# Patient Record
Sex: FEMALE | Race: WHITE | NOT HISPANIC OR LATINO | Employment: OTHER | URBAN - METROPOLITAN AREA
[De-identification: names, ages, dates, MRNs, and addresses within clinical notes are randomized per-mention and may not be internally consistent; named-entity substitution may affect disease eponyms.]

---

## 2017-07-20 ENCOUNTER — OFFICE VISIT - HEALTHEAST (OUTPATIENT)
Dept: FAMILY MEDICINE | Facility: CLINIC | Age: 67
End: 2017-07-20

## 2017-07-20 DIAGNOSIS — R39.15 URGENCY OF URINATION: ICD-10-CM

## 2017-07-20 DIAGNOSIS — R30.0 DYSURIA: ICD-10-CM

## 2017-09-13 ENCOUNTER — COMMUNICATION - HEALTHEAST (OUTPATIENT)
Dept: FAMILY MEDICINE | Facility: CLINIC | Age: 67
End: 2017-09-13

## 2017-09-13 DIAGNOSIS — K21.9 GERD (GASTROESOPHAGEAL REFLUX DISEASE): ICD-10-CM

## 2017-10-10 ENCOUNTER — OFFICE VISIT - HEALTHEAST (OUTPATIENT)
Dept: FAMILY MEDICINE | Facility: CLINIC | Age: 67
End: 2017-10-10

## 2017-10-10 DIAGNOSIS — F41.9 ANXIETY: ICD-10-CM

## 2017-10-10 DIAGNOSIS — L65.9 HAIR LOSS: ICD-10-CM

## 2017-10-10 DIAGNOSIS — K21.9 GERD (GASTROESOPHAGEAL REFLUX DISEASE): ICD-10-CM

## 2017-10-10 DIAGNOSIS — L29.9 ITCHING: ICD-10-CM

## 2017-10-10 DIAGNOSIS — E78.5 HYPERLIPIDEMIA: ICD-10-CM

## 2017-10-10 ASSESSMENT — MIFFLIN-ST. JEOR: SCORE: 1268.39

## 2017-10-11 ENCOUNTER — AMBULATORY - HEALTHEAST (OUTPATIENT)
Dept: LAB | Facility: CLINIC | Age: 67
End: 2017-10-11

## 2017-10-11 ENCOUNTER — COMMUNICATION - HEALTHEAST (OUTPATIENT)
Dept: FAMILY MEDICINE | Facility: CLINIC | Age: 67
End: 2017-10-11

## 2017-10-11 DIAGNOSIS — L65.9 HAIR LOSS: ICD-10-CM

## 2017-10-11 DIAGNOSIS — K21.9 GERD (GASTROESOPHAGEAL REFLUX DISEASE): ICD-10-CM

## 2017-10-11 DIAGNOSIS — E78.5 HYPERLIPIDEMIA: ICD-10-CM

## 2017-10-11 LAB
CHOLEST SERPL-MCNC: 179 MG/DL
FASTING STATUS PATIENT QL REPORTED: YES
HDLC SERPL-MCNC: 56 MG/DL
LDLC SERPL CALC-MCNC: 87 MG/DL
TRIGL SERPL-MCNC: 180 MG/DL

## 2017-10-24 ENCOUNTER — HOSPITAL ENCOUNTER (OUTPATIENT)
Dept: MAMMOGRAPHY | Facility: HOSPITAL | Age: 67
Discharge: HOME | End: 2017-10-24
Attending: FAMILY MEDICINE

## 2017-10-24 DIAGNOSIS — Z12.31 VISIT FOR SCREENING MAMMOGRAM: ICD-10-CM

## 2019-01-21 ENCOUNTER — OFFICE VISIT - HEALTHEAST (OUTPATIENT)
Dept: FAMILY MEDICINE | Facility: CLINIC | Age: 69
End: 2019-01-21

## 2019-01-21 ENCOUNTER — RECORDS - HEALTHEAST (OUTPATIENT)
Dept: GENERAL RADIOLOGY | Facility: CLINIC | Age: 69
End: 2019-01-21

## 2019-01-21 DIAGNOSIS — K21.9 GERD (GASTROESOPHAGEAL REFLUX DISEASE): ICD-10-CM

## 2019-01-21 DIAGNOSIS — E55.9 VITAMIN D DEFICIENCY: ICD-10-CM

## 2019-01-21 DIAGNOSIS — M89.9 DISORDER OF BONE AND CARTILAGE: ICD-10-CM

## 2019-01-21 DIAGNOSIS — F17.210 CIGARETTE NICOTINE DEPENDENCE WITHOUT COMPLICATION: ICD-10-CM

## 2019-01-21 DIAGNOSIS — F41.1 ANXIETY STATE: ICD-10-CM

## 2019-01-21 DIAGNOSIS — R93.89 ABNORMAL FINDINGS ON DIAGNOSTIC IMAGING OF OTHER SPECIFIED BODY STRUCTURES: ICD-10-CM

## 2019-01-21 DIAGNOSIS — M94.9 DISORDER OF BONE AND CARTILAGE: ICD-10-CM

## 2019-01-21 DIAGNOSIS — M25.562 PAIN IN LEFT KNEE: ICD-10-CM

## 2019-01-21 DIAGNOSIS — M25.562 LEFT KNEE PAIN, UNSPECIFIED CHRONICITY: ICD-10-CM

## 2019-01-21 DIAGNOSIS — K21.9 GASTROESOPHAGEAL REFLUX DISEASE WITHOUT ESOPHAGITIS: ICD-10-CM

## 2019-01-21 DIAGNOSIS — F41.9 ANXIETY: ICD-10-CM

## 2019-01-21 DIAGNOSIS — E78.5 HYPERLIPIDEMIA, UNSPECIFIED HYPERLIPIDEMIA TYPE: ICD-10-CM

## 2019-01-21 LAB
ALBUMIN SERPL-MCNC: 3.8 G/DL (ref 3.5–5)
ALP SERPL-CCNC: 85 U/L (ref 45–120)
ALT SERPL W P-5'-P-CCNC: 23 U/L (ref 0–45)
ANION GAP SERPL CALCULATED.3IONS-SCNC: 9 MMOL/L (ref 5–18)
AST SERPL W P-5'-P-CCNC: 17 U/L (ref 0–40)
BILIRUB DIRECT SERPL-MCNC: 0.2 MG/DL
BILIRUB SERPL-MCNC: 0.6 MG/DL (ref 0–1)
BUN SERPL-MCNC: 18 MG/DL (ref 8–28)
CALCIUM SERPL-MCNC: 9.6 MG/DL (ref 8.5–10.5)
CHLORIDE BLD-SCNC: 108 MMOL/L (ref 98–107)
CHOLEST SERPL-MCNC: 188 MG/DL
CO2 SERPL-SCNC: 25 MMOL/L (ref 22–31)
CREAT SERPL-MCNC: 0.7 MG/DL (ref 0.6–1.1)
FASTING STATUS PATIENT QL REPORTED: YES
GFR SERPL CREATININE-BSD FRML MDRD: >60 ML/MIN/1.73M2
GLUCOSE BLD-MCNC: 94 MG/DL (ref 70–125)
HDLC SERPL-MCNC: 78 MG/DL
LDLC SERPL CALC-MCNC: 90 MG/DL
POTASSIUM BLD-SCNC: 4.7 MMOL/L (ref 3.5–5)
PROT SERPL-MCNC: 7 G/DL (ref 6–8)
SODIUM SERPL-SCNC: 142 MMOL/L (ref 136–145)
TRIGL SERPL-MCNC: 98 MG/DL
TSH SERPL DL<=0.005 MIU/L-ACNC: 1.28 UIU/ML (ref 0.3–5)

## 2019-01-22 ENCOUNTER — COMMUNICATION - HEALTHEAST (OUTPATIENT)
Dept: FAMILY MEDICINE | Facility: CLINIC | Age: 69
End: 2019-01-22

## 2019-01-22 LAB — 25(OH)D3 SERPL-MCNC: 13.6 NG/ML (ref 30–80)

## 2019-02-08 ENCOUNTER — COMMUNICATION - HEALTHEAST (OUTPATIENT)
Dept: FAMILY MEDICINE | Facility: CLINIC | Age: 69
End: 2019-02-08

## 2019-06-03 ENCOUNTER — COMMUNICATION - HEALTHEAST (OUTPATIENT)
Dept: FAMILY MEDICINE | Facility: CLINIC | Age: 69
End: 2019-06-03

## 2019-06-14 ENCOUNTER — OFFICE VISIT - HEALTHEAST (OUTPATIENT)
Dept: FAMILY MEDICINE | Facility: CLINIC | Age: 69
End: 2019-06-14

## 2019-06-14 DIAGNOSIS — J01.00 ACUTE NON-RECURRENT MAXILLARY SINUSITIS: ICD-10-CM

## 2019-09-04 ENCOUNTER — COMMUNICATION - HEALTHEAST (OUTPATIENT)
Dept: TELEHEALTH | Facility: CLINIC | Age: 69
End: 2019-09-04

## 2019-09-04 ENCOUNTER — OFFICE VISIT - HEALTHEAST (OUTPATIENT)
Dept: FAMILY MEDICINE | Facility: CLINIC | Age: 69
End: 2019-09-04

## 2019-09-04 DIAGNOSIS — H01.119 EYELID DERMATITIS, ALLERGIC/CONTACT: ICD-10-CM

## 2019-09-09 ENCOUNTER — COMMUNICATION - HEALTHEAST (OUTPATIENT)
Dept: SCHEDULING | Facility: CLINIC | Age: 69
End: 2019-09-09

## 2019-09-13 ENCOUNTER — HOSPITAL ENCOUNTER (OUTPATIENT)
Dept: MAMMOGRAPHY | Facility: CLINIC | Age: 69
Discharge: HOME | End: 2019-09-13
Attending: FAMILY MEDICINE

## 2019-09-13 DIAGNOSIS — Z12.31 VISIT FOR SCREENING MAMMOGRAM: ICD-10-CM

## 2020-02-05 ENCOUNTER — RECORDS - HEALTHEAST (OUTPATIENT)
Dept: ADMINISTRATIVE | Facility: OTHER | Age: 70
End: 2020-02-05

## 2020-03-01 ENCOUNTER — COMMUNICATION - HEALTHEAST (OUTPATIENT)
Dept: FAMILY MEDICINE | Facility: CLINIC | Age: 70
End: 2020-03-01

## 2020-03-01 DIAGNOSIS — E78.5 HYPERLIPIDEMIA, UNSPECIFIED HYPERLIPIDEMIA TYPE: ICD-10-CM

## 2020-03-01 DIAGNOSIS — K21.9 GERD (GASTROESOPHAGEAL REFLUX DISEASE): ICD-10-CM

## 2020-07-23 ENCOUNTER — COMMUNICATION - HEALTHEAST (OUTPATIENT)
Dept: FAMILY MEDICINE | Facility: CLINIC | Age: 70
End: 2020-07-23

## 2020-07-23 ENCOUNTER — OFFICE VISIT - HEALTHEAST (OUTPATIENT)
Dept: FAMILY MEDICINE | Facility: CLINIC | Age: 70
End: 2020-07-23

## 2020-07-23 ENCOUNTER — COMMUNICATION - HEALTHEAST (OUTPATIENT)
Dept: SCHEDULING | Facility: CLINIC | Age: 70
End: 2020-07-23

## 2020-07-23 DIAGNOSIS — H81.10 BENIGN PAROXYSMAL POSITIONAL VERTIGO, UNSPECIFIED LATERALITY: ICD-10-CM

## 2020-07-23 DIAGNOSIS — F41.9 ANXIETY: ICD-10-CM

## 2020-07-23 DIAGNOSIS — L98.9 SKIN LESION: ICD-10-CM

## 2020-07-23 DIAGNOSIS — H57.89 EYE DISCHARGE: ICD-10-CM

## 2020-07-23 DIAGNOSIS — H65.91 OME (OTITIS MEDIA WITH EFFUSION), RIGHT: ICD-10-CM

## 2020-07-25 ENCOUNTER — COMMUNICATION - HEALTHEAST (OUTPATIENT)
Dept: FAMILY MEDICINE | Facility: CLINIC | Age: 70
End: 2020-07-25

## 2020-08-03 ENCOUNTER — OFFICE VISIT - HEALTHEAST (OUTPATIENT)
Dept: OCCUPATIONAL THERAPY | Facility: REHABILITATION | Age: 70
End: 2020-08-03

## 2020-08-03 DIAGNOSIS — R26.81 UNSTEADINESS ON FEET: ICD-10-CM

## 2020-08-03 DIAGNOSIS — Z78.9 DECREASED ACTIVITIES OF DAILY LIVING (ADL): ICD-10-CM

## 2020-08-03 DIAGNOSIS — R42 DIZZINESS: ICD-10-CM

## 2020-08-03 DIAGNOSIS — H81.11 BENIGN PAROXYSMAL POSITIONAL VERTIGO, RIGHT: ICD-10-CM

## 2020-08-05 ENCOUNTER — COMMUNICATION - HEALTHEAST (OUTPATIENT)
Dept: SCHEDULING | Facility: CLINIC | Age: 70
End: 2020-08-05

## 2020-08-05 DIAGNOSIS — R42 DIZZINESS: ICD-10-CM

## 2020-08-10 ENCOUNTER — OFFICE VISIT - HEALTHEAST (OUTPATIENT)
Dept: OCCUPATIONAL THERAPY | Facility: REHABILITATION | Age: 70
End: 2020-08-10

## 2020-08-10 DIAGNOSIS — R42 DIZZINESS: ICD-10-CM

## 2020-08-10 DIAGNOSIS — R26.81 UNSTEADINESS ON FEET: ICD-10-CM

## 2020-08-10 DIAGNOSIS — H81.11 BENIGN PAROXYSMAL POSITIONAL VERTIGO, RIGHT: ICD-10-CM

## 2020-08-10 DIAGNOSIS — Z78.9 DECREASED ACTIVITIES OF DAILY LIVING (ADL): ICD-10-CM

## 2020-08-17 ENCOUNTER — OFFICE VISIT - HEALTHEAST (OUTPATIENT)
Dept: OCCUPATIONAL THERAPY | Facility: REHABILITATION | Age: 70
End: 2020-08-17

## 2020-08-17 DIAGNOSIS — H81.11 BENIGN PAROXYSMAL POSITIONAL VERTIGO, RIGHT: ICD-10-CM

## 2020-08-17 DIAGNOSIS — R42 DIZZINESS: ICD-10-CM

## 2020-08-17 DIAGNOSIS — Z78.9 DECREASED ACTIVITIES OF DAILY LIVING (ADL): ICD-10-CM

## 2020-08-17 DIAGNOSIS — R26.81 UNSTEADINESS ON FEET: ICD-10-CM

## 2020-08-21 ENCOUNTER — RECORDS - HEALTHEAST (OUTPATIENT)
Dept: FAMILY MEDICINE | Facility: CLINIC | Age: 70
End: 2020-08-21

## 2020-08-21 ENCOUNTER — OFFICE VISIT - HEALTHEAST (OUTPATIENT)
Dept: FAMILY MEDICINE | Facility: CLINIC | Age: 70
End: 2020-08-21

## 2020-08-21 ENCOUNTER — AMBULATORY - HEALTHEAST (OUTPATIENT)
Dept: FAMILY MEDICINE | Facility: CLINIC | Age: 70
End: 2020-08-21

## 2020-08-21 ENCOUNTER — COMMUNICATION - HEALTHEAST (OUTPATIENT)
Dept: FAMILY MEDICINE | Facility: CLINIC | Age: 70
End: 2020-08-21

## 2020-08-21 DIAGNOSIS — H66.90 OTITIS MEDIA, UNSPECIFIED LATERALITY, UNSPECIFIED OTITIS MEDIA TYPE: ICD-10-CM

## 2020-08-21 DIAGNOSIS — R42 VERTIGO: ICD-10-CM

## 2020-08-21 DIAGNOSIS — J01.90 ACUTE SINUSITIS TREATED WITH ANTIBIOTICS IN THE PAST 60 DAYS: ICD-10-CM

## 2020-08-26 ENCOUNTER — OFFICE VISIT - HEALTHEAST (OUTPATIENT)
Dept: OCCUPATIONAL THERAPY | Facility: REHABILITATION | Age: 70
End: 2020-08-26

## 2020-08-26 DIAGNOSIS — H81.11 BENIGN PAROXYSMAL POSITIONAL VERTIGO, RIGHT: ICD-10-CM

## 2020-08-26 DIAGNOSIS — R42 DIZZINESS: ICD-10-CM

## 2020-08-26 DIAGNOSIS — Z78.9 DECREASED ACTIVITIES OF DAILY LIVING (ADL): ICD-10-CM

## 2020-08-26 DIAGNOSIS — R26.81 UNSTEADINESS ON FEET: ICD-10-CM

## 2020-08-27 ENCOUNTER — OFFICE VISIT - HEALTHEAST (OUTPATIENT)
Dept: FAMILY MEDICINE | Facility: CLINIC | Age: 70
End: 2020-08-27

## 2020-08-27 ENCOUNTER — RECORDS - HEALTHEAST (OUTPATIENT)
Dept: GENERAL RADIOLOGY | Facility: CLINIC | Age: 70
End: 2020-08-27

## 2020-08-27 DIAGNOSIS — H81.10 BENIGN PAROXYSMAL POSITIONAL VERTIGO, UNSPECIFIED LATERALITY: ICD-10-CM

## 2020-08-27 DIAGNOSIS — F40.240 CLAUSTROPHOBIA: ICD-10-CM

## 2020-08-27 DIAGNOSIS — R22.9 LOCALIZED SUPERFICIAL SWELLING, MASS, OR LUMP: ICD-10-CM

## 2020-08-27 DIAGNOSIS — R22.9 LOCALIZED SWELLING, MASS AND LUMP, UNSPECIFIED: ICD-10-CM

## 2020-08-27 DIAGNOSIS — M25.561 ACUTE PAIN OF RIGHT KNEE: ICD-10-CM

## 2020-08-28 ENCOUNTER — COMMUNICATION - HEALTHEAST (OUTPATIENT)
Dept: FAMILY MEDICINE | Facility: CLINIC | Age: 70
End: 2020-08-28

## 2020-09-18 ENCOUNTER — TELEPHONE (OUTPATIENT)
Dept: NEUROLOGY | Facility: CLINIC | Age: 70
End: 2020-09-18

## 2020-09-18 ENCOUNTER — OFFICE VISIT (OUTPATIENT)
Dept: NEUROLOGY | Facility: CLINIC | Age: 70
End: 2020-09-18
Payer: COMMERCIAL

## 2020-09-18 ENCOUNTER — RECORDS - HEALTHEAST (OUTPATIENT)
Dept: ADMINISTRATIVE | Facility: OTHER | Age: 70
End: 2020-09-18

## 2020-09-18 ENCOUNTER — AMBULATORY - HEALTHEAST (OUTPATIENT)
Dept: ADMINISTRATIVE | Facility: REHABILITATION | Age: 70
End: 2020-09-18

## 2020-09-18 VITALS
WEIGHT: 162 LBS | HEART RATE: 79 BPM | HEIGHT: 66 IN | DIASTOLIC BLOOD PRESSURE: 89 MMHG | SYSTOLIC BLOOD PRESSURE: 138 MMHG | BODY MASS INDEX: 26.03 KG/M2

## 2020-09-18 DIAGNOSIS — G43.809 OTHER MIGRAINE WITHOUT STATUS MIGRAINOSUS, NOT INTRACTABLE: ICD-10-CM

## 2020-09-18 DIAGNOSIS — G62.9 NEUROPATHY: ICD-10-CM

## 2020-09-18 DIAGNOSIS — H81.10 BENIGN PAROXYSMAL POSITIONAL VERTIGO, UNSPECIFIED LATERALITY: ICD-10-CM

## 2020-09-18 DIAGNOSIS — J32.9 SINUSITIS, UNSPECIFIED CHRONICITY, UNSPECIFIED LOCATION: ICD-10-CM

## 2020-09-18 DIAGNOSIS — H81.10 BENIGN PAROXYSMAL POSITIONAL VERTIGO, UNSPECIFIED LATERALITY: Primary | ICD-10-CM

## 2020-09-18 PROCEDURE — 99205 OFFICE O/P NEW HI 60 MIN: CPT | Performed by: PSYCHIATRY & NEUROLOGY

## 2020-09-18 RX ORDER — LANSOPRAZOLE 30 MG/1
1 CAPSULE, DELAYED RELEASE ORAL DAILY
COMMUNITY
Start: 2020-03-02 | End: 2022-07-01

## 2020-09-18 RX ORDER — RIZATRIPTAN BENZOATE 10 MG/1
5 TABLET ORAL
Qty: 18 TABLET | Refills: 1 | Status: SHIPPED | OUTPATIENT
Start: 2020-09-18 | End: 2020-11-02

## 2020-09-18 RX ORDER — LORAZEPAM 1 MG/1
1 TABLET ORAL PRN
COMMUNITY
Start: 2020-07-23 | End: 2022-04-14

## 2020-09-18 RX ORDER — ATORVASTATIN CALCIUM 10 MG/1
1 TABLET, FILM COATED ORAL DAILY
COMMUNITY
Start: 2020-03-02 | End: 2022-07-04

## 2020-09-18 ASSESSMENT — MIFFLIN-ST. JEOR: SCORE: 1261.58

## 2020-09-18 NOTE — LETTER
9/18/2020         RE: Max Thompson  [address] [facility] [id number]        Dear Colleague,    Thank you for referring your patient, Max Thompson, to the [facility]UR[facility] [place]. Please see a copy of my visit note below.    NEUROLOGY OUTPATIENT CONSULT NOTE  Sep 18, 2020     CHIEF COMPLAINT/REASON FOR VISIT/REASON FOR CONSULT  Patient presents with:  Dizziness    REASON FOR CONSULTATION- Dizziness    REFERRAL SOURCE  Dr. Zaina Lewis  CC Dr. Zaina Lewis    HISTORY OF PRESENT ILLNESS  Max Thompson is a 70 year old female seen for multiple symptoms.  Patient symptoms are little bit weak.  Her symptoms initially started in July when she was diagnosed with a sinus infection and dry eye-like symptoms/itchy eyes.  She also developed bumps under her eye.  She was also having some vertigo at that point.  She was initially treated with antibiotics thinking that she had sinusitis or upper respiratory infection.  This did not improve and she did try multiple rounds of antibiotics.  Subsequent to that she has developed vertigo.  Physical therapy was tried without any benefit.  The vertigo is brought on by moving the head too fast.  Would only last for 30 seconds.  Dizziness is present when she is sitting.  Currently she is not dizzy.  She does have hearing loss and muffled hearing.  This is new for her.    In addition to this patient also has unsteadiness.  The unsteadiness is there all the time.  Mainly happens when she tries to stand up and walk.  She reports no numbness in her feet.  Patient also noticed swelling on the right knee.  She has an MRI of her knee scheduled.  Reports the symptoms are present since July.    She also has headaches this can be frontal or occipital in location.  They can be unilateral or bilateral.  They are generally throbbing in nature.  There is significant photophobia and phonophobia.  She occasionally still has nausea.  She has not really tried any  "prescription medications for this.  Only takes over-the-counter medications which is not very beneficial.    Previous history is reviewed and this is unchanged.    PAST MEDICAL/SURGICAL HISTORY  History reviewed. No pertinent past medical history.  There is no problem list on file for this patient.  Significant for high cholesterol    FAMILY HISTORY  Family History   Problem Relation Age of Onset     Cancer Son    Family history negative for neuropathy    SOCIAL HISTORY  Social History     Tobacco Use     Smoking status: Former Smoker     Smokeless tobacco: Former User   Substance Use Topics     Alcohol use: Yes     Comment: Occasionally     Drug use: Never       SYSTEMS REVIEW  Twelve-system ROS was done and other than the HPI this was negative except for neck pain, weakness paralysis, balance coordination problems, difficulty walking, dizziness, headaches, anxiety, skin changes/rash under her eyes    MEDICATIONS  atorvastatin (LIPITOR) 10 MG tablet, Take 1 tablet by mouth daily  LANsoprazole (PREVACID) 30 MG DR capsule, Take 1 capsule by mouth daily  LORazepam (ATIVAN) 1 MG tablet, Take 1 mg by mouth as needed    No current facility-administered medications on file prior to visit.        PHYSICAL EXAMINATION  VITALS: /89   Pulse 79   Ht 1.676 m (5' 6\")   Wt 73.5 kg (162 lb)   BMI 26.15 kg/m    GENERAL: Healthy appearing, alert, no acute distress, normal habitus.  CARDIOVASCULAR: Extremities warm and well perfused. Pulses present.   EYES: Funduscopic exam does not reveal any papilledema.   NEUROLOGICAL:  Patient is awake and oriented to self, place and time.  Attention span is normal.  Memory is grossly intact.  Language is fluent and follows commands appropriately.  Appropriate fund of knowledge. Cranial nerves 2-12 are intact. There is no pronator drift.  Motor exam shows 5/5 strength in all extremities.  Tone is symmetric bilaterally in upper and lower extremities.  Reflexes are symmetric and absent in " upper extremities and lower extremities. Sensory exam is grossly intact to light touch, pin prick and vibration except decreased vibration in the big toes bilaterally..  Finger to nose and heel to shin is without dysmetria.  Romberg is negative extremely unsteady.  Gait is normal and the patient is able to do tandem walk and walk on toes and heels without of difficulty.    DIAGNOSTICS    OUTSIDE RECORDS  Outside referral notes were reviewed and pertinent information has been summarized:- Patient was seen in primary care clinic in August 27, 2020.  She was thought to have benign paroxysmal positional vertigo.  Was referred to neurology.  An MRI of the knee to evaluate her right knee pain.  X-ray of the knee done did not show any significant structural lesions.  Patient has already had vestibular rehab through occupational therapy.  These notes was also reviewed.  Patient did not make progress in terms of her dizziness/unsteadiness with therapy.    IMPRESSION/REPORT/PLAN  Benign paroxysmal positional vertigo, unspecified laterality  Sinusitis, unspecified chronicity, unspecified location  Neuropathy  Other migraine without status migrainosus, not intractable    This is a 70 year old female with multiple problems.  Her URI symptoms, itchy eyes and fluid in the ear is suggestive of sinusitis but I would refer to ENT.  I do not think the symptoms are neurological in nature.  Her vertigo could be related to her ENT problems.  Would encourage her to continue physical therapy for the vertigo to see if that resolves it.  In terms of her unsteadiness I do suspect she has neuropathy based on examination.  I will check an EMG as well as do blood work to look for causes of neuropathy.  In terms of her headaches these are suggestive of migraine headaches but I will prescribe her Maxalt.  We will hold off on preventive medication for right now though could try it down the road.  I will see her back after the testing.    -      EMG  -     Vitamin B12  -     Vitamin B1 whole blood  -     TSH with free T4 reflex  -     Protein electrophoresis  -     Methylmalonic Acid  -     Lyme Disease Dina with reflex to WB Serum  -     Glucose  -     Erythrocyte sedimentation rate auto  -     Copper level  -     Ceruloplasmin  -     CK total  -     Anti Nuclear Dina IgG by IFA with Reflex  -     PHYSICAL THERAPY REFERRAL; Future        -     OTOLARYNGOLOGY REFERRAL  -     rizatriptan (MAXALT) 10 MG tablet; Take 0.5 tablets (5 mg) by mouth at onset of headache for migraine May repeat in 2 hours.    Over 60 minutes were spent coordinating the care for the patient today.  More than 50% of the time was spent counseling, educating the patient.  Billing: Data 4 points, Problem 4 points.    Pierre Herrera MD  Neurologist  [facility]  Tel:- [phone]    This note was dictated using voice recognition software.  Any grammatical or context distortions are unintentional and inherent to the software.      Again, thank you for allowing me to participate in the care of your patient.        Sincerely,        Pierre Herrera MD

## 2020-09-18 NOTE — NURSING NOTE
Chief Complaint   Patient presents with     Dizziness     Anastasia Eugene on 9/18/2020 at 11:55 AM

## 2020-09-18 NOTE — PROGRESS NOTES
NEUROLOGY OUTPATIENT CONSULT NOTE  Sep 18, 2020     CHIEF COMPLAINT/REASON FOR VISIT/REASON FOR CONSULT  Patient presents with:  Dizziness    REASON FOR CONSULTATION- Dizziness    REFERRAL SOURCE  Dr. Zaina Lewis  CC Dr. Zaina Lewis    HISTORY OF PRESENT ILLNESS  Max Thompson is a 70 year old female seen for multiple symptoms.  Patient symptoms are little bit weak.  Her symptoms initially started in July when she was diagnosed with a sinus infection and dry eye-like symptoms/itchy eyes.  She also developed bumps under her eye.  She was also having some vertigo at that point.  She was initially treated with antibiotics thinking that she had sinusitis or upper respiratory infection.  This did not improve and she did try multiple rounds of antibiotics.  Subsequent to that she has developed vertigo.  Physical therapy was tried without any benefit.  The vertigo is brought on by moving the head too fast.  Would only last for 30 seconds.  Dizziness is present when she is sitting.  Currently she is not dizzy.  She does have hearing loss and muffled hearing.  This is new for her.    In addition to this patient also has unsteadiness.  The unsteadiness is there all the time.  Mainly happens when she tries to stand up and walk.  She reports no numbness in her feet.  Patient also noticed swelling on the right knee.  She has an MRI of her knee scheduled.  Reports the symptoms are present since July.    She also has headaches this can be frontal or occipital in location.  They can be unilateral or bilateral.  They are generally throbbing in nature.  There is significant photophobia and phonophobia.  She occasionally still has nausea.  She has not really tried any prescription medications for this.  Only takes over-the-counter medications which is not very beneficial.    Previous history is reviewed and this is unchanged.    PAST MEDICAL/SURGICAL HISTORY  History reviewed. No pertinent past medical history.  There  "is no problem list on file for this patient.  Significant for high cholesterol    FAMILY HISTORY  Family History   Problem Relation Age of Onset     Cancer Son    Family history negative for neuropathy    SOCIAL HISTORY  Social History     Tobacco Use     Smoking status: Former Smoker     Smokeless tobacco: Former User   Substance Use Topics     Alcohol use: Yes     Comment: Occasionally     Drug use: Never       SYSTEMS REVIEW  Twelve-system ROS was done and other than the HPI this was negative except for neck pain, weakness paralysis, balance coordination problems, difficulty walking, dizziness, headaches, anxiety, skin changes/rash under her eyes    MEDICATIONS  atorvastatin (LIPITOR) 10 MG tablet, Take 1 tablet by mouth daily  LANsoprazole (PREVACID) 30 MG DR capsule, Take 1 capsule by mouth daily  LORazepam (ATIVAN) 1 MG tablet, Take 1 mg by mouth as needed    No current facility-administered medications on file prior to visit.        PHYSICAL EXAMINATION  VITALS: /89   Pulse 79   Ht 1.676 m (5' 6\")   Wt 73.5 kg (162 lb)   BMI 26.15 kg/m    GENERAL: Healthy appearing, alert, no acute distress, normal habitus.  CARDIOVASCULAR: Extremities warm and well perfused. Pulses present.   EYES: Funduscopic exam does not reveal any papilledema.   NEUROLOGICAL:  Patient is awake and oriented to self, place and time.  Attention span is normal.  Memory is grossly intact.  Language is fluent and follows commands appropriately.  Appropriate fund of knowledge. Cranial nerves 2-12 are intact. There is no pronator drift.  Motor exam shows 5/5 strength in all extremities.  Tone is symmetric bilaterally in upper and lower extremities.  Reflexes are symmetric and absent in upper extremities and lower extremities. Sensory exam is grossly intact to light touch, pin prick and vibration except decreased vibration in the big toes bilaterally..  Finger to nose and heel to shin is without dysmetria.  Romberg is negative extremely " unsteady.  Gait is normal and the patient is able to do tandem walk and walk on toes and heels without of difficulty.    DIAGNOSTICS    OUTSIDE RECORDS  Outside referral notes were reviewed and pertinent information has been summarized:- Patient was seen in primary care clinic in August 27, 2020.  She was thought to have benign paroxysmal positional vertigo.  Was referred to neurology.  An MRI of the knee to evaluate her right knee pain.  X-ray of the knee done did not show any significant structural lesions.  Patient has already had vestibular rehab through occupational therapy.  These notes was also reviewed.  Patient did not make progress in terms of her dizziness/unsteadiness with therapy.    IMPRESSION/REPORT/PLAN  Benign paroxysmal positional vertigo, unspecified laterality  Sinusitis, unspecified chronicity, unspecified location  Neuropathy  Other migraine without status migrainosus, not intractable    This is a 70 year old female with multiple problems.  Her URI symptoms, itchy eyes and fluid in the ear is suggestive of sinusitis but I would refer to ENT.  I do not think the symptoms are neurological in nature.  Her vertigo could be related to her ENT problems.  Would encourage her to continue physical therapy for the vertigo to see if that resolves it.  In terms of her unsteadiness I do suspect she has neuropathy based on examination.  I will check an EMG as well as do blood work to look for causes of neuropathy.  In terms of her headaches these are suggestive of migraine headaches but I will prescribe her Maxalt.  We will hold off on preventive medication for right now though could try it down the road.  I will see her back after the testing.    -     EMG  -     Vitamin B12  -     Vitamin B1 whole blood  -     TSH with free T4 reflex  -     Protein electrophoresis  -     Methylmalonic Acid  -     Lyme Disease Dina with reflex to WB Serum  -     Glucose  -     Erythrocyte sedimentation rate auto  -      Copper level  -     Ceruloplasmin  -     CK total  -     Anti Nuclear Dina IgG by IFA with Reflex  -     PHYSICAL THERAPY REFERRAL; Future        -     OTOLARYNGOLOGY REFERRAL  -     rizatriptan (MAXALT) 10 MG tablet; Take 0.5 tablets (5 mg) by mouth at onset of headache for migraine May repeat in 2 hours.    Over 60 minutes were spent coordinating the care for the patient today.  More than 50% of the time was spent counseling, educating the patient.  Billing: Data 4 points, Problem 4 points.    Pierre Herrera MD  Neurologist  [facility]  Tel:- [phone]    This note was dictated using voice recognition software.  Any grammatical or context distortions are unintentional and inherent to the software.

## 2020-09-18 NOTE — TELEPHONE ENCOUNTER
She was in earlier today. PT/OT was ordered for her and they called her from a [place] office today and she would really like to go to [facility] in [place] here. She has uses [facility] in the past for her OT person. Please fax orders to them and also call patient once done so she is aware?Thank you!

## 2020-09-21 ENCOUNTER — AMBULATORY - HEALTHEAST (OUTPATIENT)
Dept: ADMINISTRATIVE | Facility: CLINIC | Age: 70
End: 2020-09-21

## 2020-09-21 DIAGNOSIS — H81.10 BENIGN PAROXYSMAL POSITIONAL VERTIGO, UNSPECIFIED LATERALITY: ICD-10-CM

## 2020-09-22 NOTE — TELEPHONE ENCOUNTER
Janette from  Rehab left msg stating that they received a PT referral with [facility] but that [facility] is an OT. They're asking to change PT order to OT.

## 2020-09-23 ENCOUNTER — RECORDS - HEALTHEAST (OUTPATIENT)
Dept: LAB | Facility: HOSPITAL | Age: 70
End: 2020-09-23

## 2020-09-23 LAB
CK SERPL-CCNC: 42 U/L (ref 30–190)
ERYTHROCYTE [SEDIMENTATION RATE] IN BLOOD BY WESTERGREN METHOD: 9 MM/HR (ref 0–20)
FASTING STATUS PATIENT QL REPORTED: NO
FERRITIN SERPL-MCNC: 155 NG/ML (ref 10–130)
GLUCOSE BLD-MCNC: 106 MG/DL (ref 70–125)
TSH SERPL DL<=0.005 MIU/L-ACNC: 1.89 UIU/ML (ref 0.3–5)
VIT B12 SERPL-MCNC: 404 PG/ML (ref 213–816)

## 2020-09-24 LAB
ANA SER QL: 0.2 U
B BURGDOR IGG+IGM SER QL: 0.04 INDEX VALUE
CERULOPLASMIN SERPL-MCNC: 32 MG/DL (ref 20–60)

## 2020-09-25 LAB
ALBUMIN PERCENT: 62.4 % (ref 51–67)
ALBUMIN SERPL ELPH-MCNC: 4.2 G/DL (ref 3.2–4.7)
ALPHA 1 PERCENT: 2.5 % (ref 2–4)
ALPHA 2 PERCENT: 10.6 % (ref 5–13)
ALPHA1 GLOB SERPL ELPH-MCNC: 0.2 G/DL (ref 0.1–0.3)
ALPHA2 GLOB SERPL ELPH-MCNC: 0.7 G/DL (ref 0.4–0.9)
B-GLOBULIN SERPL ELPH-MCNC: 0.8 G/DL (ref 0.7–1.2)
BETA PERCENT: 11.2 % (ref 10–17)
GAMMA GLOB SERPL ELPH-MCNC: 0.9 G/DL (ref 0.6–1.4)
GAMMA GLOBULIN PERCENT: 13.3 % (ref 9–20)
METHYLMALONATE SERPL-SCNC: 0.16 UMOL/L (ref 0–0.4)
PATH ICD:: NORMAL
PROT PATTERN SERPL ELPH-IMP: NORMAL
PROT SERPL-MCNC: 6.8 G/DL (ref 6–8)
REVIEWING PATHOLOGIST: NORMAL

## 2020-09-26 LAB
COPPER SERPL-MCNC: 118.2 UG/DL (ref 80–155)
VIT B1 PYROPHOSHATE BLD-SCNC: 136 NMOL/L (ref 70–180)

## 2020-09-28 ENCOUNTER — AMBULATORY - HEALTHEAST (OUTPATIENT)
Dept: ADMINISTRATIVE | Facility: REHABILITATION | Age: 70
End: 2020-09-28

## 2020-09-28 DIAGNOSIS — H81.10 BENIGN PAROXYSMAL POSITIONAL VERTIGO, UNSPECIFIED LATERALITY: ICD-10-CM

## 2020-09-30 ENCOUNTER — OFFICE VISIT - HEALTHEAST (OUTPATIENT)
Dept: AUDIOLOGY | Facility: CLINIC | Age: 70
End: 2020-09-30

## 2020-09-30 ENCOUNTER — OFFICE VISIT - HEALTHEAST (OUTPATIENT)
Dept: OTOLARYNGOLOGY | Facility: CLINIC | Age: 70
End: 2020-09-30

## 2020-09-30 DIAGNOSIS — H90.3 SENSORINEURAL HEARING LOSS, BILATERAL: ICD-10-CM

## 2020-09-30 DIAGNOSIS — R26.89 IMBALANCE: ICD-10-CM

## 2020-09-30 DIAGNOSIS — H90.3 SENSORINEURAL HEARING LOSS (SNHL) OF BOTH EARS: ICD-10-CM

## 2020-09-30 DIAGNOSIS — G50.1 ATYPICAL FACIAL PAIN: ICD-10-CM

## 2020-09-30 DIAGNOSIS — G62.9 NEUROPATHY: ICD-10-CM

## 2020-09-30 DIAGNOSIS — G43.009 MIGRAINE WITHOUT AURA AND WITHOUT STATUS MIGRAINOSUS, NOT INTRACTABLE: ICD-10-CM

## 2020-10-06 ENCOUNTER — HOSPITAL ENCOUNTER (OUTPATIENT)
Dept: MRI IMAGING | Facility: HOSPITAL | Age: 70
Discharge: HOME | End: 2020-10-06
Attending: FAMILY MEDICINE

## 2020-10-06 ENCOUNTER — AMBULATORY - HEALTHEAST (OUTPATIENT)
Dept: FAMILY MEDICINE | Facility: CLINIC | Age: 70
End: 2020-10-06

## 2020-10-06 DIAGNOSIS — R22.9 LOCALIZED SUPERFICIAL SWELLING, MASS, OR LUMP: ICD-10-CM

## 2020-10-06 DIAGNOSIS — S83.281A TEAR OF LATERAL CARTILAGE OR MENISCUS OF KNEE, CURRENT, RIGHT, INITIAL ENCOUNTER: ICD-10-CM

## 2020-10-06 DIAGNOSIS — M25.561 ACUTE PAIN OF RIGHT KNEE: ICD-10-CM

## 2020-10-07 ENCOUNTER — COMMUNICATION - HEALTHEAST (OUTPATIENT)
Dept: FAMILY MEDICINE | Facility: CLINIC | Age: 70
End: 2020-10-07

## 2020-10-12 ENCOUNTER — RECORDS - HEALTHEAST (OUTPATIENT)
Dept: ADMINISTRATIVE | Facility: OTHER | Age: 70
End: 2020-10-12

## 2020-10-28 ENCOUNTER — OFFICE VISIT - HEALTHEAST (OUTPATIENT)
Dept: OCCUPATIONAL THERAPY | Facility: REHABILITATION | Age: 70
End: 2020-10-28

## 2020-10-28 DIAGNOSIS — Z78.9 DECREASED ACTIVITIES OF DAILY LIVING (ADL): ICD-10-CM

## 2020-10-28 DIAGNOSIS — H81.11 BENIGN PAROXYSMAL POSITIONAL VERTIGO, RIGHT: ICD-10-CM

## 2020-10-28 DIAGNOSIS — R26.81 UNSTEADINESS ON FEET: ICD-10-CM

## 2020-10-28 DIAGNOSIS — R42 DIZZINESS: ICD-10-CM

## 2020-11-02 ENCOUNTER — COMMUNICATION - HEALTHEAST (OUTPATIENT)
Dept: SCHEDULING | Facility: CLINIC | Age: 70
End: 2020-11-02

## 2020-11-02 ENCOUNTER — OFFICE VISIT (OUTPATIENT)
Dept: NEUROLOGY | Facility: CLINIC | Age: 70
End: 2020-11-02
Attending: PSYCHIATRY & NEUROLOGY
Payer: COMMERCIAL

## 2020-11-02 ENCOUNTER — COMMUNICATION - HEALTHEAST (OUTPATIENT)
Dept: FAMILY MEDICINE | Facility: CLINIC | Age: 70
End: 2020-11-02

## 2020-11-02 VITALS
DIASTOLIC BLOOD PRESSURE: 81 MMHG | BODY MASS INDEX: 26.03 KG/M2 | SYSTOLIC BLOOD PRESSURE: 140 MMHG | WEIGHT: 162 LBS | HEART RATE: 82 BPM | HEIGHT: 66 IN

## 2020-11-02 DIAGNOSIS — J32.9 SINUSITIS, UNSPECIFIED CHRONICITY, UNSPECIFIED LOCATION: ICD-10-CM

## 2020-11-02 DIAGNOSIS — G43.809 OTHER MIGRAINE WITHOUT STATUS MIGRAINOSUS, NOT INTRACTABLE: ICD-10-CM

## 2020-11-02 DIAGNOSIS — R26.81 UNSTEADINESS: Primary | ICD-10-CM

## 2020-11-02 DIAGNOSIS — G62.9 NEUROPATHY: Primary | ICD-10-CM

## 2020-11-02 DIAGNOSIS — H81.10 BENIGN PAROXYSMAL POSITIONAL VERTIGO, UNSPECIFIED LATERALITY: ICD-10-CM

## 2020-11-02 PROCEDURE — 95910 NRV CNDJ TEST 7-8 STUDIES: CPT | Performed by: PSYCHIATRY & NEUROLOGY

## 2020-11-02 PROCEDURE — 99215 OFFICE O/P EST HI 40 MIN: CPT | Mod: 25 | Performed by: PSYCHIATRY & NEUROLOGY

## 2020-11-02 PROCEDURE — 95886 MUSC TEST DONE W/N TEST COMP: CPT | Mod: RT | Performed by: PSYCHIATRY & NEUROLOGY

## 2020-11-02 ASSESSMENT — MIFFLIN-ST. JEOR: SCORE: 1261.58

## 2020-11-02 NOTE — PROGRESS NOTES
NEUROLOGY OUTPATIENT CONSULT NOTE  Nov 2, 2020     CHIEF COMPLAINT/REASON FOR VISIT/REASON FOR CONSULT  Patient presents with:  Follow Up  Benign paroxysmal positional vertigo, unspecified laterality    REASON FOR CONSULTATION- Dizziness    REFERRAL SOURCE  Dr. Zaina Lewis  CC Dr. Zaina Lewis    HISTORY OF PRESENT ILLNESS  Max Thompson is a 70 year old female seen for multiple symptoms.  Patient symptoms are little bit weak.  Her symptoms initially started in July when she was diagnosed with a sinus infection and dry eye-like symptoms/itchy eyes.  She also developed bumps under her eye.  She was also having some vertigo at that point.  She was initially treated with antibiotics thinking that she had sinusitis or upper respiratory infection.  This did not improve and she did try multiple rounds of antibiotics.  Subsequent to that she has developed vertigo.  Physical therapy was tried without any benefit.  The vertigo is brought on by moving the head too fast.  Would only last for 30 seconds.  Dizziness is present when she is sitting.  Currently she is not dizzy.  She does have hearing loss and muffled hearing.  This is new for her.    In addition to this patient also has unsteadiness.  The unsteadiness is there all the time.  Mainly happens when she tries to stand up and walk.  She reports no numbness in her feet.  Patient also noticed swelling on the right knee.  She has an MRI of her knee scheduled.  Reports the symptoms are present since July.    She also has headaches this can be frontal or occipital in location.  They can be unilateral or bilateral.  They are generally throbbing in nature.  There is significant photophobia and phonophobia.  She occasionally still has nausea.  She has not really tried any prescription medications for this.  Only takes over-the-counter medications which is not very beneficial.    11/2/20  Patient returns today.  1.  She reports that she continues to stay dizzy.   She has been working with physical therapy which has been helpful.  She was seen by ENT and they did not think she had benign positional proximal vertigo.  They thought maybe the dizziness was related to her migraines.  2.  Patient was complaining of issues with sinusitis.  She was seen by ENT and she did not have sinusitis.  3.  In terms of her headaches she still has them occasionally.  Maxalt did work to abort the headache but she had a lot of side effects felt the medication was too strong.  Encouraged her to split the pill to see if a lower dose would be helpful for  4.  In terms of the unsteadiness she reports improvement.  She is working with physical therapy.  She is mainly standing in a corner trying to close her eyes and see if her balance improves.  Sometimes she tries to stand on one leg.  Reports improvement.  Reports no other new symptoms.  No numbness in her feet at this point.    Previous history is reviewed and this is unchanged.    PAST MEDICAL/SURGICAL HISTORY  History reviewed. No pertinent past medical history.  There is no problem list on file for this patient.  Significant for high cholesterol    FAMILY HISTORY  Family History   Problem Relation Age of Onset     Cancer Son    Family history negative for neuropathy    SOCIAL HISTORY  Social History     Tobacco Use     Smoking status: Former Smoker     Smokeless tobacco: Former User   Substance Use Topics     Alcohol use: Yes     Comment: Occasionally     Drug use: Never       SYSTEMS REVIEW  Twelve-system ROS was done and other than the HPI this was negative for new symptoms.  MEDICATIONS       atorvastatin (LIPITOR) 10 MG tablet, Take 1 tablet by mouth daily       LANsoprazole (PREVACID) 30 MG DR capsule, Take 1 capsule by mouth daily       LORazepam (ATIVAN) 1 MG tablet, Take 1 mg by mouth as needed    No current facility-administered medications on file prior to visit.        PHYSICAL EXAMINATION  VITALS: BP (!) 140/81   Pulse 82   Ht  "1.676 m (5' 6\")   Wt 73.5 kg (162 lb)   BMI 26.15 kg/m    GENERAL: Healthy appearing, alert, no acute distress, normal habitus.  CARDIOVASCULAR: Extremities warm and well perfused. Pulses present.   NEUROLOGICAL:  Patient is awake and oriented to self, place and time.  Attention span is normal.  Memory is grossly intact.  Language is fluent and follows commands appropriately.  Appropriate fund of knowledge. Cranial nerves 2-12 are intact. There is no pronator drift.  Motor exam shows 5/5 strength in all extremities.  Tone is symmetric bilaterally in upper and lower extremities.  Reflexes are symmetric and absent in upper extremities and lower extremities. Sensory exam is grossly intact to light touch, pin prick and vibration.  Finger to nose and heel to shin is without dysmetria.  Romberg is negative today.  Gait is normal and the patient is able to do tandem walk.    DIAGNOSTICS  LABS  Component      Latest Ref Rng & Units 9/23/2020   Albumin %      51.0 - 67.0 % 62.4   Albumin       3.2 - 4.7 g/dL 4.2   Alpha 1 %      2.0 - 4.0 % 2.5   Alpha 1      0.1 - 0.3 g/dL 0.2   Alpha 2 %      5.0 - 13.0 % 10.6   Alpha 2      0.4 - 0.9 g/dL 0.7   % Beta      10.0 - 17.0 % 11.2   Beta      0.7 - 1.2 g/dL 0.8   Gamma Globulin %      9.0 - 20.0 % 13.3   Gamma Globulin      0.6 - 1.4 g/dL 0.9   ELP Comment       Unremarkable protein electrophoresis.   Protein, Total      6.0 - 8.0 g/dL 6.8   Path ICD:       G58.9   Interpreted By:       Kiet Garnett MD   Glucose      70 - 125 mg/dL 106   Patient Fasting > 8hrs?       No   Vitamin B1, Whole Blood      70 - 180 nmol/L 136   MMA Serum/Plasma, Vitamin B12 Status      0.00 - 0.40 umol/L 0.16   Lyme Antibody Cascade      <0.90 Index Value 0.04   TSH      0.30 - 5.00 uIU/mL 1.89   Ferritin      10 - 130 ng/mL 155 (H)   Ceruloplasmin      20 - 60 mg/dL 32   Vitamin B-12      213 - 816 pg/mL 404   Copper, Serum/Plasma      80.0 - 155.0 ug/dL 118.2   Sed Rate      0 - 20 mm/hr 9 "   CK, Total      30 - 190 U/L 42   DENISE Screen [place]      <=2.9 U 0.2       OUTSIDE RECORDS  Outside referral notes were reviewed and pertinent information has been summarized:- Patient was seen in primary care clinic in August 27, 2020.  She was thought to have benign paroxysmal positional vertigo.  Was referred to neurology.  An MRI of the knee to evaluate her right knee pain.  X-ray of the knee done did not show any significant structural lesions.  Patient has already had vestibular rehab through occupational therapy.  These notes was also reviewed.  Patient did not make progress in terms of her dizziness/unsteadiness with therapy.    ENT Notes reviewed and summarized (11/2)  This patient is a 71yo F with neuropathy and postural imbalance. She states multiple times in our encounter that she has not had any vertigo and that her issue is the unsteadiness on her feet. She has been to therapy and should continue this. She has no otologic source for her imbalance. She also has no symptoms or signs consistent with ear or sinus infections, and the symptoms she described earlier this year that were diagnosed as sinus infection are more consistent with viral URI or allergy. Her headache symptoms are consistent with either migraine, as diagnosed by her Neurologist, or TMJ arthritis as found on her exam.     EMG  CLINICAL INTERPRETATION:  This is a normal nerve conduction and EMG study. Further clinical correlation is needed.     IMPRESSION/REPORT/PLAN  Benign paroxysmal positional vertigo, unspecified laterality  Sinusitis, unspecified chronicity, unspecified location  R/o Neuropathy  Other migraine without status migrainosus, not intractable  Unsteadiness    This is a 70 year old female with multiple problems.  Her unsteadiness/dizziness is improving with physical therapy.  EMG further was negative today.  Blood work overall was noncontributory.  ENT does not suggest any inner ear pathology.  At this point I would  continue to monitor her symptoms.  At this point I would recommend that she continue her physical therapy.  In terms of the headaches these are sporadic and under good control with the Maxalt.  Encouraged her to use a lower dose as appropriate.  I can see her in 3 to 6 months.    -     rizatriptan (MAXALT) 10 MG tablet; Take 0.5 tablets (5 mg) by mouth at onset of headache for migraine May repeat in 2 hours.    Over 40 minutes were spent coordinating the care for the patient today.  More than 50% of the time was spent counseling, educating the patient.    Pierre Herrera MD  Neurologist  [facility]  Tel:- [phone]    This note was dictated using voice recognition software.  Any grammatical or context distortions are unintentional and inherent to the software.

## 2020-11-02 NOTE — LETTER
11/2/2020         RE: Max Thompson  [address] [facility] [address]        Dear Colleague,    Thank you for referring your patient, Max Thompson, to the [facility] NEUROLOGY CLINIC [place]. Please see a copy of my visit note below.    NEUROLOGY OUTPATIENT CONSULT NOTE  Nov 2, 2020     CHIEF COMPLAINT/REASON FOR VISIT/REASON FOR CONSULT  Patient presents with:  Follow Up  Benign paroxysmal positional vertigo, unspecified laterality    REASON FOR CONSULTATION- Dizziness    REFERRAL SOURCE  Dr. Zaina Lewis  CC Dr. Zaina Lewis    HISTORY OF PRESENT ILLNESS  Max Thompson is a 70 year old female seen for multiple symptoms.  Patient symptoms are little bit weak.  Her symptoms initially started in July when she was diagnosed with a sinus infection and dry eye-like symptoms/itchy eyes.  She also developed bumps under her eye.  She was also having some vertigo at that point.  She was initially treated with antibiotics thinking that she had sinusitis or upper respiratory infection.  This did not improve and she did try multiple rounds of antibiotics.  Subsequent to that she has developed vertigo.  Physical therapy was tried without any benefit.  The vertigo is brought on by moving the head too fast.  Would only last for 30 seconds.  Dizziness is present when she is sitting.  Currently she is not dizzy.  She does have hearing loss and muffled hearing.  This is new for her.    In addition to this patient also has unsteadiness.  The unsteadiness is there all the time.  Mainly happens when she tries to stand up and walk.  She reports no numbness in her feet.  Patient also noticed swelling on the right knee.  She has an MRI of her knee scheduled.  Reports the symptoms are present since July.    She also has headaches this can be frontal or occipital in location.  They can be unilateral or bilateral.  They are generally throbbing in nature.  There is significant photophobia and phonophobia.  She  occasionally still has nausea.  She has not really tried any prescription medications for this.  Only takes over-the-counter medications which is not very beneficial.    11/2/20  Patient returns today.  1.  She reports that she continues to stay dizzy.  She has been working with physical therapy which has been helpful.  She was seen by ENT and they did not think she had benign positional proximal vertigo.  They thought maybe the dizziness was related to her migraines.  2.  Patient was complaining of issues with sinusitis.  She was seen by ENT and she did not have sinusitis.  3.  In terms of her headaches she still has them occasionally.  Maxalt did work to abort the headache but she had a lot of side effects felt the medication was too strong.  Encouraged her to split the pill to see if a lower dose would be helpful for  4.  In terms of the unsteadiness she reports improvement.  She is working with physical therapy.  She is mainly standing in a corner trying to close her eyes and see if her balance improves.  Sometimes she tries to stand on one leg.  Reports improvement.  Reports no other new symptoms.  No numbness in her feet at this point.    Previous history is reviewed and this is unchanged.    PAST MEDICAL/SURGICAL HISTORY  History reviewed. No pertinent past medical history.  There is no problem list on file for this patient.  Significant for high cholesterol    FAMILY HISTORY  Family History   Problem Relation Age of Onset     Cancer Son    Family history negative for neuropathy    SOCIAL HISTORY  Social History     Tobacco Use     Smoking status: Former Smoker     Smokeless tobacco: Former User   Substance Use Topics     Alcohol use: Yes     Comment: Occasionally     Drug use: Never       SYSTEMS REVIEW  Twelve-system ROS was done and other than the HPI this was negative for new symptoms.  MEDICATIONS       atorvastatin (LIPITOR) 10 MG tablet, Take 1 tablet by mouth daily       LANsoprazole (PREVACID) 30 MG  "DR capsule, Take 1 capsule by mouth daily       LORazepam (ATIVAN) 1 MG tablet, Take 1 mg by mouth as needed    No current facility-administered medications on file prior to visit.        PHYSICAL EXAMINATION  VITALS: BP (!) 140/81   Pulse 82   Ht 1.676 m (5' 6\")   Wt 73.5 kg (162 lb)   BMI 26.15 kg/m    GENERAL: Healthy appearing, alert, no acute distress, normal habitus.  CARDIOVASCULAR: Extremities warm and well perfused. Pulses present.   NEUROLOGICAL:  Patient is awake and oriented to self, place and time.  Attention span is normal.  Memory is grossly intact.  Language is fluent and follows commands appropriately.  Appropriate fund of knowledge. Cranial nerves 2-12 are intact. There is no pronator drift.  Motor exam shows 5/5 strength in all extremities.  Tone is symmetric bilaterally in upper and lower extremities.  Reflexes are symmetric and absent in upper extremities and lower extremities. Sensory exam is grossly intact to light touch, pin prick and vibration.  Finger to nose and heel to shin is without dysmetria.  Romberg is negative today.  Gait is normal and the patient is able to do tandem walk.    DIAGNOSTICS  LABS  Component      Latest Ref Rng & Units 9/23/2020   Albumin %      51.0 - 67.0 % 62.4   Albumin       3.2 - 4.7 g/dL 4.2   Alpha 1 %      2.0 - 4.0 % 2.5   Alpha 1      0.1 - 0.3 g/dL 0.2   Alpha 2 %      5.0 - 13.0 % 10.6   Alpha 2      0.4 - 0.9 g/dL 0.7   % Beta      10.0 - 17.0 % 11.2   Beta      0.7 - 1.2 g/dL 0.8   Gamma Globulin %      9.0 - 20.0 % 13.3   Gamma Globulin      0.6 - 1.4 g/dL 0.9   ELP Comment       Unremarkable protein electrophoresis.   Protein, Total      6.0 - 8.0 g/dL 6.8   Path ICD:       G58.9   Interpreted By:       Kiet Garnett MD   Glucose      70 - 125 mg/dL 106   Patient Fasting > 8hrs?       No   Vitamin B1, Whole Blood      70 - 180 nmol/L 136   MMA Serum/Plasma, Vitamin B12 Status      0.00 - 0.40 umol/L 0.16   Lyme Antibody Cascade      <0.90 " Index Value 0.04   TSH      0.30 - 5.00 uIU/mL 1.89   Ferritin      10 - 130 ng/mL 155 (H)   Ceruloplasmin      20 - 60 mg/dL 32   Vitamin B-12      213 - 816 pg/mL 404   Copper, Serum/Plasma      80.0 - 155.0 ug/dL 118.2   Sed Rate      0 - 20 mm/hr 9   CK, Total      30 - 190 U/L 42   DENISE Screen [place]      <=2.9 U 0.2       OUTSIDE RECORDS  Outside referral notes were reviewed and pertinent information has been summarized:- Patient was seen in primary care clinic in August 27, 2020.  She was thought to have benign paroxysmal positional vertigo.  Was referred to neurology.  An MRI of the knee to evaluate her right knee pain.  X-ray of the knee done did not show any significant structural lesions.  Patient has already had vestibular rehab through occupational therapy.  These notes was also reviewed.  Patient did not make progress in terms of her dizziness/unsteadiness with therapy.    ENT Notes reviewed and summarized (11/2)  This patient is a 71yo F with neuropathy and postural imbalance. She states multiple times in our encounter that she has not had any vertigo and that her issue is the unsteadiness on her feet. She has been to therapy and should continue this. She has no otologic source for her imbalance. She also has no symptoms or signs consistent with ear or sinus infections, and the symptoms she described earlier this year that were diagnosed as sinus infection are more consistent with viral URI or allergy. Her headache symptoms are consistent with either migraine, as diagnosed by her Neurologist, or TMJ arthritis as found on her exam.     EMG  CLINICAL INTERPRETATION:  This is a normal nerve conduction and EMG study. Further clinical correlation is needed.     IMPRESSION/REPORT/PLAN  Benign paroxysmal positional vertigo, unspecified laterality  Sinusitis, unspecified chronicity, unspecified location  R/o Neuropathy  Other migraine without status migrainosus, not intractable  Unsteadiness    This is a 70  year old female with multiple problems.  Her unsteadiness/dizziness is improving with physical therapy.  EMG further was negative today.  Blood work overall was noncontributory.  ENT does not suggest any inner ear pathology.  At this point I would continue to monitor her symptoms.  At this point I would recommend that she continue her physical therapy.  In terms of the headaches these are sporadic and under good control with the Maxalt.  Encouraged her to use a lower dose as appropriate.  I can see her in 3 to 6 months.    -     rizatriptan (MAXALT) 10 MG tablet; Take 0.5 tablets (5 mg) by mouth at onset of headache for migraine May repeat in 2 hours.    Over 40 minutes were spent coordinating the care for the patient today.  More than 50% of the time was spent counseling, educating the patient.    Pierre Herrera MD  Neurologist  [facility]  Tel:- [phone]    This note was dictated using voice recognition software.  Any grammatical or context distortions are unintentional and inherent to the software.         Again, thank you for allowing me to participate in the care of your patient.        Sincerely,        Pierre Herrera MD

## 2020-11-02 NOTE — NURSING NOTE
Chief Complaint   Patient presents with     Follow Up     Benign paroxysmal positional vertigo, unspecified laterality     Anastasia Eugene CMA on 11/2/2020 at 11:09 AM

## 2020-11-02 NOTE — PROCEDURES
[facility]    [facility], [address]  [place] [address]  Tel: [phone]  Fax: [phone]          Full Name: Max Thompson Gender: Female  Patient ID: [id number] YOB: 1950      Visit Date: 11/2/2020 10:54  Age: 70 Years 1 Months Old  Interpreted By: Pierre Herrera MD   Ref Dr.: Michele Peralta MD  Tech:    EDX Exam: EMG   Height: 5 feet 5 inch  Reason for referral: Evaluate bilateral lowers. c/o dizziness, headaches, numbness, tingling in legs/feet/toes > 6 months. Right = Left. h/o cortisone shot in right knee.      Motor NCS      Nerve / Sites Lat Amp Dist Jamie    ms mV cm m/s   R Peroneal - EDB      Ankle 5.21 6.5 8       Fib head 12.14 6.0 29 42      Pop fossa 14.27 5.7 10 47   L Peroneal - EDB      Ankle 5.00 5.0 8       Fib head 11.41 4.9 27 42      Pop fossa 13.85 4.2 10 41   R Tibial - AH      Ankle 5.26 5.5 8       Pop fossa 13.39 5.0 35 43   L Tibial - AH      Ankle 6.56 11.5 8       Pop fossa 14.79 10.7 34 41       F  Wave      Nerve Fmin    ms   R Tibial - AH 49.22   L Tibial - AH 52.50       Sensory NCS      Nerve / Sites Onset Lat Pk Lat Amp.2-3 Dist Jamie    ms ms  V cm m/s   R Sural - Ankle (Calf)      Calf 2.66 3.13 16.9 14 53   L Sural - Ankle (Calf)      Calf 3.44 4.32 16.5 14 41   R Superficial peroneal - Ankle      Lat leg 2.81 3.70 16.0 12 43   L Superficial peroneal - Ankle      Lat leg 2.92 3.80 16.5 12 41       EMG Summary Table     Spontaneous MUAP Rcmt Note   Muscle Fib PSW Fasc IA # Amp Dur PPP Rate Type   R. Gluteus medius None None None N N N N N N N   R. Gluteus igor None None None N N N N N N N   R. L3 paraspinal None None None N N N N N N N   R. L4 paraspinal None None None N N N N N N N   R. L5 paraspinal None None None N N N N N N N   R. S1 paraspinal None None None N N N N N N N   R. Adductor subhash None None None N N N N N N N   R. Quadriceps None None None N N N N N N N    R. Gastrocnemius (Medial head) None None None N N N N N N N   R. Tibialis anterior None None None N N N N N N N   R. Tibialis posterior None None None N N N N N N N     SUMMARY  Nerve conduction and EMG study of bilateral lower extremities shows:    Normal right peroneal distal motor latency, amplitude and conduction velocity.  Normal left peroneal distal motor latency, amplitude and conduction velocity.  Normal right tibial distal motor latency, amplitude and conduction velocity.  Normal left tibial distal motor latency, amplitude and conduction velocity.  Normal bilateral tibial F latencies.  Normal bilateral sural and superficial peroneal sensory SNAP.  Monopolar needle exam is normal.      CLINICAL INTERPRETATION:  This is a normal nerve conduction and EMG study. Further clinical correlation is needed.     Pierre Herrera MD  Neurologist  [facility]  Tel:- [phone]

## 2020-11-02 NOTE — LETTER
11/2/2020         RE: Max Thompson  [address] [facility] [address]        Dear Colleague,    Thank you for referring your patient, Max Thompson, to the [facility] NEUROLOG[facility] [place]. Please see a copy of my visit note below.    See procedure note      Again, thank you for allowing me to participate in the care of your patient.        Sincerely,        Pierre Herrera MD

## 2020-11-05 ENCOUNTER — OFFICE VISIT - HEALTHEAST (OUTPATIENT)
Dept: FAMILY MEDICINE | Facility: CLINIC | Age: 70
End: 2020-11-05

## 2020-11-05 DIAGNOSIS — F32.1 MODERATE MAJOR DEPRESSION (H): ICD-10-CM

## 2020-11-05 DIAGNOSIS — F41.9 ANXIETY: ICD-10-CM

## 2020-11-05 ASSESSMENT — MIFFLIN-ST. JEOR: SCORE: 1283.35

## 2020-11-15 ASSESSMENT — PATIENT HEALTH QUESTIONNAIRE - PHQ9: SUM OF ALL RESPONSES TO PHQ QUESTIONS 1-9: 20

## 2020-11-27 ENCOUNTER — COMMUNICATION - HEALTHEAST (OUTPATIENT)
Dept: FAMILY MEDICINE | Facility: CLINIC | Age: 70
End: 2020-11-27

## 2020-11-27 DIAGNOSIS — F41.9 ANXIETY: ICD-10-CM

## 2020-11-27 DIAGNOSIS — F32.1 MODERATE MAJOR DEPRESSION (H): ICD-10-CM

## 2020-12-14 ENCOUNTER — RECORDS - HEALTHEAST (OUTPATIENT)
Dept: ADMINISTRATIVE | Facility: OTHER | Age: 70
End: 2020-12-14

## 2021-03-02 ENCOUNTER — RECORDS - HEALTHEAST (OUTPATIENT)
Dept: ADMINISTRATIVE | Facility: OTHER | Age: 71
End: 2021-03-02

## 2021-03-29 ENCOUNTER — COMMUNICATION - HEALTHEAST (OUTPATIENT)
Dept: FAMILY MEDICINE | Facility: CLINIC | Age: 71
End: 2021-03-29

## 2021-03-29 DIAGNOSIS — E78.5 HYPERLIPIDEMIA, UNSPECIFIED HYPERLIPIDEMIA TYPE: ICD-10-CM

## 2021-03-29 DIAGNOSIS — K21.9 GERD (GASTROESOPHAGEAL REFLUX DISEASE): ICD-10-CM

## 2021-04-28 ENCOUNTER — RECORDS - HEALTHEAST (OUTPATIENT)
Dept: ADMINISTRATIVE | Facility: OTHER | Age: 71
End: 2021-04-28

## 2021-04-28 ENCOUNTER — OFFICE VISIT - HEALTHEAST (OUTPATIENT)
Dept: FAMILY MEDICINE | Facility: CLINIC | Age: 71
End: 2021-04-28

## 2021-04-28 DIAGNOSIS — F32.1 MODERATE MAJOR DEPRESSION (H): ICD-10-CM

## 2021-04-28 DIAGNOSIS — Z00.00 MEDICARE ANNUAL WELLNESS VISIT, SUBSEQUENT: ICD-10-CM

## 2021-04-28 DIAGNOSIS — K21.9 GERD (GASTROESOPHAGEAL REFLUX DISEASE): ICD-10-CM

## 2021-04-28 DIAGNOSIS — F41.9 ANXIETY: ICD-10-CM

## 2021-04-28 DIAGNOSIS — Z12.31 ENCOUNTER FOR SCREENING MAMMOGRAM FOR MALIGNANT NEOPLASM OF BREAST: ICD-10-CM

## 2021-04-28 DIAGNOSIS — F17.210 CIGARETTE NICOTINE DEPENDENCE WITHOUT COMPLICATION: ICD-10-CM

## 2021-04-28 DIAGNOSIS — E78.5 HYPERLIPIDEMIA, UNSPECIFIED HYPERLIPIDEMIA TYPE: ICD-10-CM

## 2021-04-28 DIAGNOSIS — E55.9 VITAMIN D DEFICIENCY: ICD-10-CM

## 2021-04-28 ASSESSMENT — PATIENT HEALTH QUESTIONNAIRE - PHQ9: SUM OF ALL RESPONSES TO PHQ QUESTIONS 1-9: 3

## 2021-04-28 ASSESSMENT — MIFFLIN-ST. JEOR: SCORE: 1288.79

## 2021-04-30 ENCOUNTER — AMBULATORY - HEALTHEAST (OUTPATIENT)
Dept: LAB | Facility: CLINIC | Age: 71
End: 2021-04-30

## 2021-04-30 DIAGNOSIS — E55.9 VITAMIN D DEFICIENCY: ICD-10-CM

## 2021-04-30 DIAGNOSIS — E78.5 HYPERLIPIDEMIA, UNSPECIFIED HYPERLIPIDEMIA TYPE: ICD-10-CM

## 2021-04-30 LAB
ALBUMIN SERPL-MCNC: 3.8 G/DL (ref 3.5–5)
ALP SERPL-CCNC: 107 U/L (ref 45–120)
ALT SERPL W P-5'-P-CCNC: 18 U/L (ref 0–45)
ANION GAP SERPL CALCULATED.3IONS-SCNC: 9 MMOL/L (ref 5–18)
AST SERPL W P-5'-P-CCNC: 18 U/L (ref 0–40)
BILIRUB DIRECT SERPL-MCNC: 0.2 MG/DL
BILIRUB SERPL-MCNC: 0.6 MG/DL (ref 0–1)
BUN SERPL-MCNC: 14 MG/DL (ref 8–28)
CALCIUM SERPL-MCNC: 9.3 MG/DL (ref 8.5–10.5)
CHLORIDE BLD-SCNC: 107 MMOL/L (ref 98–107)
CHOLEST SERPL-MCNC: 170 MG/DL
CO2 SERPL-SCNC: 26 MMOL/L (ref 22–31)
CREAT SERPL-MCNC: 0.66 MG/DL (ref 0.6–1.1)
ERYTHROCYTE [DISTWIDTH] IN BLOOD BY AUTOMATED COUNT: 12.5 % (ref 11–14.5)
FASTING STATUS PATIENT QL REPORTED: YES
GFR SERPL CREATININE-BSD FRML MDRD: >60 ML/MIN/1.73M2
GLUCOSE BLD-MCNC: 89 MG/DL (ref 70–125)
HCT VFR BLD AUTO: 42.4 % (ref 35–47)
HDLC SERPL-MCNC: 60 MG/DL
HGB BLD-MCNC: 13.7 G/DL (ref 12–16)
LDLC SERPL CALC-MCNC: 65 MG/DL
MCH RBC QN AUTO: 30.6 PG (ref 27–34)
MCHC RBC AUTO-ENTMCNC: 32.3 G/DL (ref 32–36)
MCV RBC AUTO: 95 FL (ref 80–100)
PLATELET # BLD AUTO: 226 THOU/UL (ref 140–440)
PMV BLD AUTO: 9.8 FL (ref 7–10)
POTASSIUM BLD-SCNC: 4.7 MMOL/L (ref 3.5–5)
PROT SERPL-MCNC: 6.7 G/DL (ref 6–8)
RBC # BLD AUTO: 4.47 MILL/UL (ref 3.8–5.4)
SODIUM SERPL-SCNC: 142 MMOL/L (ref 136–145)
TRIGL SERPL-MCNC: 223 MG/DL
WBC: 5 THOU/UL (ref 4–11)

## 2021-05-03 LAB — 25(OH)D3 SERPL-MCNC: 10.3 NG/ML (ref 30–80)

## 2021-05-21 ENCOUNTER — COMMUNICATION - HEALTHEAST (OUTPATIENT)
Dept: FAMILY MEDICINE | Facility: CLINIC | Age: 71
End: 2021-05-21

## 2021-05-21 DIAGNOSIS — F17.210 CIGARETTE NICOTINE DEPENDENCE WITHOUT COMPLICATION: ICD-10-CM

## 2021-05-25 ENCOUNTER — RECORDS - HEALTHEAST (OUTPATIENT)
Dept: ADMINISTRATIVE | Facility: CLINIC | Age: 71
End: 2021-05-25

## 2021-05-26 ENCOUNTER — RECORDS - HEALTHEAST (OUTPATIENT)
Dept: ADMINISTRATIVE | Facility: CLINIC | Age: 71
End: 2021-05-26

## 2021-05-27 ENCOUNTER — RECORDS - HEALTHEAST (OUTPATIENT)
Dept: ADMINISTRATIVE | Facility: CLINIC | Age: 71
End: 2021-05-27

## 2021-05-27 VITALS — HEART RATE: 86 BPM | SYSTOLIC BLOOD PRESSURE: 141 MMHG | TEMPERATURE: 98.1 F | DIASTOLIC BLOOD PRESSURE: 78 MMHG

## 2021-05-27 VITALS — RESPIRATION RATE: 16 BRPM

## 2021-05-29 ENCOUNTER — RECORDS - HEALTHEAST (OUTPATIENT)
Dept: ADMINISTRATIVE | Facility: CLINIC | Age: 71
End: 2021-05-29

## 2021-05-29 NOTE — PROGRESS NOTES
Assessment/Plan:         Max was seen today for ear fullness.    Diagnoses and all orders for this visit:    Acute non-recurrent maxillary sinusitis: symptoms consistent with viral sinusitis. Dizziness is not significant, may be from sinusitis vs sudafed. Will stop sudafed, can use mucinex if a lot of congestion, but otherwise push fluids, nsaids, rest. Discussed concerning symptoms for which to return.                  Plan of care was discussed with the patient and/or guardian. They verbalize understanding of the treatment options and plan of care.    Noemí Frey       Subjective:        Max Thompson is a 68 y.o. female who presents for ear fullness, head pressure, congestion, sore throat and a sense of dizziness.   Started 5 days ago with congestion, the rest of the symptoms appeared the next day.   Dizziness is not vertiginous. It is mild, just a sense of being off. Worsened after taking sudafed.  She has no fever, chills, nausea. No chest pain, shortness of breath, palpitations, LE swelling, cough.     She has a history of vertigo - this is not similar.  Has a history of sinus infection - this is not as severe.     No sick contacts.   Is taking only the sudafed at this point for her symptoms, no other meds.          Objective:       Blood pressure 109/72, pulse 85, temperature 98  F (36.7  C), temperature source Oral, resp. rate 16, weight 167 lb 7 oz (75.9 kg), last menstrual period 03/15/2015, SpO2 98 %.   Gen: well appearing overall, masked, no distress  Card: RRR, no murmur, normal S1/S2. No pedal edema.  Resp: clear to auscultation bilaterally, no wheeze or crackles.   HEENT: Head - normocephalic, atraumatic, mild pressure of the maxillary sinuses.   Eyes - normal lids and conjuntivae, EOMs intact   Nose - no deformity, without masses, no rhinorrhea  Oropharynx - Mild posterior pharyngeal erythema. Oral mucosa otherwise normal, moist mucous membranes   Ears: TMs normal bilaterally though  slightly bulged with clear fluid, normal canals.

## 2021-05-30 ENCOUNTER — COMMUNICATION - HEALTHEAST (OUTPATIENT)
Dept: FAMILY MEDICINE | Facility: CLINIC | Age: 71
End: 2021-05-30

## 2021-05-31 VITALS — WEIGHT: 167.9 LBS | BODY MASS INDEX: 27.94 KG/M2

## 2021-05-31 VITALS — WEIGHT: 167 LBS | BODY MASS INDEX: 27.82 KG/M2 | HEIGHT: 65 IN

## 2021-06-01 ENCOUNTER — RECORDS - HEALTHEAST (OUTPATIENT)
Dept: ADMINISTRATIVE | Facility: CLINIC | Age: 71
End: 2021-06-01

## 2021-06-01 NOTE — TELEPHONE ENCOUNTER
Message relayed to patient. She states she might be making an appointment in the next week or so if the blisters under her eyes don't go away.

## 2021-06-01 NOTE — TELEPHONE ENCOUNTER
Unfortunately, he is not able to be seen in clinic today.  It appears he may have had an allergic reaction and was started on prednisone. He should continue the prednisone.  He can take Benadryl every 6 hours in the short-term.  This may cause drowsiness.  If symptoms are persistent then he should be reevaluated and he can go back to walk-in care if there are concerns.  If symptoms are severe and swelling is getting worse I recommend that he go to the emergency department.  I will be seeing a 3:00 patient tomorrow and could potentially see him for reevaluation around that time if needed.

## 2021-06-01 NOTE — TELEPHONE ENCOUNTER
Spoke to pt, she feels the itching has improved since starting the Benadryl.  Eyes are still swollen, but feels she does not need to be seen today.      How long is she able to stay on the Benadryl?

## 2021-06-01 NOTE — PROGRESS NOTES
Chief Complaint   Patient presents with     Eye Problem     states x 2 weeks, has had itching in and around both eyes has tried benedryl and eye lid wipes, rash around eyes         HPI      Patient is here for 2 weeks of itching of eyes and eyelids, with rash around eyelids, partially improved with Benadryl. There is intermittent mild eye redness and watery discharge. No eye pain. NO fever, chills, photophobia, foreign body sensation.     ROS: Pertinent ROS noted in HPI.     No Known Allergies    Patient Active Problem List   Diagnosis     Anxiety     Esophageal Reflux     Hyperlipidemia     Osteopenia     Vitamin D deficiency     Cigarette nicotine dependence without complication       No family history on file.    Social History     Socioeconomic History     Marital status:      Spouse name: Not on file     Number of children: Not on file     Years of education: Not on file     Highest education level: Not on file   Occupational History     Not on file   Social Needs     Financial resource strain: Not on file     Food insecurity:     Worry: Not on file     Inability: Not on file     Transportation needs:     Medical: Not on file     Non-medical: Not on file   Tobacco Use     Smoking status: Current Every Day Smoker     Packs/day: 0.50     Smokeless tobacco: Never Used     Tobacco comment: started around 1975   Substance and Sexual Activity     Alcohol use: Not on file     Drug use: Not on file     Sexual activity: Not on file   Lifestyle     Physical activity:     Days per week: Not on file     Minutes per session: Not on file     Stress: Not on file   Relationships     Social connections:     Talks on phone: Not on file     Gets together: Not on file     Attends Denominational service: Not on file     Active member of club or organization: Not on file     Attends meetings of clubs or organizations: Not on file     Relationship status: Not on file     Intimate partner violence:     Fear of current or ex partner:  Not on file     Emotionally abused: Not on file     Physically abused: Not on file     Forced sexual activity: Not on file   Other Topics Concern     Not on file   Social History Narrative     Not on file         Objective:    Vitals:    09/04/19 1043   BP: 127/81   Pulse: 85   Temp: 97.6  F (36.4  C)   SpO2: 99%       Gen:NAD  Eyes: normal conjunctiva, corneas grossly clear. There are mild erythematous papular eruptions below bilateral lower eyelids with mild edema. COLBY PEÑA.      Eyelid dermatitis, allergic/contact  -     predniSONE (DELTASONE) 20 MG tablet; Take 40 mg by mouth daily for 5 days.  -     olopatadine (PATANOL) 0.1 % ophthalmic solution; Administer 1 drop to both eyes 2 (two) times a day.

## 2021-06-01 NOTE — TELEPHONE ENCOUNTER
Spoke to pt, relayed MD message.  Writer will call pt back tomorrow morning and see how she is doing to see if she feels she needs to be seen tomorrow.

## 2021-06-01 NOTE — TELEPHONE ENCOUNTER
"Meant \"she\" in statement below.    Please check in with her. She can take benadryl as long as needed. As noted, benadryl can cause drowsiness. If having recurrent symptoms I encourage a follow-up appointment. I can work her in on Monday.       "

## 2021-06-01 NOTE — TELEPHONE ENCOUNTER
RN Triage:    Spoke with 68 yr old Luz who c/o waking up with severe itching on her arms, legs, back, chest, face and eyes.  Pt took Benadryl which has been helpful.    Pt denies hives or rash at this time.    Pt states she was seen 5 days ago at [facility] for itchy and watery eyes.  Started prednisone 40 mg x 5 days and eye drops.    Pt reports slight improvement with itchy eyes on the prednisone, but finished the medication yesterday.    Pt states she has some dried up blisters under the lower eye lids.    Eyes are still swollen.    Denies difficulty swallowing or breathing.    Reports a couple weeks ago was trimming bushes outside.    Denies fever or pain.    PLAN:  Advised OV today per protocol.  Will consult with PCP regarding ability to work pt into office schedule today or other instruction.  Please advise.  Advised pt may take Benadryl for itching.  Advised to call back if symptoms worsen while awaiting a response back from the clinic.  Pt voiced understanding.    Luz Sharp RN   [facility] RN Triage      Reason for Disposition    MODERATE-SEVERE widespread itching (i.e., interferes with sleep, normal activities or school) and not improved after 24 hours of itching Care Advice    Protocols used: ITCHING - WIDESPREAD-A-OH

## 2021-06-02 VITALS — WEIGHT: 169.1 LBS | BODY MASS INDEX: 28.14 KG/M2

## 2021-06-03 VITALS
TEMPERATURE: 97.6 F | OXYGEN SATURATION: 99 % | SYSTOLIC BLOOD PRESSURE: 127 MMHG | WEIGHT: 166 LBS | BODY MASS INDEX: 27.62 KG/M2 | DIASTOLIC BLOOD PRESSURE: 81 MMHG | HEART RATE: 85 BPM

## 2021-06-03 VITALS — BODY MASS INDEX: 27.86 KG/M2 | WEIGHT: 167.44 LBS

## 2021-06-06 NOTE — TELEPHONE ENCOUNTER
RN cannot approve Refill Request    RN can NOT refill this medication PCP messaged that patient is overdue for Office Visit. Last office visit: 1/21/2019 Michele Deleon MD Last Physical: Visit date not found Last MTM visit: Visit date not found Last visit same specialty: 1/21/2019 Michele Deleon MD.  Next visit within 3 mo: Visit date not found  Next physical within 3 mo: Visit date not found      Lu Posadas, [facility] Triage/Med Refill 3/1/2020    Requested Prescriptions   Pending Prescriptions Disp Refills     lansoprazole (PREVACID) 30 MG capsule [Pharmacy Med Name: LANSOPRAZOLE DR 30 MG CAPSULE] 90 capsule 3     Sig: TAKE 1 CAPSULE BY MOUTH EVERY DAY       GI Medications Refill Protocol Failed - 3/1/2020 11:13 AM        Failed - PCP or prescribing provider visit in last 12 or next 3 months.     Last office visit with prescriber/PCP: 1/21/2019 Michele Deleon MD OR same dept: Visit date not found OR same specialty: 1/21/2019 Michele Deleon MD  Last physical: Visit date not found Last MTM visit: Visit date not found   Next visit within 3 mo: Visit date not found  Next physical within 3 mo: Visit date not found  Prescriber OR PCP: Michele Deleon MD  Last diagnosis associated with med order: 1. GERD (gastroesophageal reflux disease)  - lansoprazole (PREVACID) 30 MG capsule [Pharmacy Med Name: LANSOPRAZOLE DR 30 MG CAPSULE]; TAKE 1 CAPSULE BY MOUTH EVERY DAY  Dispense: 90 capsule; Refill: 3    If protocol passes may refill for 12 months if within 3 months of last provider visit (or a total of 15 months).             atorvastatin (LIPITOR) 10 MG tablet [Pharmacy Med Name: ATORVASTATIN 10 MG TABLET] 90 tablet 3     Sig: TAKE 1 TABLET BY MOUTH EVERY DAY       Statins Refill Protocol (Hmg CoA Reductase Inhibitors) Failed - 3/1/2020 11:13 AM        Failed - PCP or prescribing provider visit in past 12 months      Last office visit with prescriber/PCP: 1/21/2019  Michele Deleon MD OR same dept: Visit date not found OR same specialty: 1/21/2019 Michele Deleon MD  Last physical: Visit date not found Last MTM visit: Visit date not found   Next visit within 3 mo: Visit date not found  Next physical within 3 mo: Visit date not found  Prescriber OR PCP: Michele Deleon MD  Last diagnosis associated with med order: 1. GERD (gastroesophageal reflux disease)  - lansoprazole (PREVACID) 30 MG capsule [Pharmacy Med Name: LANSOPRAZOLE DR 30 MG CAPSULE]; TAKE 1 CAPSULE BY MOUTH EVERY DAY  Dispense: 90 capsule; Refill: 3    If protocol passes may refill for 12 months if within 3 months of last provider visit (or a total of 15 months).

## 2021-06-09 NOTE — PROGRESS NOTES
"Assessment:   1. Skin lesion  Ambulatory referral to Dermatology   2. Eye discharge  polymyxin B-trimethoprim (POLYTRIM) 10,000 unit- 1 mg/mL Drop ophthalmic drops   3. OME (otitis media with effusion), right  azithromycin (ZITHROMAX) 250 MG tablet   4. Benign paroxysmal positional vertigo, unspecified laterality  Ambulatory referral to PT/OT       Plan:   Z-pack given for right ear infection and sinus infection.    Polytrim eye drops given.    Vestibiular OT referral for vertigo    Neurology consult if vertigo persists after therapy.    If after treatment, the eye discharge or symptoms persist, please see an eye doctor.    Dermatology consult for skin lesion under eyes.    Our specialty  will help set your appointments.      Subjective:  Chief Complaint   Patient presents with     Ear Fullness     x 1 week     Dizziness     x 1 week     Nausea     x couple days     Eye Drainage     x 1 month      Mxa Thompson, a 69 y.o. year old, comes in to clinic for see above.    Eye discharge:  White eye discharge for a month, thick.  Noticed some redness of left eye had been red. No pain with moving eyes. No vision change.    Vertigo: Balance:  Right leg is weak becuase she is \"trying to stay balanced\"  In the past given antibiotic and treated for vertigo.  Did vestibular OT.  Given Meclizine. Feel like thing are moving for about a week or more.     Ear fullness:  Nasal congestion.  Some head pain at times.  No fever or chills. No ear pain but \"feel funny\" right > left.    Nausea: Goes with the balance issues.    Usually walks 2 miles a day, but now has had cut that down because of symptoms.      Current Outpatient Medications   Medication Sig     atorvastatin (LIPITOR) 10 MG tablet TAKE 1 TABLET BY MOUTH EVERY DAY     lansoprazole (PREVACID) 30 MG capsule TAKE 1 CAPSULE BY MOUTH EVERY DAY     olopatadine (PATANOL) 0.1 % ophthalmic solution Administer 1 drop to both eyes 2 (two) times a day.     " phenylephrine-acetaminophen (SUDAFED PE PRESSURE+PAIN) 5-325 mg Tab Take by mouth.     azithromycin (ZITHROMAX) 250 MG tablet 2 pills on day 1, then 1 pill a day for 4 days     buPROPion (WELLBUTRIN SR) 150 MG 12 hr tablet Take one PO QAM for one week then increase to BID     LORazepam (ATIVAN) 1 MG tablet Take 1 tablet (1 mg total) by mouth daily as needed for anxiety.     polymyxin B-trimethoprim (POLYTRIM) 10,000 unit- 1 mg/mL Drop ophthalmic drops 2 drops to the affected eye or eyes 4 times a day for 1 week       Patient Active Problem List   Diagnosis     Anxiety     Esophageal Reflux     Hyperlipidemia     Osteopenia     Vitamin D deficiency     Cigarette nicotine dependence without complication         Objective:  /83 (Patient Site: Left Arm, Patient Position: Sitting, Cuff Size: Adult Regular)   Pulse 76   Temp (!) 96.3  F (35.7  C) (Oral)   Resp 16   Wt 168 lb (76.2 kg)   LMP 03/15/2015   BMI 27.96 kg/m    General: No apparent distress  HEENT: Sclera and conjunctiva slightly pink, oropharynx clear, tympanic membrane gray good light reflex on left, pink and bulging on the right

## 2021-06-09 NOTE — TELEPHONE ENCOUNTER
COVID 19 Nurse Triage Plan/Patient Instructions    Please be aware that novel coronavirus (COVID-19) may be circulating in the community. If you develop symptoms such as fever, cough, or SOB or if you have concerns about the presence of another infection including coronavirus (COVID-19), please contact your health care provider or visit [email or web address].     Disposition/Instructions    In-Person Visit with provider recommended. Reference Visit Selection Guide.    Thank you for taking steps to prevent the spread of this virus.  o Limit your contact with others.  o Wear a simple mask to cover your cough.  o Wash your hands well and often.    Resources    [facility]: About COVID-19: [email or web address]    CDC: What to Do If You're Sick: www.cdc.gov/coronavirus/2019-ncov/about/steps-when-sick.html    CDC: Ending Home Isolation: www.cdc.gov/coronavirus/2019-ncov/hcp/disposition-in-home-patients.html     CDC: Caring for Someone: www.cdc.gov/coronavirus/2019-ncov/if-you-are-sick/care-for-someone.html     [facility]: Interim Guidance for Hospital Discharge to Home: www.health.[facility].[place]/diseases/coronavirus/hcp/hospdischarge.pdf    [facility] clinical trials (COVID-19 research studies): clinicalaffairs.[facility]-clinical-trials     Below are the COVID-19 hotlines at the [place] Department of [facility] ([facility]). Interpreters are available.   o For health questions: Call 665-514-4441 or 1-917.482.1219 (7 a.m. to 7 p.m.)  o For questions about schools and childcare: Call 203-783-7138 or 1-337.768.6857 (7 a.m. to 7 p.m.)   RN triage   Call from pt   Pt states for the past week has had ' plugged' ears -- R>L   Some nausea and now balance feels ' off' -- no falls   No fever  No ear drainage   No URI   Pt has white drainage / both eyes-- no pain -- some itchy   No vision changes   No redness or swelling around eyes-- a little bloodshot  Reviewed home care advice   Transfer to    Bhavna Hall RN BAN  [facility] RN triage        Reason for Disposition    Ear congestion present > 48 hours    Additional Information    Negative: Ear pain is the main symptom    Negative: Hearing loss (complete or partial) is the main symptom    Negative: Earwax is the main concern    Negative: Has nasal allergies and they are acting up    Negative: Earache lasts > 1 hour    Negative: Pus or cloudy discharge from ear canal    Negative: Patient wants to be seen    Protocols used: EAR - CONGESTION-A-OH

## 2021-06-09 NOTE — TELEPHONE ENCOUNTER
Prior Authorization Request  Who s requesting:  Pharmacy  Pharmacy Name and Location: [facility] #[id number]  Medication Name: LORazepam (ATIVAN) 1 MG tablet   Insurance Plan: [facility] [place]   Insurance Member ID Number:  [id number]  CoverMyMeds Key: N/A  Informed patient that prior authorizations can take up to 10 business days for response:   NA  Okay to leave a detailed message: No

## 2021-06-09 NOTE — PATIENT INSTRUCTIONS - HE
Z-pack given for right ear infection and sinus infection.    Polytrim eye drops given.    Vestibiular OT referral for vertigo    Neurology consult if vertigo persists after therapy.    If after treatment, the eye discharge or symptoms persist, please see an eye doctor.    Dermatology consult for skin lesion under eyes.    Our specialty  will help set your appointments.

## 2021-06-09 NOTE — TELEPHONE ENCOUNTER
Medication: lorazepam (Ativan) 1 mg    Last Date Filled 1/21/19     pulled: no Ca not authorized    Only PCP Prescribing?: unknown    Taken as prescribed from physician notes?: unknown  (01/21/19  Recommend that she take lorazepam sparingly  - LORazepam (ATIVAN) 1 MG tablet; Take 1 tablet (1 mg total) by mouth daily as needed for anxiety.  Dispense: 30 tablet; Refill: 0   )     CSA in last year: NO    Random Utox in last year: NO    Opioids + benzodiazepines? NO

## 2021-06-10 NOTE — TELEPHONE ENCOUNTER
Pt had virtual visit today and was supposed to have an antibiotic sent. No Rx sent to pharmacy and not mentioned in chart yet. Please review.

## 2021-06-10 NOTE — TELEPHONE ENCOUNTER
Yes she should still have the MRI to rule out a mass in the soft tissue or meniscal tear.  Thank you.

## 2021-06-10 NOTE — PROGRESS NOTES
Assessment:   1. Benign paroxysmal positional vertigo, unspecified laterality  Ambulatory referral to Neurology   2. Localized superficial swelling, mass, or lump  XR Knee Right 1 or 2 VWS    MR Knee Without Contrast Right   3. Acute pain of right knee  MR Knee Without Contrast Right   4. Claustrophobia  diazePAM (VALIUM) 2 MG tablet       Plan:   Did Vestibiular OT  for vertigo    During home vestibular exercises.     Neurology consult if vertigo persists after antibiotics.   Referral placed.  [facility]   PHONE: [phone]    Knee x-ray today.  Radiology will over read and we will call you.    Ordered right knee MRI, our schedulers did help set this.    Will give prescription for 2 mg of Valium if needed for the MRI.  Ideally 30 minutes before the exam but you could ask the MRI tech if they could put you in leg first then you may not need a sedation.      Subjective:  Chief Complaint   Patient presents with     Knee Pain     right knee pain with a bump started 2 months ago, bump has increased        Max Thompson, a 69 y.o. year old, comes in to clinic for follow-up.    Vertigo: Patient was seen recently for vertigo.  She has been doing occupational therapy and this had helped but she has not had complete relief.  She is on her second round of antibiotics for a probable ear infection.    Knee:  Her OT noticed egg swelling on lateral right knee for 1 day.  The only new activity would be when she was trying to plant her foot to try to stay stable with this vertigo she may have injured her knee.  No locking, catching or instability.  Discomfort in whole knee lateral, medial and anterior.  Has been aching for weeks.  No swelling behind knee. No redness or warmth.  No fever.       Current Outpatient Medications   Medication Sig Note     atorvastatin (LIPITOR) 10 MG tablet TAKE 1 TABLET BY MOUTH EVERY DAY      cefdinir (OMNICEF) 300 MG capsule Take 1 capsule (300 mg total) by mouth 2 (two) times a day  for 10 days.      lansoprazole (PREVACID) 30 MG capsule TAKE 1 CAPSULE BY MOUTH EVERY DAY      LORazepam (ATIVAN) 1 MG tablet Take 1 tablet (1 mg total) by mouth daily as needed for anxiety.      meclizine (ANTIVERT) 25 mg tablet Take 1 tablet (25 mg total) by mouth 3 (three) times a day as needed for dizziness or nausea. 8/21/2020: Hasn't started yet       Patient Active Problem List   Diagnosis     Anxiety     Esophageal Reflux     Hyperlipidemia     Osteopenia     Vitamin D deficiency     Cigarette nicotine dependence without complication       Objective:  /74 (Patient Site: Left Arm, Patient Position: Sitting, Cuff Size: Adult Regular)   Pulse 82   Temp 97.7  F (36.5  C) (Oral)   Resp 16   Wt 167 lb 8 oz (76 kg)   LMP 03/15/2015   BMI 27.87 kg/m    General: Some discomfort secondary to knee pain  Skill skeletal: Patient does have pain to palpation of the lateral joint line of the right knee.  Positive Terrence's with testing lateral meniscus, ACL PCL MCL LCL tested and strong, no pop with teal cyst palpated, she does have a about a 5 cm mass or swelling over the lateral knee

## 2021-06-10 NOTE — TELEPHONE ENCOUNTER
Yes, meclizine would be good to try. If nausea is persistent we could consider zofran. That would not necessarily help dizziness, however.

## 2021-06-10 NOTE — TELEPHONE ENCOUNTER
Left message to call back for: results  Information to relay to patient:  Left detailed message with Dr Lewis's recommendations

## 2021-06-10 NOTE — PROGRESS NOTES
Rehabilitation Daily Progress     Patient Name: Max RAYMOND Route  Date: 2020  Visit #: 3  Referral Diagnosis: BPPV  Referring provider: Zaina Lewis MD  Visit Diagnosis:     ICD-10-CM    1. Benign paroxysmal positional vertigo, right  H81.11    2. Dizziness  R42    3. Unsteadiness on feet  R26.81    4. Decreased activities of daily living (ADL)  Z78.9          Assessment:     Patient is appropriate to continue with skilled occupational therapy intervention, as indicated by initial plan of care. Patient continues to feel better in regard to her dizziness. She continues to have significant difficulty with balance.    Goal Status:  Patient will be independent with home exercise program in: 2 weeks  Patient will bend: to dress;without dizziness;without loss of balance;in 12 weeks  Patient able to perform bed mobility: without vertigo;in 2 weeks  Patient will turn head: without dizziness;for conversation;in 12 weeks  Patient will look up / down: without dizziness;for drinking;in 12 weeks      Plan / Patient Education:     Continue with initial plan of care. Progress with home program as tolerated.    Subjective:     Pain Ratin    Subjective progress relative to last visit:  Intensity of dizziness is:  less  Frequency of dizziness is: less  Duration of dizziness is: less  Balance is:  better    Objective:     Positional Tests:  Hallpike Right:  Negative  Hallpike Left:  Negative  Head Roll Right: NT  Head Roll Left:  NT    Treatment Today:   X1 viewing- 10 seconds standing-continue  Normal surface eyes closed-continue  Targets-40 seconds-continue but change to standing  Gait with head motion-discontinue  Crossovers-instructed  Braiding-instructed  TREATMENT MINUTES COMMENTS   Evaluation     Self-care/ Home management     Neuromuscular Re-education 25    Canalith repositioning procedure           Total 25    Blank areas are intentional and mean the treatment did not include these items.          Joan MANCILLA  Gerardo  8/17/2020  1:34 PM

## 2021-06-10 NOTE — TELEPHONE ENCOUNTER
Medication Request  Medication name: Anti-nausea, dizziness  Requested Pharmacy: [facility]  Reason for request: Patient went to therapy for her vertigo, is doing the exercising at home, but still is having quite a time with nausea and the dizziness.  Patient states that she had been on Meclizine in the past and is asking if this would help her with this, while working on the exercises.  Please advise.  When did you use medication last?:  Years ago  Patient offered appointment:  No  Okay to leave a detailed message: no

## 2021-06-10 NOTE — TELEPHONE ENCOUNTER
Patient notified. Wondering if she should still get the MRI done? Has the MRI scheduled for 9/8/20

## 2021-06-10 NOTE — TELEPHONE ENCOUNTER
Central PA team  [phone]  Pool: HE PA MED ([id number])          PA has been initiated.       PA form completed and faxed insurance via Cover My Meds     Key:  [id number]     Medication:  Lorazepam 1mg    Insurance:  [employer]        Response will be received via fax and may take up to 5-10 business days depending on plan

## 2021-06-10 NOTE — PROGRESS NOTES
Rehabilitation Daily Progress     Patient Name: Max Thompson  Date: 8/10/2020  Visit #: 2  Referral Diagnosis: BPPV  Referring provider: Zaina Lewis MD  Visit Diagnosis:     ICD-10-CM    1. Benign paroxysmal positional vertigo, right  H81.11    2. Dizziness  R42    3. Unsteadiness on feet  R26.81    4. Decreased activities of daily living (ADL)  Z78.9          Assessment:     Patient is appropriate to continue with skilled occupational therapy intervention, as indicated by initial plan of care. Patient is feeling better in regard to her dizziness, however, she continues to have significant difficulty with balance.    Goal Status:  Patient will be independent with home exercise program in: 2 weeks  Patient will bend: to dress;without dizziness;without loss of balance;in 12 weeks  Patient able to perform bed mobility: without vertigo;in 2 weeks  Patient will turn head: without dizziness;for conversation;in 12 weeks  Patient will look up / down: without dizziness;for drinking;in 12 weeks      Plan / Patient Education:     Continue with initial plan of care. Progress with home program as tolerated.    Subjective:     Pain Ratin    Subjective progress relative to last visit:  Intensity of dizziness is:  less  Frequency of dizziness is: less  Duration of dizziness is: less  Balance is:  better    Objective:     Positional Tests:  Hallpike Right:  Negative  Hallpike Left:  Negative  Head Roll Right: NT  Head Roll Left:  NT    Treatment Today:   X1 viewing- 10 seconds standing-continue  Normal surface eyes closed-continue  Targets-40 seconds-instructed  Gait with head motion-instructed  TREATMENT MINUTES COMMENTS   Evaluation     Self-care/ Home management     Neuromuscular Re-education 25    Canalith repositioning procedure           Total 25    Blank areas are intentional and mean the treatment did not include these items.          Joan Carrillo  8/10/2020  8:31 AM

## 2021-06-10 NOTE — TELEPHONE ENCOUNTER
----- Message from Zaina Lewis MD sent at 8/27/2020  7:32 PM CDT -----  Please call patient.  Knee x-ray without fracture.  Does show some mild arthritis.

## 2021-06-10 NOTE — TELEPHONE ENCOUNTER
Patient Returning Call  Reason for call:  Calling back on message status  Information relayed to patient:  Patient was informed the message has been forwarded to Dr Deleon.  Patient has additional questions:  Yes  If YES, what are your questions/concerns:  Patient is just concerned as she will be having therapy again on 8/10/20 and would like decision prior to that appointment.  Okay to leave a detailed message?: Yes

## 2021-06-10 NOTE — PATIENT INSTRUCTIONS - HE
Did Vestibiular OT  for vertigo    During home vestibular exercises.     Neurology consult if vertigo persists after antibiotics.   Referral placed.  [facility]   PHONE: [phone]    Knee x-ray today.  Radiology will over read and we will call you.    Ordered right knee MRI, our schedulers did help set this.    Will give prescription for 2 mg of Valium if needed for the MRI.  Ideally 30 minutes before the exam but you could ask the MRI tech if they could put you in leg first then you may not need a sedation.

## 2021-06-10 NOTE — PROGRESS NOTES
Rehabilitation Daily Progress     Patient Name: Max RAYMOND Route  Date: 2020  Visit #: 4  Referral Diagnosis: BPPV  Referring provider: Zaina Lewis MD  Visit Diagnosis:     ICD-10-CM    1. Benign paroxysmal positional vertigo, right  H81.11    2. Dizziness  R42    3. Unsteadiness on feet  R26.81    4. Decreased activities of daily living (ADL)  Z78.9          Assessment:     Patient is appropriate to continue with skilled occupational therapy intervention, as indicated by initial plan of care. Patient continues to feel better in regard to her dizziness. She saw her PCP to discuss symptom persistence and PCP started patient on a different antibiotic(Cefdinir). Patient report nearly complete resolution of symptoms.    Goal Status:  Patient will be independent with home exercise program in: 2 weeks  Patient will bend: to dress;without dizziness;without loss of balance;in 12 weeks  Patient able to perform bed mobility: without vertigo;in 2 weeks  Patient will turn head: without dizziness;for conversation;in 12 weeks  Patient will look up / down: without dizziness;for drinking;in 12 weeks      Plan / Patient Education:     Continue with initial plan of care.     Subjective:     Pain Ratin    Subjective progress relative to last visit:  Intensity of dizziness is:  less  Frequency of dizziness is: less  Duration of dizziness is: less  Balance is:  better    Objective:     Positional Tests:  Hallpike Right:  Negative  Hallpike Left:  Negative  Head Roll Right: NT  Head Roll Left:  NT    Treatment Today: Patient is feeling nearly symptom free and was instructed to taper down HEP over the next 2 weeks. She will continue the braiding and crossovers to improve balance.  X1 viewing- 30 seconds standing-continue  Normal surface eyes closed-continue  Targets-40 seconds-continue  Gait with head motion-discontinue  Crossovers-continue  Braiding-continue  Single leg stance -instructed  TREATMENT MINUTES COMMENTS    Evaluation     Self-care/ Home management     Neuromuscular Re-education 25    Canalith repositioning procedure           Total 25    Blank areas are intentional and mean the treatment did not include these items.          Joan Carrillo  8/26/2020  10:05 AM

## 2021-06-10 NOTE — PROGRESS NOTES
"Max Thompson is a 69 y.o. female who is being evaluated via a billable video visit.      The patient has been notified of following:     \"This video visit will be conducted via a call between you and your physician/provider. We have found that certain health care needs can be provided without the need for an in-person physical exam.  This service lets us provide the care you need with a video conversation.  If a prescription is necessary we can send it directly to your pharmacy.  If lab work is needed we can place an order for that and you can then stop by our lab to have the test done at a later time.    Video visits are billed at different rates depending on your insurance coverage. Please reach out to your insurance provider with any questions.    If during the course of the call the physician/provider feels a video visit is not appropriate, you will not be charged for this service.\"    Patient has given verbal consent to a Video visit? Yes  How would you like to obtain your AVS? AVS Preference: Mail a copy.  If dropped by the video visit, the video invitation should be sent to: Text to cell phone: [phone]  Will anyone else be joining your video visit? No        Video Start Time: 3:00 PM    Additional provider notes:     Assessment/Plan:       1. Acute sinusitis treated with antibiotics in the past 60 days  Discussed risks versus benefits of another treatment course of antibiotics.  Patient felt that she improved temporarily after previous course of antibiotics.  For this reason, prescribed cefdinir twice daily for 10 days.  She will continue her vestibular occupational therapy.  If no improvement after antibiotics, recommended in person visit for further follow-up.    2. Vertigo  Patient felt that this improved temporarily with previous course of antibiotics, so we will see if retreating sinusitis improves this as well.  She will continue occupational therapy.        Subjective:       Max Thompson is a " "69 y.o. female who presents for a discussion of continued ear fullness and vertigo.  Patient was prescribed a azithromycin on 7/23 when she saw Dr. Lewis.  She had a red, bulging eardrum at that time per the note.  She was also prescribed vestibular occupational therapy for vertigo.  She has done 3 sessions of OT.  She states that she feels as though she has \"something in her ear\", it feels \"full of water\".  She denies severe pain in the ear.  She does have some pressure at the back of her head, does not feel that her hearing is diminished.  She has mild nasal drainage, no jaw or tooth pain.  She reports that her balance feels \"off\", she denies room spinning dizziness.  She did have some room spinning initially she states.  She also states that initially she seemed to benefit from OT, now her symptoms seem worse again.  She denies any numbness, weakness or tingling to her face or extremities.  She denies fever.    The following portions of the patient's history were reviewed and updated as appropriate: allergies, current medications, past medical history, past social history and problem list.      Current Outpatient Medications:      atorvastatin (LIPITOR) 10 MG tablet, TAKE 1 TABLET BY MOUTH EVERY DAY, Disp: 90 tablet, Rfl: 3     lansoprazole (PREVACID) 30 MG capsule, TAKE 1 CAPSULE BY MOUTH EVERY DAY, Disp: 90 capsule, Rfl: 3     azithromycin (ZITHROMAX) 250 MG tablet, 2 pills on day 1, then 1 pill a day for 4 days, Disp: 6 tablet, Rfl: 1     buPROPion (WELLBUTRIN SR) 150 MG 12 hr tablet, Take one PO QAM for one week then increase to BID, Disp: 60 tablet, Rfl: 2     LORazepam (ATIVAN) 1 MG tablet, Take 1 tablet (1 mg total) by mouth daily as needed for anxiety., Disp: 30 tablet, Rfl: 0     meclizine (ANTIVERT) 25 mg tablet, Take 1 tablet (25 mg total) by mouth 3 (three) times a day as needed for dizziness or nausea., Disp: 30 tablet, Rfl: 1     olopatadine (PATANOL) 0.1 % ophthalmic solution, Administer 1 drop to " both eyes 2 (two) times a day., Disp: 5 mL, Rfl: 1     phenylephrine-acetaminophen (SUDAFED PE PRESSURE+PAIN) 5-325 mg Tab, Take by mouth., Disp: , Rfl:      polymyxin B-trimethoprim (POLYTRIM) 10,000 unit- 1 mg/mL Drop ophthalmic drops, 2 drops to the affected eye or eyes 4 times a day for 1 week, Disp: 10 mL, Rfl: 0    Review of Systems   A 12 point comprehensive review of systems was negative except as noted.      Objective:      LMP 03/15/2015     General appearance: alert, appears stated age and cooperative  Head: Normocephalic, without obvious abnormality, atraumatic  Eyes: Conjunctivae clear  Ears: No drainage  Lungs: Breathing unlabored, no cough during the visit  Neurologic: Alert and oriented, vague historian              Video-Visit Details    Type of service:  Video Visit    Video End Time (time video stopped): 3:11 PM  Originating Location (pt. Location): Home    Distant Location (provider location):  [facility]/OB     Platform used for Video Visit: [employer]Bonfy[employer]      Chloe Bass MD

## 2021-06-11 NOTE — PROGRESS NOTES
"HPI: This patient is a 69yo F who presents for evaluation of the sinuses and dizziness at the request of Dr. Herrera. The patient reports having received a few rounds of antibiotics for \"sinus infections\", the symptoms of which were headache, ear pressure, facial pressure. She also complains for dizziness, described actually as imbalance. She denied any symptom of vertigo, oscillopsia, and lightheadedness and was asked about these multiple times. She states that she has imbalance and doesn't trust her foot placement. She has neuropathy in her feet and a bad right knee; she frequently puts a hand out to give herself some steadiness. She has been seen by Neurology and diagnosed with migraine and neuropathy.  There have not really been episodes of high fever, localized sinus pain, tooth pain, or pururlent drainage to suggest any sinus pathology. Denies dental grinding/clenching. Sometimes with the headaches, she states she will feel nauseated without any other preceding/simultaneous symptoms.     Past medical history, surgical history, social history, family history, medications, and allergies have been reviewed with the patient and are documented above.    Review of Systems: a 10-system review was performed. Pertinent positives are noted in the HPI and on a separate scanned document in the chart.    PHYSICAL EXAMINATION:  GEN: no acute distress, normocephalic  EYES: extraocular movements are intact, pupils are equal and round. Sclera clear.   EARS: auricles are normally formed. The external auditory canals are clear with minimal to no cerumen. Tympanic membranes are intact bilaterally with no signs of infection, effusion, retractions, or perforations.  NOSE: anterior nares are patent. There are no masses or lesions. The septum is non-obstructing. No percussive tenderness over the maxillary or frontal sinuses.   OC/OP: clear, dentition is in fair repair but with wear that is consistent with clenching/grinding. The " tongue and palate are fully mobile and symmetric. No masses or lesions.  NECK: soft and supple. No lymphadenopathy or masses. Airway is midline. Significant crepitus in the bilateral TMJs on all movements.  NEURO: CN VII and XII symmetric. alert and oriented. No spontaneous nystagmus.   PULM: breathing comfortably on room air, normal chest expansion with respiration  CARDS: no cyanosis or clubbing. Normal carotid pulses.  VESTIB: cannot stand on one foot for more than 3 seconds, falls to the side after 4 tandem steps, wobbles considerably standing with feet together and eyes closed.    AUDIO: mild HFSNHL bilaterally. Type A tymps. Symmetric WRS.    MEDICAL DECISION-MAKING: This patient is a 69yo F with neuropathy and postural imbalance. She states multiple times in our encounter that she has not had any vertigo and that her issue is the unsteadiness on her feet. She has been to therapy and should continue this. She has no otologic source for her imbalance. She also has no symptoms or signs consistent with ear or sinus infections, and the symptoms she described earlier this year that were diagnosed as sinus infection are more consistent with viral URI or allergy. Her headache symptoms are consistent with either migraine, as diagnosed by her Neurologist, or TMJ arthritis as found on her exam.

## 2021-06-12 NOTE — TELEPHONE ENCOUNTER
----- Message from Zaina Lewis MD sent at 10/6/2020  6:28 PM CDT -----  Please call patient.  MRI does show a lateral meniscal tear and arthritis and loose bodies in the joint. Old tear of medial collateral ligament.  I will refer him to [facility].  Number below if he would like to call them to set the appointment.  [facility]  PHONE: [phone]

## 2021-06-12 NOTE — PROGRESS NOTES
Discharge Summary  Patient Name: Max RAYMOND Route  Date: 1/15/2021  Referral Diagnosis: BPPV  Referring provider: Pierre Herrera Rai, MD  Visit Diagnosis:   1. Benign paroxysmal positional vertigo, right     2. Dizziness     3. Unsteadiness on feet     4. Decreased activities of daily living (ADL)         Goal status: goals met    Patient was seen for 5 visits between 8-3-20 and 10-28-20.    Therapy will be discontinued at this time.  The patient will need a new referral to resume.    Thank you for your referral.  Joan Carrillo  1/15/2021   4:45 PM     Rehabilitation Daily Progress     Patient Name: Max RAYMOND Route  Date: 10/28/2020  Visit #: 5  Referral Diagnosis: BPPV  Referring provider: Pierre Herrera Rai, MD  Visit Diagnosis:     ICD-10-CM    1. Benign paroxysmal positional vertigo, right  H81.11    2. Dizziness  R42    3. Unsteadiness on feet  R26.81    4. Decreased activities of daily living (ADL)  Z78.9          Assessment:     Patient reports that she was feeling good after last visit and then symptoms returned about mid-September. She had stopped her exercises and was re-instructed on these today. She states the current symptoms include fullness in her head and disequilibrium. She saw ENT and had no otologic issues. She also saw neurology and was told she may have neuropathy in her feet and active migraines. She has a new medicine for migraines and will be having a test for the neuropathy on Monday.     Patient reports that her  is ill and she is the primary caregiver.  She is trying to get to her own appointments and take care of her own health with no help. Recommended she contact her PCP and ask for resources to get respite care as needed.    Goal Status:  Patient will be independent with home exercise program in: 2 weeks  Patient will bend: to dress;without dizziness;without loss of balance;in 12 weeks  Patient able to perform bed mobility: without vertigo;in 2 weeks  Patient will turn  head: without dizziness;for conversation;in 12 weeks  Patient will look up / down: without dizziness;for drinking;in 12 weeks      Plan / Patient Education:     Patient re-instructed on her previous HEP. She will do this for 3 weeks and see if symptoms improve. If they don't improve, she will discontinue and if symptoms resolve, she will continue them on a maintenance level of once a day. Re-assess if patient returns.      Subjective:     Pain Ratin    Subjective progress relative to last visit:  Intensity of dizziness is:  less  Frequency of dizziness is: less  Duration of dizziness is: less  Balance is:  better    Objective:     Positional Tests:  Hallpike Right:  Negative  Hallpike Left:  Negative  Head Roll Right: NT  Head Roll Left:  NT    Treatment Today: Patient's symptoms have increased and she was re-instructed on her previous HEP. She will do this for 3 weeks and see if symptoms improve. If they don't improve, she will discontinue and if symptoms resolve, she will continue them on a maintenance level of once a day.  X1 viewing- 30 seconds standing-re-instructed  Normal surface eyes [id number]  Targets-40 [id number]  Gait with head motion-re-instructed  Crossovers-continue  Braiding-continue  Single leg stance -continue  TREATMENT MINUTES COMMENTS   Evaluation     Self-care/ Home management     Neuromuscular Re-education 30    Canalith repositioning procedure           Total 30    Blank areas are intentional and mean the treatment did not include these items.          Joan Carrillo  10/28/2020  11:05 AM

## 2021-06-12 NOTE — PROGRESS NOTES
SUBJECTIVE:  Max Thompson is a 66 y.o. female  who complains of urinary frequency, urgency and dysuria x 3 days, without flank pain, fever, chills, or abnormal vaginal discharge or bleeding. She has not had a bladder infection. Normal BM's.    OBJECTIVE: Appears in no distress.  Fever is absent. The abdomen is soft with some suprapubic tenderness always across to both ASIS. No CVA tenderness noted.     Results for orders placed or performed in visit on 07/20/17   Urinalysis-UC if Indicated   Result Value Ref Range    Color, UA Yellow Colorless, Yellow, Straw, Light Yellow    Clarity, UA Clear Clear    Glucose, UA Negative Negative    Bilirubin, UA Negative Negative    Ketones, UA Negative Negative    Specific Gravity, UA 1.010 1.005 - 1.030    Blood, UA Trace (!) Negative    pH, UA 6.5 5.0 - 8.0    Protein, UA Negative Negative mg/dL    Urobilinogen, UA 0.2 E.U./dL 0.2 E.U./dL, 1.0 E.U./dL    Nitrite, UA Negative Negative    Leukocytes, UA Negative Negative    Bacteria, UA None Seen None Seen hpf    RBC, UA 0-2 None Seen, 0-2 hpf    WBC, UA 0-5 None Seen, 0-5 hpf    Squam Epithel, UA 10-25 (!) None Seen, 0-5 lpf         ASSESSMENT: Equivocal urine.  Will treat based on symptoms of increased frequency and burning with urination.  I also discussed the possibility that constipation or incomplete emptying of the bladder may cause some of her symptoms.  Furthermore pelvic masses may also cause a sensation of fullness.  Because this is been only going on for 3 days I think it is reasonable to try a course of antibiotics to see if symptoms will resolve.    PLAN:       Patient Instructions     Borderline infected urine. Will treat based on your symptoms.    Start the antibiotic for 7 days    You should start feeling better in 3-5 days.    Follow up if the sx do not resolve with the antibiotic treatment.         Medications Ordered   Medications     cephalexin (KEFLEX) 500 MG capsule     Sig: Take 1 capsule (500 mg total)  by mouth 2 (two) times a day for 7 days.     Dispense:  14 capsule     Refill:  0

## 2021-06-12 NOTE — PATIENT INSTRUCTIONS - HE
Start lexapro and take daily  You can start 1/2 of the 10 mg dose for a few days then take a full pill (10 mg)  Please provide an update in 2-4 weeks

## 2021-06-12 NOTE — TELEPHONE ENCOUNTER
Patient believes she is having depression issues; since July of this year.  Transferred to scheduling for office visit within 3 days.    Michelle Martinez RN  [facility] Triage Nurse Advisor    Reason for Disposition    Symptoms interfere with work or school    Additional Information    Negative: Patient attempted suicide    Negative: Patient is threatening suicide now    Negative: Violent behavior, or threatening to physically hurt or kill someone    Negative: Patient is very confused (disoriented, slurred speech) and no other adult (e.g., friend or family member) available    Negative: Difficult to awaken or acting very confused (disoriented, slurred speech) and new onset    Negative: Sounds like a life-threatening emergency to the triager    Negative: Alcohol use, abuse or dependence, question or problem related to    Negative: Drug abuse or dependence, question or problem related to    Negative: Suicide thoughts, threats, attempts, or questions    Negative: Anxiety is main problem or symptom    Negative: Depression and unable to do any of normal activities (e.g., self care, school, work; in comparison to baseline).    Negative: Very strange or confused behavior    Negative: Patient sounds very sick or weak to the triager    Negative: Fever > 101 F (38.3 C)    Negative: Sometimes has thoughts of suicide    Protocols used: DEPRESSION-A-OH

## 2021-06-12 NOTE — TELEPHONE ENCOUNTER
OK to put her on my Thursday morning schedule (Odalys was going to create some open slots). Thanks.

## 2021-06-12 NOTE — TELEPHONE ENCOUNTER
New Appointment Needed  What is the reason for the visit:    Same Date/Next Day Appt Request  What is the reason for your visit?:             Depression , Triaged   Provider Preference: PCP only  How soon do you need to beDepression , Triaged  seen?: within next 2 days   Waitlist offered?: No  Okay to leave a detailed message:  Yes

## 2021-06-13 NOTE — TELEPHONE ENCOUNTER
RN cannot approve Refill Request    RN can NOT refill this medication Should have 2  refills on file.. Last office visit: 11/5/2020 Michele Deleon MD Last Physical: Visit date not found Last MTM visit: Visit date not found Last visit same specialty: 11/5/2020 Michele Deleon MD.  Next visit within 3 mo: Visit date not found  Next physical within 3 mo: Visit date not found      Luz Howard, [facility] Triage/Med Refill 11/28/2020    Requested Prescriptions   Pending Prescriptions Disp Refills     escitalopram oxalate (LEXAPRO) 10 MG tablet [Pharmacy Med Name: ESCITALOPRAM 10 MG TABLET] 30 tablet 2     Sig: TAKE 1 TABLET BY MOUTH EVERY DAY       [facility] Refill Protocol  Passed - 11/27/2020  9:32 AM        Passed - PCP or prescribing provider visit in last year     Last office visit with prescriber/PCP: 11/5/2020 Michele Deleon MD OR same dept: 11/5/2020 Michele Deleon MD OR same specialty: 11/5/2020 Michele Deleon MD  Last physical: Visit date not found Last MTM visit: Visit date not found   Next visit within 3 mo: Visit date not found  Next physical within 3 mo: Visit date not found  Prescriber OR PCP: Michele Deleon MD  Last diagnosis associated with med order: 1. Moderate major depression (H)  - escitalopram oxalate (LEXAPRO) 10 MG tablet [Pharmacy Med Name: ESCITALOPRAM 10 MG TABLET]; TAKE 1 TABLET BY MOUTH EVERY DAY  Dispense: 30 tablet; Refill: 2    2. Anxiety  - escitalopram oxalate (LEXAPRO) 10 MG tablet [Pharmacy Med Name: ESCITALOPRAM 10 MG TABLET]; TAKE 1 TABLET BY MOUTH EVERY DAY  Dispense: 30 tablet; Refill: 2    If protocol passes may refill for 12 months if within 3 months of last provider visit (or a total of 15 months).

## 2021-06-13 NOTE — PROGRESS NOTES
Assessment/ Plan     1. Moderate major depression (H)  2. Anxiety    PHQ-9 score is 20  Reviewed signs and symptoms of depression as well as anxiety  Reviewed treatment options recommend considering follow-up with a counselor  Reviewed treatment options.  Her preference is to take a medication  Start Lexapro and take 10 mg daily  Review potential side effects and time to reach therapeutic effect  Recommend following up in 2-4 weeks  Consider further adjustments as needed  - escitalopram oxalate (LEXAPRO) 10 MG tablet; Take 1 tablet (10 mg total) by mouth daily.  Dispense: 30 tablet; Refill: 2     3.  Elevated blood pressure without diagnosis of depression    Recommend follow-up to recheck blood pressure  Recommend working on dietary and lifestyle changes      Subjective:       Max Thompson is a 70 y.o. female who presents to the clinic as she has been experiencing signs and symptoms of depression recently.  Her  Eusebio has been quite ill and has required multiple hospitalizations recently for shortness of breath, chronic obstructive pulmonary disease, heart failure, and polycythemia vera.  This has been very stressful for her she has been worried about him.  She notes that she has been feeling depressed more than half the days.  She has had little interest or pleasure in doing things nearly every day.  Her energy level has been low.  She has had difficulty concentrating and feels badly about herself.  She denies any thoughts of self-harm.  He said is been fairly severe and she would like to consider treatment for this.    Her medical history is otherwise notable for hyperlipidemia and osteopenia.  She continues to take atorvastatin on a consistent basis.  For reflux symptoms she takes lansoprazole which has been helpful.  She will take rizatriptan for migraine headaches which generally have been stable.    Interim history is over the fact that she did have symptoms of dizziness while walking in July that  "were evaluated.  She more recently had a cellulitis requiring treatment.  She also has followed up with ENT specialist regarding vertigo.  Her symptoms have improved over time.  Also, she has had cortisone injections for her right knee.  An MRI was abnormal and revealed a lateral meniscus tear and degenerative changes.      The following portions of the patient's history were reviewed and updated as appropriate: allergies, current medications, past family history, past medical history, past social history, past surgical history and problem list. Medications have been reconciled    Review of Systems   A 12 point comprehensive review of systems was negative except as noted.      Current Outpatient Medications   Medication Sig Dispense Refill     atorvastatin (LIPITOR) 10 MG tablet TAKE 1 TABLET BY MOUTH EVERY DAY 90 tablet 3     lansoprazole (PREVACID) 30 MG capsule TAKE 1 CAPSULE BY MOUTH EVERY DAY 90 capsule 3     LORazepam (ATIVAN) 1 MG tablet Take 1 tablet (1 mg total) by mouth daily as needed for anxiety. 30 tablet 0     rizatriptan (MAXALT) 10 MG tablet TAKE 0.5 TABLETS (5 MG) BY MOUTH AT ONSET OF HEADACHE FOR MIGRAINE MAY REPEAT IN 2 HOURS.       escitalopram oxalate (LEXAPRO) 10 MG tablet Take 1 tablet (10 mg total) by mouth daily. 30 tablet 2     No current facility-administered medications for this visit.        Objective:      /78   Pulse 77   Resp 16   Ht 5' 5\" (1.651 m)   Wt 171 lb 6.4 oz (77.7 kg)   LMP 03/15/2015   BMI 28.52 kg/m      General appearance: alert, appears stated age   Head: normocephalic, without obvious abnormality, atraumatic  Eyes: conjunctivae/corneas clear. PERRL, EOM's intact.   Neurologic: Alert and oriented X 3           No results found for this or any previous visit (from the past 168 hour(s)).       This note has been dictated using voice recognition software. Any grammatical or context distortions are unintentional and inherent to the software    Michele Deleon, " MD

## 2021-06-13 NOTE — PROGRESS NOTES
Assessment/ Plan     1. GERD (gastroesophageal reflux disease)    Continue Prevacid  Reviewed potential adverse effects of long-term use  Patient believes she needs to continue this medication  Reviewed dietary lifestyle modifications  - lansoprazole (PREVACID) 30 MG capsule; TAKE ONE CAPSULE BY MOUTH ONCE DAILY  Dispense: 90 capsule; Refill: 3  - Basic Metabolic Panel; Future    2. Anxiety    She has been using lorazepam sparingly  Discussed potential risks including sedation discussed this can be habit-forming as well  Recommend follow-up if having increasing anxiety  - LORazepam (ATIVAN) 1 MG tablet; Take 1 tablet (1 mg total) by mouth daily as needed for anxiety.  Dispense: 30 tablet; Refill: 0    3. Itching    Recommend a trial of Claritin or Zyrtec  She may take Benadryl at night as this is more sedating    4. Hair loss    She will follow-up in the future for laboratory testing  - Thyroid Cascade; Future  - HM2(CBC w/o Differential)  Consider referral to dermatology if there are ongoing concerns    5. Hyperlipidemia    Recommend she continue to work on diet and exercise  - Lipid Cascade; Future  - Hepatic Profile; Future    Healthcare maintenance    Strongly recommend tobacco cessation  Recommend that she consider weaning hormone replacement therapy.  Discussed potential increased risks of thrombotic activity potential increased risk of breast cancer  She is in the process of trying to wean and is taking 0.25 mg daily        Subjective:       Max Thompson is a 67 y.o. female who presents to the clinic for medication check. She has a known history of gastroesophageal reflux disease, anxiety, osteopenia, and hyperlipidemia. She has a history of colon polyps and has had evidence of diverticulosis noted as well.  She continues to take Prevacid which has been helpful for her reflux symptoms.  If she weans that medication then she will have an increase in side effects.  She recently has had some abdominal  "discomfort that is vague and left-sided.  However, she notes that by decreasing yogurt the symptoms seem to improve.  Her most recent total cholesterol was 189 with an LDL of 82.  She notes she did have an itchy rash 2-3 weeks ago.  This was present on her left upper back area.  She states she took Benadryl which has been helpful.  She continues to use tobacco.  Also, she continues to take hormone replacement therapy.  One other concern has been hearing loss.  She has had thinning of her hair which is more noticeable recently.     Also, she has a history of intermittent anxiety and will take Lorazepam as needed for anxiety or for sleep.    The following portions of the patient's history were reviewed and updated as appropriate: allergies, current medications, past family history, past medical history, past social history, past surgical history and problem list.    Review of Systems   A 12 point comprehensive review of systems was negative except as noted.      Current Outpatient Prescriptions   Medication Sig Dispense Refill     atorvastatin (LIPITOR) 10 MG tablet Take 1 tablet (10 mg total) by mouth daily. 90 tablet 3     estradiol (ESTRACE) 1 MG tablet Take 0.25 mg by mouth daily.        lansoprazole (PREVACID) 30 MG capsule TAKE ONE CAPSULE BY MOUTH ONCE DAILY 90 capsule 3     LORazepam (ATIVAN) 1 MG tablet Take 1 tablet (1 mg total) by mouth daily as needed for anxiety. 30 tablet 0     medroxyPROGESTERone (PROVERA) 10 MG tablet        No current facility-administered medications for this visit.        Objective:      /72  Pulse 80  Temp 98.2  F (36.8  C) (Oral)   Resp 16  Ht 5' 5\" (1.651 m)  Wt 167 lb (75.8 kg)  LMP 03/15/2015  BMI 27.79 kg/m2      General appearance: alert, appears stated age and cooperative  Head: Normocephalic, without obvious abnormality, atraumatic  Eyes: conjunctivae/corneas clear. PERRL, EOM's intact.  Ears: normal TM's and external ear canals both ears  Nose: Nares normal. " Septum midline. Mucosa normal. No drainage or sinus tenderness.  Throat: lips, mucosa, and tongue normal; teeth and gums normal  Neck: no adenopathy, supple, symmetrical, trachea midline and thyroid not enlarged, symmetric, no tenderness/mass/nodules  Lungs: clear to auscultation bilaterally  Heart: regular rate and rhythm, S1, S2 normal, no murmur, click, rub or gallop  Abdomen: soft, non-tender; bowel sounds normal; no masses,  no organomegaly  Extremities: extremities normal, atraumatic, no cyanosis or edema  Pulses: 2+ and symmetric  Skin: Skin color, texture, turgor normal. No rashes or lesions  Neurologic: Alert and oriented X 3. Normal coordination and gait         Recent Results (from the past 168 hour(s))   HM2(CBC w/o Differential)   Result Value Ref Range    WBC 5.0 4.0 - 11.0 thou/uL    RBC 4.37 3.80 - 5.40 mill/uL    Hemoglobin 13.7 12.0 - 16.0 g/dL    Hematocrit 41.1 35.0 - 47.0 %    MCV 94 80 - 100 fL    MCH 31.4 27.0 - 34.0 pg    MCHC 33.4 32.0 - 36.0 g/dL    RDW 11.4 11.0 - 14.5 %    Platelets 303 140 - 440 thou/uL    MPV 7.7 7.0 - 10.0 fL   Lipid Cascade   Result Value Ref Range    Cholesterol 179 <=199 mg/dL    Triglycerides 180 (H) <=149 mg/dL    HDL Cholesterol 56 >=50 mg/dL    LDL Calculated 87 <=129 mg/dL    Patient Fasting > 8hrs? Yes    Basic Metabolic Panel   Result Value Ref Range    Sodium 142 136 - 145 mmol/L    Potassium 4.4 3.5 - 5.0 mmol/L    Chloride 110 (H) 98 - 107 mmol/L    CO2 24 22 - 31 mmol/L    Anion Gap, Calculation 8 5 - 18 mmol/L    Glucose 99 70 - 125 mg/dL    Calcium 9.5 8.5 - 10.5 mg/dL    BUN 14 8 - 22 mg/dL    Creatinine 0.73 0.60 - 1.10 mg/dL    GFR MDRD Af Amer >60 >60 mL/min/1.73m2    GFR MDRD Non Af Amer >60 >60 mL/min/1.73m2   Hepatic Profile   Result Value Ref Range    Bilirubin, Total 0.6 0.0 - 1.0 mg/dL    Bilirubin, Direct 0.2 <=0.5 mg/dL    Protein, Total 6.6 6.0 - 8.0 g/dL    Albumin 3.5 3.5 - 5.0 g/dL    Alkaline Phosphatase 79 45 - 120 U/L    AST 15 0 - 40  U/L    ALT 16 0 - 45 U/L   Thyroid Cascade   Result Value Ref Range    TSH 1.86 0.30 - 5.00 uIU/mL          This note has been dictated using voice recognition software. Any grammatical or context distortions are unintentional and inherent to the software

## 2021-06-16 PROBLEM — F17.210 CIGARETTE NICOTINE DEPENDENCE WITHOUT COMPLICATION: Status: ACTIVE | Noted: 2019-01-21

## 2021-06-16 PROBLEM — F32.1 MODERATE MAJOR DEPRESSION (H): Status: ACTIVE | Noted: 2021-05-20

## 2021-06-16 PROBLEM — F41.9 ANXIETY: Status: ACTIVE | Noted: 2020-11-05

## 2021-06-16 NOTE — TELEPHONE ENCOUNTER
Refill Approved    Rx renewed per Medication Renewal Policy. Medication was last renewed on 3/2/20.    Minesh Herrera, [facility] Triage/Med Refill 3/30/2021     Requested Prescriptions   Pending Prescriptions Disp Refills     atorvastatin (LIPITOR) 10 MG tablet [Pharmacy Med Name: ATORVASTATIN 10 MG TABLET] 90 tablet 2     Sig: TAKE 1 TABLET BY MOUTH EVERY DAY       Statins Refill Protocol (Hmg CoA Reductase Inhibitors) Passed - 3/29/2021 11:38 AM        Passed - PCP or prescribing provider visit in past 12 months      Last office visit with prescriber/PCP: 11/5/2020 Michele Deleon MD OR same dept: 11/5/2020 Michele Deleon MD OR same specialty: 11/5/2020 Michele Deleon MD  Last physical: Visit date not found Last MTM visit: Visit date not found   Next visit within 3 mo: Visit date not found  Next physical within 3 mo: Visit date not found  Prescriber OR PCP: Michele Deleon MD  Last diagnosis associated with med order: 1. Hyperlipidemia, unspecified hyperlipidemia type  - atorvastatin (LIPITOR) 10 MG tablet [Pharmacy Med Name: ATORVASTATIN 10 MG TABLET]; TAKE 1 TABLET BY MOUTH EVERY DAY  Dispense: 90 tablet; Refill: 2    2. GERD (gastroesophageal reflux disease)  - lansoprazole (PREVACID) 30 MG capsule [Pharmacy Med Name: LANSOPRAZOLE DR 30 MG CAPSULE]; TAKE 1 CAPSULE BY MOUTH EVERY DAY  Dispense: 90 capsule; Refill: 2    If protocol passes may refill for 12 months if within 3 months of last provider visit (or a total of 15 months).                lansoprazole (PREVACID) 30 MG capsule [Pharmacy Med Name: LANSOPRAZOLE DR 30 MG CAPSULE] 90 capsule 2     Sig: TAKE 1 CAPSULE BY MOUTH EVERY DAY       GI Medications Refill Protocol Passed - 3/29/2021 11:38 AM        Passed - PCP or prescribing provider visit in last 12 or next 3 months.     Last office visit with prescriber/PCP: 11/5/2020 Michele Deleon MD OR same dept: 11/5/2020 Michele Deleon MD OR same  specialty: 11/5/2020 Michele Deleon MD  Last physical: Visit date not found Last MTM visit: Visit date not found   Next visit within 3 mo: Visit date not found  Next physical within 3 mo: Visit date not found  Prescriber OR PCP: Michele Deleon MD  Last diagnosis associated with med order: 1. Hyperlipidemia, unspecified hyperlipidemia type  - atorvastatin (LIPITOR) 10 MG tablet [Pharmacy Med Name: ATORVASTATIN 10 MG TABLET]; TAKE 1 TABLET BY MOUTH EVERY DAY  Dispense: 90 tablet; Refill: 2    2. GERD (gastroesophageal reflux disease)  - lansoprazole (PREVACID) 30 MG capsule [Pharmacy Med Name: LANSOPRAZOLE DR 30 MG CAPSULE]; TAKE 1 CAPSULE BY MOUTH EVERY DAY  Dispense: 90 capsule; Refill: 2    If protocol passes may refill for 12 months if within 3 months of last provider visit (or a total of 15 months).

## 2021-06-17 NOTE — PATIENT INSTRUCTIONS - HE
Patient Instructions by Noemí Frey MD at 6/14/2019  9:30 AM     Author: Noemí Frey MD Service: -- Author Type: Physician    Filed: 6/14/2019  9:50 AM Encounter Date: 6/14/2019 Status: Signed    : Noemí Frey MD (Physician)         Patient Education     Sinusitis (No Antibiotics)    The sinuses are air-filled spaces within the bones of the face. They connect to the inside of the nose. Sinusitis is an inflammation of the tissue that lines the sinuses. Sinusitis can occur during a cold. It can also happen due to allergies to pollens and other particles in the air. It can cause symptoms such as sinus congestion, headache, sore throat, facial swelling, and a feeling of fullness. It may also cause a low-grade fever. Your sinusitis does not include an infection with bacteria. Because of this, antibiotics are not used to treat this problem.  Home care    Drink plenty of water, hot tea, and other liquids. This may help thin nasal mucus. It also may help your sinuses drain fluids.    Heat may help soothe painful areas of your face. Use a towel soaked in hot water. Or,  the shower and direct the warm spray onto your face. Using a vaporizer along with a menthol rub at night may also help soothe symptoms.     An expectorant with guaifenesin may help thin nasal mucus and help your sinuses drain fluids.    You can use an over-the-counter decongestant, unless a similar medicine was prescribed to you. Nasal sprays work the fastest. Use one that contains phenylephrine or oxymetazoline. First blow your nose gently. Then use the spray. Do not use these medicines more often than directed on the label. If you do, your symptoms may get worse. You may also take pills that contain pseudoephedrine. Dont use products that combine multiple medicines. This is because side effects may be increased. Read all medicine labels. You can also ask the pharmacist for help. (People with high blood pressure should  not use decongestants. They can raise blood pressure.)    Over-the-counter antihistamines may help if allergies contributed to your sinusitis.      Use acetaminophen or ibuprofen to control pain, unless another pain medicine was prescribed to you. If you have chronic liver or kidney disease or ever had a stomach ulcer, talk with your healthcare provider before using these medicines. (Aspirin should never be taken by anyone under age 18 who is ill with a fever. It may cause severe liver damage.)    Use nasal rinses or irrigation as instructed by your healthcare provider.    Don't smoke. This can make symptoms worse.  Follow-up care  Follow up with your healthcare provider or our staff if you are better in 1 week.  When to seek medical advice  Call your healthcare provider if any of these occur:    Green or yellow fluid draining from your nose or into your throat    Facial pain or headache that gets worse    Stiff neck    Unusual drowsiness or confusion    Swelling of your forehead or eyelids    Vision problems, such as blurred or double vision    Fever of 100.4 F (38 C) or higher, or as directed by your healthcare provider    Seizure    Breathing problems    Symptoms that don't go away in 10 days  Date Last Reviewed: 11/1/2017 2000-2017 [employer]. [address] [place] [address], [place] [address]. All rights reserved. This information is not intended as a substitute for professional medical care. Always follow your healthcare professional's instructions.

## 2021-06-17 NOTE — TELEPHONE ENCOUNTER
Refill Approved    Rx renewed per Medication Renewal Policy. Medication was last renewed on 4/28/21, 4/28/21.    Lu Posadas, [facility] Triage/Med Refill 5/23/2021     Requested Prescriptions   Pending Prescriptions Disp Refills     buPROPion (WELLBUTRIN SR) 150 MG 12 hr tablet [Pharmacy Med Name: BUPROPION HCL  MG TABLET] 60 tablet 2     Sig: TAKE ONE TABLET BY MOUTH EVERY MORNING FOR ONE WEEK THEN INCREASE TO TWICE A DAY       Tricyclics/Misc Antidepressant/Antianxiety Meds Refill Protocol Passed - 5/21/2021  8:30 AM        Passed - PCP or prescribing provider visit in last year     Last office visit with prescriber/PCP: 11/5/2020 Michele Deleon MD OR same dept: 11/5/2020 Michele Deleon MD OR same specialty: 11/5/2020 Michele Deleon MD  Last physical: 4/28/2021 Last MTM visit: Visit date not found   Next visit within 3 mo: Visit date not found  Next physical within 3 mo: Visit date not found  Prescriber OR PCP: Michele Deleon MD  Last diagnosis associated with med order: 1. Cigarette nicotine dependence without complication  - buPROPion (WELLBUTRIN SR) 150 MG 12 hr tablet [Pharmacy Med Name: BUPROPION HCL  MG TABLET]; TAKE ONE TABLET BY MOUTH EVERY MORNING FOR ONE WEEK THEN INCREASE TO TWICE A DAY  Dispense: 60 tablet; Refill: 2    If protocol passes may refill for 12 months if within 3 months of last provider visit (or a total of 15 months).

## 2021-06-17 NOTE — TELEPHONE ENCOUNTER
Telephone Encounter by Ciera Dean at 7/29/2020 10:13 AM     Author: Ciera Dean Service: -- Author Type: --    Filed: 7/29/2020 10:14 AM Encounter Date: 7/25/2020 Status: Signed    : Ciera Dean APPROVED:    Approval start date: 7/28/2020  Approval end date:  7/28/2021    Pharmacy has been notified of approval and will contact patient when medication is ready for pickup.    Will copy approval

## 2021-06-17 NOTE — PROGRESS NOTES
Assessment and Plan:     Patient has been advised of split billing requirements and indicates understanding: Yes  1. Medicare annual wellness visit, subsequent    Reviewed the annual wellness visit health risk assessment form  Mini cog score is 5/5  Reviewed falls risk  PHQ-2 score 0    Refer for a mammogram  - Mammo Screening Bilateral; Future    Recommend considering a tetanus booster    Her colonoscopy is up-to-date from 2020.  She is due in 2025.    2. Encounter for screening mammogram for malignant neoplasm of breast     Refer for mammogram  - Mammo Screening Bilateral; Future    3. Hyperlipidemia, unspecified hyperlipidemia type    Continue atorvastatin  Check laboratory testing  - atorvastatin (LIPITOR) 10 MG tablet; Take 1 tablet (10 mg total) by mouth daily.  Dispense: 90 tablet; Refill: 3  - Basic Metabolic Panel; Future  - Lipid Cascade; Future  - Hepatic Profile; Future  - HM2(CBC w/o Differential); Future    4. Anxiety    Refilled lorazepam for anxiety  She understands that this will be taken on a very limited basis  Review potential risk clinic sedation and increased risk of falls  - LORazepam (ATIVAN) 1 MG tablet; Take 1 tablet (1 mg total) by mouth daily as needed for anxiety.  Dispense: 30 tablet; Refill: 0    5. GERD (gastroesophageal reflux disease)    Continue Prevacid  - lansoprazole (PREVACID) 30 MG capsule; Take 1 capsule (30 mg total) by mouth daily.  Dispense: 90 capsule; Refill: 3    6. Cigarette nicotine dependence without complication  Reviewed tobacco cessation  Patient will resume Wellbutrin    - buPROPion (WELLBUTRIN SR) 150 MG 12 hr tablet; Take one PO QAM for one week then increase to BID  Dispense: 60 tablet; Refill: 2    7. Vitamin D deficiency    Check a vitamin D level  Recommend vitamin D supplementation  - Vitamin D, Total (25-Hydroxy); Future    8. Moderate major depression (H)  Has improved significantly   PHQ-9 score is 3 which represents an improvement from 20    She will  be taking Wellbutrin given a history of nicotine dependence.      The patient's current medical problems were reviewed.    I have had an Advance Directives discussion with the patient.  The following health maintenance schedule was reviewed with the patient and provided in printed form in the after visit summary:   Health Maintenance   Topic Date Due     DEPRESSION ACTION PLAN  Never done     ADVANCE CARE PLANNING  Never done     ZOSTER VACCINES (1 of 2) Never done     TD 18+ HE  01/12/2019     INFLUENZA VACCINE RULE BASED (1) 08/01/2020     MAMMOGRAM  09/13/2021     MEDICARE ANNUAL WELLNESS VISIT  04/28/2022     FALL RISK ASSESSMENT  04/28/2022     LIPID  01/21/2024     COLORECTAL CANCER SCREENING  02/05/2025     DEXA SCAN  06/04/2029     Pneumococcal Vaccine: 65+ Years  Completed     COVID-19 Vaccine  Completed     Pneumococcal Vaccine: Pediatrics (0 to 5 Years) and At-Risk Patients (6 to 64 Years)  Aged Out     HEPATITIS B VACCINES  Aged Out     HEPATITIS C SCREENING  Discontinued       Subjective:   Chief Complaint: Max Thompson is an 70 y.o. female here for an Annual Wellness visit.   HPI: This is a pleasant 70-year-old female who presents to the clinic for annual wellness visit.    Her medical history is notable for hyperlipidemia, gastroesophageal reflux disease, and nicotine dependence.    She continues to take atorvastatin 10 mg daily.  She also takes lansoprazole 30 mg daily.  Her symptoms have generally been well controlled.    She does have a history of right knee arthritis and has followed up with orthopedics and has had knee injections.    At the last visit we discussed stressors contributing to depression symptoms.  Her  Eusebio has required multiple hospitalizations for shortness of breath and chronic obstructive pulmonary disease as well as heart failure.  This has created anxiety and worry.  She does note that there has been significant improvement since the last check.    She does continue  to use tobacco and feels motivated to quit.  She would like to take Wellbutrin for that.    She states that she otherwise has been doing well.  She has approximately 3 alcoholic beverages per week.    She did have an evaluation for dizziness which may have been secondary to benign positional vertigo.  She had an extensive neurological evaluation.  She did have follow-up including an electromyelogram.    Review of Systems:   Please see above.  The rest of the review of systems are negative for all systems.    Patient Care Team:  Michele Deleon MD as PCP - General  Yeimi Sepulveda MD (Inactive) as PCP - OBGYN (Obstetrics and Gynecology)  Zaina Lewis MD as Assigned PCP  Trish Coats MD as Assigned Surgical Provider     Patient Active Problem List   Diagnosis     Anxiety     Esophageal Reflux     Hyperlipidemia     Osteopenia     Vitamin D deficiency     Cigarette nicotine dependence without complication     Anxiety     Past Medical History:   Diagnosis Date     Breast cyst 2013      No past surgical history on file.   No family history on file.   Social History     Socioeconomic History     Marital status:      Spouse name: Not on file     Number of children: Not on file     Years of education: Not on file     Highest education level: Not on file   Occupational History     Not on file   Social Needs     Financial resource strain: Not on file     Food insecurity     Worry: Not on file     Inability: Not on file     Transportation needs     Medical: Not on file     Non-medical: Not on file   Tobacco Use     Smoking status: Former Smoker     Packs/day: 0.50     Smokeless tobacco: Never Used     Tobacco comment: quit 5/2020   Substance and Sexual Activity     Alcohol use: Not on file     Drug use: Not on file     Sexual activity: Not on file   Lifestyle     Physical activity     Days per week: Not on file     Minutes per session: Not on file     Stress: Not on file  "  Relationships     Social connections     Talks on phone: Not on file     Gets together: Not on file     Attends Jainism service: Not on file     Active member of club or organization: Not on file     Attends meetings of clubs or organizations: Not on file     Relationship status: Not on file     Intimate partner violence     Fear of current or ex partner: Not on file     Emotionally abused: Not on file     Physically abused: Not on file     Forced sexual activity: Not on file   Other Topics Concern     Not on file   Social History Narrative     Not on file      Current Outpatient Medications   Medication Sig Dispense Refill     atorvastatin (LIPITOR) 10 MG tablet Take 1 tablet (10 mg total) by mouth daily. 90 tablet 3     lansoprazole (PREVACID) 30 MG capsule Take 1 capsule (30 mg total) by mouth daily. 90 capsule 3     LORazepam (ATIVAN) 1 MG tablet Take 1 tablet (1 mg total) by mouth daily as needed for anxiety. 30 tablet 0     buPROPion (WELLBUTRIN SR) 150 MG 12 hr tablet Take one PO QAM for one week then increase to BID 60 tablet 2     rizatriptan (MAXALT) 10 MG tablet TAKE 0.5 TABLETS (5 MG) BY MOUTH AT ONSET OF HEADACHE FOR MIGRAINE MAY REPEAT IN 2 HOURS.       No current facility-administered medications for this visit.       Objective:   Vital Signs:   Visit Vitals  /78 (Patient Site: Left Arm, Patient Position: Sitting, Cuff Size: Adult Regular)   Pulse 86   Temp 98.1  F (36.7  C) (Oral)   Resp 16   Ht 5' 5\" (1.651 m)   Wt 172 lb 9.6 oz (78.3 kg)   LMP 03/15/2015   BMI 28.72 kg/m           VisionScreening:  No exam data present     PHYSICAL EXAM  General appearance: alert, appears stated age and cooperative  Head: Normocephalic, without obvious abnormality, atraumatic  Eyes: conjunctivae/corneas clear. PERRL, EOM's intact.   Ears: normal TM's and external ear canals both ears  Nose: Nares normal. Septum midline. Mucosa normal.  Throat: lips, mucosa, and tongue normal; teeth and gums normal  Neck: " no adenopathy, supple, symmetrical, trachea midline   Lungs: clear to auscultation bilaterally  Heart: regular rate and rhythm, S1, S2 normal, no murmur, click, rub or gallop  Extremities: extremities normal, atraumatic, no cyanosis or edema  Skin: Skin color, texture, turgor normal.  Lymph nodes: Cervical nodes normal.  Neurologic: Alert and oriented X 3.      Assessment Results 4/28/2021   Activities of Daily Living No help needed   Instrumental Activities of Daily Living No help needed   Mini Cog Total Score 5   Some recent data might be hidden     A Mini-Cog score of 0-2 suggests the possibility of dementia, score of 3-5 suggests no dementia    Identified Health Risks:     She is at risk for lack of exercise and has been provided with information to increase physical activity for the benefit of her well-being.  The patient was counseled and encouraged to consider modifying their diet and eating habits. She was provided with information on recommended healthy diet options.  The patient was provided with suggestions to help her develop a healthy emotional lifestyle.   Information regarding advance directives (living bess), including where she can download the appropriate form, was provided to the patient via the AVS.

## 2021-06-18 ENCOUNTER — HOSPITAL ENCOUNTER (OUTPATIENT)
Dept: MAMMOGRAPHY | Facility: CLINIC | Age: 71
Discharge: HOME | End: 2021-06-18
Attending: FAMILY MEDICINE
Payer: COMMERCIAL

## 2021-06-18 DIAGNOSIS — Z00.00 MEDICARE ANNUAL WELLNESS VISIT, SUBSEQUENT: ICD-10-CM

## 2021-06-18 DIAGNOSIS — Z12.31 ENCOUNTER FOR SCREENING MAMMOGRAM FOR MALIGNANT NEOPLASM OF BREAST: ICD-10-CM

## 2021-06-19 NOTE — LETTER
Letter by Michele Deleon MD at      Author: Michele Deleon MD Service: -- Author Type: --    Filed:  Encounter Date: 6/3/2019 Status: (Other)         Max Thompson  [address] [place] [id number]             Sara 3, 2019         Dear Ms. Thompson,    Below are the results from your recent visit:    Resulted Orders   Basic Metabolic Panel   Result Value Ref Range    Sodium 142 136 - 145 mmol/L    Potassium 4.7 3.5 - 5.0 mmol/L    Chloride 108 (H) 98 - 107 mmol/L    CO2 25 22 - 31 mmol/L    Anion Gap, Calculation 9 5 - 18 mmol/L    Glucose 94 70 - 125 mg/dL    Calcium 9.6 8.5 - 10.5 mg/dL    BUN 18 8 - 28 mg/dL    Creatinine 0.70 0.60 - 1.10 mg/dL    GFR MDRD Af Amer >60 >60 mL/min/1.73m2    GFR MDRD Non Af Amer >60 >60 mL/min/1.73m2    Narrative    Fasting Glucose reference range is 70-99 mg/dL per  American Diabetes Association (ADA) guidelines.   Hepatic Profile   Result Value Ref Range    Bilirubin, Total 0.6 0.0 - 1.0 mg/dL    Bilirubin, Direct 0.2 <=0.5 mg/dL    Protein, Total 7.0 6.0 - 8.0 g/dL    Albumin 3.8 3.5 - 5.0 g/dL    Alkaline Phosphatase 85 45 - 120 U/L    AST 17 0 - 40 U/L    ALT 23 0 - 45 U/L   Lipid Cascade   Result Value Ref Range    Cholesterol 188 <=199 mg/dL    Triglycerides 98 <=149 mg/dL    HDL Cholesterol 78 >=50 mg/dL    LDL Calculated 90 <=129 mg/dL    Patient Fasting > 8hrs? Yes    Thyroid Cascade   Result Value Ref Range    TSH 1.28 0.30 - 5.00 uIU/mL   Vitamin D, Total (25-Hydroxy)   Result Value Ref Range    Vitamin D, Total (25-Hydroxy) 13.6 (L) 30.0 - 80.0 ng/mL    Narrative    Deficiency <10.0 ng/mL  Insufficiency 10.0-29.9 ng/mL  Sufficiency 30.0-80.0 ng/mL  Toxicity (possible) >100.0 ng/mL     Luz,    Here are you results which we discussed. Your vitamin D level is low. I do recommend vitamin D3 3,000 units daily.    Your kidney and liver tests are normal. Your thyroid test is normal. Your cholesterol numbers are excellent.     Please call with questions or  contact us using [employer].    Sincerely,        Electronically signed by Michele Deleon MD

## 2021-06-23 NOTE — TELEPHONE ENCOUNTER
We discussed Wellbutrin to help with smoking cessation. I sent this to her pharmacy. She can start by taking this once a day in the morning then increase to twice a day. She should give this medication some time to work and let me know if she has a problem. This can affect sleep in some cases.

## 2021-06-23 NOTE — TELEPHONE ENCOUNTER
Pt started taking rx: wellbutrin to help her quit smoking, she started on Monday this week, by weds she was experiencing dizziness to the point of having to hold on the wall, nausea, and just overall not feeling good, she stopped the meds Wednesday, she is taking 150mg 1 a day, per JM's note he mentioned maybe trying the nicotine patch also, she is wondering about this? Or if there is a diff pill she can try? She is worried about food cravings as well so whatever medicine would help with that the most, [facility] [place], please f/u with pt with a plan

## 2021-06-23 NOTE — TELEPHONE ENCOUNTER
Dr. Hurleyarity-  See message below.  Note incomplete.  Unable to que up as med is not on current med list.

## 2021-06-23 NOTE — TELEPHONE ENCOUNTER
Medication Request  Medication name: Wellbutrin  Pharmacy Name and Location: [facility]  Reason for request: Patient states this was discussed at her office visit dated 01/21/19.  When did you use medication last?:  Unknown  Okay to leave a detailed message: yes

## 2021-06-23 NOTE — PROGRESS NOTES
Assessment/ Plan     1. Hyperlipidemia, unspecified hyperlipidemia type    Continue atorvastatin  Reviewed side effects   Check a lipid cascade  - Lipid Cascade    2. Gastroesophageal reflux disease without esophagitis    Continue Prevacid as prescribed  Reviewed dietary and lifestyle changes    3. Anxiety    She will take lorazepam only sparingly  Have reviewed side effects discussed this may be habit-forming    4. Osteopenia    Check laboratory testing  Recommend adequate calcium and vitamin D     Strongly recommend tobacco cessation  - Basic Metabolic Panel  - Hepatic Profile  - Thyroid Cascade  - Vitamin D, Total (25-Hydroxy)  Recommend that she consider a bone density test    5. Left knee pain, unspecified chronicity    X-rays of the left knee appear to be negative for acute fracture or significant arthritis  - XR Knee Left 1 or 2 VWS; Future    Recommend Tylenol as needed  She can consider follow-up with orthopedics if having ongoing pain.  She may be a candidate for cortisone injection  Consider physical therapy    6. Cigarette nicotine dependence without complication  Reviewed smoking cessation options    She may could consider a nicotine patch  Consider also Wellbutrin as an option    7. Vitamin D deficiency    Recommend adequate calcium and vitamin D  - Vitamin D, Total (25-Hydroxy)    8. Abnormal findings on diagnostic imaging of other specified body structures     Check a thyroid Cascade  - Thyroid [place]    9. Anxiety    Recommend that she take lorazepam sparingly  - LORazepam (ATIVAN) 1 MG tablet; Take 1 tablet (1 mg total) by mouth daily as needed for anxiety.  Dispense: 30 tablet; Refill: 0    10. GERD (gastroesophageal reflux disease)    She will continue Prevacid    - lansoprazole (PREVACID) 30 MG capsule; TAKE ONE CAPSULE BY MOUTH ONCE DAILY  Dispense: 90 capsule; Refill: 3    Follow-up as advised      Subjective:       Max Thompson is a 68 y.o. female who presents for a medication check.  She  has a known history of hyperlipidemia and osteopenia.  She continues to use tobacco as well.  Recently, she has had some left knee pain.  She states that she has a bruised feeling involving her knee.  She cannot think of any specific injury.  Her knee does not buckle or give out on her.  This has been going on for several months.    She has a remote history of osteopenia and is due for a bone density check.  Also, she has a history of ongoing reflux symptoms and does take Prevacid which generally manages her symptoms.  She is prone to intermittent anxiety.  She has preferred not to take a daily medication.  Very sparingly she will take a dose of lorazepam which can be helpful.    She continues to smoke approximately 10 cigarettes/day and is interested in quitting.  In the past she has quit cold turkey and also has used nicotine gum.  She is meeting with a  later today to discuss options and may be open to a medication.    The following portions of the patient's history were reviewed and updated as appropriate: allergies, current medications, past family history, past medical history, past social history, past surgical history and problem list. Medications have been reconciled    Review of Systems   A 12 point comprehensive review of systems was negative except as noted.      Current Outpatient Medications   Medication Sig Dispense Refill     lansoprazole (PREVACID) 30 MG capsule TAKE ONE CAPSULE BY MOUTH ONCE DAILY 90 capsule 3     LORazepam (ATIVAN) 1 MG tablet Take 1 tablet (1 mg total) by mouth daily as needed for anxiety. 30 tablet 0     atorvastatin (LIPITOR) 10 MG tablet Take 1 tablet (10 mg total) by mouth daily. 90 tablet 3     buPROPion (WELLBUTRIN SR) 150 MG 12 hr tablet Take one PO QAM for one week then increase to BID 60 tablet 2     No current facility-administered medications for this visit.        Objective:      /74   Pulse 84   Wt 169 lb 1.6 oz (76.7 kg)   LMP 03/15/2015   BMI 28.14  kg/m        General appearance: alert, appears stated age and cooperative  Head: Normocephalic, without obvious abnormality, atraumatic  Eyes: conjunctivae/corneas clear. PERRL, EOM's intact.   Nose: Nares normal. Septum midline. Mucosa normal. No drainage or sinus tenderness.  Throat: lips, mucosa, and tongue normal; teeth and gums normal  Neck: no adenopathy, supple, symmetrical, trachea midline and thyroid not enlarged  Back: symmetric, no curvature. ROM normal. No CVA tenderness.  Lungs: clear to auscultation bilaterally  Heart: regular rate and rhythm, S1, S2 normal, no murmur, click, rub or gallop  Abdomen: soft, non-tender; bowel sounds normal; no masses,  no organomegaly  Extremities: Examination of left knee reveals generally normal range of motion with flexion extension  There is no significant pain with varus or valgus maneuvers  Lachman's test is negative  Skin: Skin color, texture, turgor normal. No rashes or lesions  Lymph nodes: Cervical nodes normal.  Neurologic: Alert and oriented X 3         Recent Results (from the past 168 hour(s))   Basic Metabolic Panel   Result Value Ref Range    Sodium 142 136 - 145 mmol/L    Potassium 4.7 3.5 - 5.0 mmol/L    Chloride 108 (H) 98 - 107 mmol/L    CO2 25 22 - 31 mmol/L    Anion Gap, Calculation 9 5 - 18 mmol/L    Glucose 94 70 - 125 mg/dL    Calcium 9.6 8.5 - 10.5 mg/dL    BUN 18 8 - 28 mg/dL    Creatinine 0.70 0.60 - 1.10 mg/dL    GFR MDRD Af Amer >60 >60 mL/min/1.73m2    GFR MDRD Non Af Amer >60 >60 mL/min/1.73m2   Hepatic Profile   Result Value Ref Range    Bilirubin, Total 0.6 0.0 - 1.0 mg/dL    Bilirubin, Direct 0.2 <=0.5 mg/dL    Protein, Total 7.0 6.0 - 8.0 g/dL    Albumin 3.8 3.5 - 5.0 g/dL    Alkaline Phosphatase 85 45 - 120 U/L    AST 17 0 - 40 U/L    ALT 23 0 - 45 U/L   Lipid Cascade   Result Value Ref Range    Cholesterol 188 <=199 mg/dL    Triglycerides 98 <=149 mg/dL    HDL Cholesterol 78 >=50 mg/dL    LDL Calculated 90 <=129 mg/dL    Patient  Fasting > 8hrs? Yes    Thyroid Cascade   Result Value Ref Range    TSH 1.28 0.30 - 5.00 uIU/mL   Vitamin D, Total (25-Hydroxy)   Result Value Ref Range    Vitamin D, Total (25-Hydroxy) 13.6 (L) 30.0 - 80.0 ng/mL      Radiology:    XR Knee Left 1 or 2 VWS (Order [id number])   Imaging   Date: 1/21/2019 Department: [facility] Released By: Carolina Garcia RT (R) Authorizing: Michele Deleon MD   Study Result     [facility] RADIOLOGY     EXAM: XR KNEE LEFT 1 OR 2 VWS  LOCATION: [facility]  DATE/TIME: 1/21/2019 10:52 AM     INDICATION: Chronic left knee pain  COMPARISON: None.     FINDINGS: Negative knee. No fracture or dislocation. No joint effusion. No arthropathy seen         This note has been dictated using voice recognition software. Any grammatical or context distortions are unintentional and inherent to the software

## 2021-06-23 NOTE — PATIENT INSTRUCTIONS - HE
You had x-rays of your left knee today  If your knee continues to bother you you can consider follow-up with orthopedics  For tobacco cessation you can consider Wellbutrin/bupropion or Chantix  Other options include using nicotine patches  After meeting with your  for tobacco cessation you can leave a message for me by calling the clinic  I do recommend that you consider the tetanus booster  Also, you can consider the Shingrix/shingles vaccine.  Check with your insurance regarding coverage.  I do recommend a bone density test in the future  Remember calcium and vitamin D supplementation

## 2021-06-24 NOTE — TELEPHONE ENCOUNTER
Please call and ask for an update.  Is she still experiencing dizziness?  If so then she probably should discontinue Wellbutrin altogether.  However, if she is able to tolerate the daily dose of Wellbutrin she can continue it.  She could consider a low-dose of a nicotine patch and see how she does.  Other options include switching to Chantix though that can have a lot of side effects. Please let me know what her preference is. Thank you.

## 2021-06-24 NOTE — TELEPHONE ENCOUNTER
Please notify.  I sent a prescription for a nicotine patch.  She can let me know if there is a problem.  I commend her for working to quit tobacco.

## 2021-06-24 NOTE — TELEPHONE ENCOUNTER
Patient Returning Call  Reason for call:  Returning call  Information relayed to patient:  I relayed the message from Dr. Deleon below.   Patient has additional questions:  Yes  If YES, what are your questions/concerns:  Not questions, but answers to Dr. Deleon's questions. Patient states that she has not experienced any dizziness since stopping the Wellbutrin on 02/07/19.  Patient did stop at that time though because of the dizziness. Patient would like to consider the option offered of a low-dose nicotine patch and would like for Dr. Deleon to send a script in to her primary pharmacy listed.  Patient had no further questions, but welcomes a call back if the clinic needs any more information.  Okay to leave a detailed message?: Yes

## 2021-06-28 ENCOUNTER — RECORDS - HEALTHEAST (OUTPATIENT)
Dept: ADMINISTRATIVE | Facility: OTHER | Age: 71
End: 2021-06-28

## 2021-06-29 NOTE — PROGRESS NOTES
"Progress Notes by Cris Aparicio AuD at 9/30/2020 10:40 AM     Author: Cris Aparicio AuD Service: -- Author Type: Audiologist    Filed: 9/30/2020  1:10 PM Encounter Date: 9/30/2020 Status: Signed    : Cris Aparicio AuD (Audiologist)       Audiology Report    Summary: Audiology visit completed. Please see audiogram below or in \"media\" tab for case history and results.     Plan:  The patient is returned to ENT for follow up. Return for retesting with ENT recommendation.     Jed Negrete, [facility]  Clinical Audiologist  [place] #[id number]  [phone]          "

## 2021-06-29 NOTE — PROGRESS NOTES
Progress Notes by Joan Carrillo OT at 8/3/2020  9:30 AM     Author: Joan Carrillo OT Service: -- Author Type: Occupational Therapist    Filed: 8/3/2020 10:34 AM Encounter Date: 8/3/2020 Status: Attested    : Joan Carrillo OT (Occupational Therapist) Cosigner: Zaina Lewis MD at 8/3/2020 12:51 PM    Attestation signed by Zaina Lewis MD at 8/3/2020 12:51 PM    agree                    Rehabilitation Certification Request    August 3, 2020      Patient: Max Thompson  MR Number: [id number]  YOB: 1950  Date of Visit: 8/3/2020      Dear Dr. Zaina Lewis:    Thank you for this referral.   We are seeing Max Thompson in Occupational Therapy for vertigo.    Medicare and/or Medicaid requires physician review and approval of the treatment plan. Please review the plan of care and verify that you agree with the therapy plan of care by co-signing this note.      Plan of Care  Authorization / Certification Start Date: 08/03/20  Authorization / Certification End Date: 11/01/20  Authorization / Certification Number of Visits: 12  Communication with: Referral Source  Patient Related Instruction: Nature of Condition;Treatment plan and rationale;Basis of treatment;Expected outcome;Precautions  Times per Week: 1  Number of Weeks: 12  Number of Visits: 12  Neuromuscular Reeducation: vestibular  Functional Training (ADL's): ADL's  Canolith Repostioning: .      Goals:  Patient will be independent with home exercise program in: 2 weeks  Patient will bend: to dress;without dizziness;without loss of balance;in 12 weeks  Patient able to perform bed mobility: without vertigo;in 2 weeks  Patient will turn head: without dizziness;for conversation;in 12 weeks  Patient will look up / down: without dizziness;for drinking;in 12 weeks        If you have any questions or concerns, please don't hesitate to call.    Sincerely,      Joan Carrillo OT        Physician recommendation:        Size Of Lesion In Cm: 0                          ___ Follow therapist's recommendation                                                                                                    ___ Modify therapy                                                                                                      Physician Signature:_____________________                                                                                                                                        Date:___________________________    *Physician co-signature indicates they certify the need for these services furnished within this plan and while under their care.         Vestibular Initial Evaluation    Patient Name: Max Thompson  Date of evaluation: 8/3/2020  Referral Diagnosis: BPPV  Referring provider: Zaina eLwis MD  Visit Diagnosis:     ICD-10-CM    1. Benign paroxysmal positional vertigo, right  H81.11    2. Dizziness  R42    3. Unsteadiness on feet  R26.81    4. Decreased activities of daily living (ADL)  Z78.9        Assessment:      Patient has questionable right Tru - Hallpike and was treated with canalith maneuver today. Symptoms are also consistent with weakened vestibular function and patient was instructed on adaptation and substitution exercises.    Goals:  Patient will be independent with home exercise program in: 2 weeks  Patient will bend: to dress;without dizziness;without loss of balance;in 12 weeks  Patient able to perform bed mobility: without vertigo;in 2 weeks  Patient will turn head: without dizziness;for conversation;in 12 weeks  Patient will look up / down: without dizziness;for drinking;in 12 weeks      Patient's expectations/goals are realistic.    Barriers to Learning or Achieving Goals:  No Barriers.       Plan / Patient Instructions:        Plan of Care:   Authorization / Certification Start Date: 08/03/20  Authorization / Certification End Date: 11/01/20  Authorization / Certification Number of Visits:  Detail Level: Detailed 12  Communication with: Referral Source  Patient Related Instruction: Nature of Condition;Treatment plan and rationale;Basis of treatment;Expected outcome;Precautions  Times per Week: 1  Number of Weeks: 12  Number of Visits: 12  Neuromuscular Reeducation: vestibular  Functional Training (ADL's): ADL's  Canolith Repostioning: .         Subjective:         History of Present Illness:    Max is a 69 y.o. female who presents to therapy today with complaints of vertigo, nausea and unsteadiness. Patient had sudden onset of symptoms on 2020. She went in to see her PCP and was found to have a sinus and ear infection and was treated accordingly. However, patient continues to have vertigo with position changes. Symptoms are not improving. She has a history of similar symptoms about 15 years that was successfully treated with canalith maneuvers per her report. Patient denies ear pain. Patient denies hearing changes. Functional limitations are described as occurring with balance, bending, bed mobility, head turns, looking up or down, stepping over curbs.    Pain Ratin         Objective:      Note: Items left blank indicates the item was not performed or not indicated at the time of the evaluation.    Patient Outcome Measures:   Dizziness Handicap Inventory  Score in %: 48       Vestibular Disorder Examination  1. Benign paroxysmal positional vertigo, right     2. Dizziness     3. Unsteadiness on feet     4. Decreased activities of daily living (ADL)         Precautions/Restrictions: None    Posture Observation: General standing posture is normal.    ROM:  Not Tested    Strength: Not Tested    Sensation: patient reports normal sensation    Functional Mobility: good      Modified CTSIB:  Normal Surface Eyes Open- Normal  Normal Surface Eyes Closed- Severe sway, near fall  Perturbed Surface Eyes Open-NT  Perturbed Surface Eyes Closed-NT    Oculomotor Assessment: NT  Spontaneous Nystagmus with fixation:    Spontaneous Nystagmus without fixation:  Gaze-evoked nystagmus:  Smooth pursuit:  Saccades:  VOR to slow movement:   VOR Head Thrust:  VOR Cancellation:  OPK's: NT  Static Visual Acuity:  Dynamic Visual Acuity:    Positional Tests:  Hallpike Right:  Negative but patient reports dizziness with laying back and sitting up  Hallpike Left:  Negative  Head Roll Right: NT  Head Roll Left:  NT    Plan for next visit: repeat positional tests    Treatment Today: Patient treated with right Epley maneuver based on symptoms and history. She was also instructed on adaptation and substitution exercises today as well.  X1 viewing- 5 seconds sitting down-instructed  Normal surface eyes closed-instructed  TREATMENT MINUTES COMMENTS   Evaluation 15    Self-care/ Home management     Neuromuscular Re-education 15    Canalith repositioning procedure 10          Total 40    Blank areas are intentional and mean the treatment did not include these items.     GOALS AND PLAN OF CARE WERE ESTABLISHED IN COOPERATION WITH THE PATIENT    OT Evaluation Code: (Please list factors)   Comorbidities:   Patient Active Problem List   Diagnosis   ? Anxiety   ? Esophageal Reflux   ? Hyperlipidemia   ? Osteopenia   ? Vitamin D deficiency   ? Cigarette nicotine dependence without complication       Profile/History Review: Brief    Need for eval modification: No    # Treatment options: Limited    Clinical Decision Making:  Low      Occupational Profile/ Medical and Therapy History and Comorbidities Occupational Performance Clinical Decision Making   (Complexity)   brief history with review of medical/therapy records related to the presenting problem.  No comorbidities 1-3 Performance deficits that result in activity limitations and/or participation restrictions.    No Assessment Modification  Low complexity, which includes  problem-focused assessments, and consideration of a limited number of treatment options.      expanded review of medical/therapy  Anesthesia Volume In Cc: 0.3 Type Of Destruction Used: Curettage records and additional review of physical, cognitive and psychosocial history.    May have comorbidities 3-5 Performance deficits that result in activity limitations and/or participation restrictions.    Minimal to moderate modification of assessment Moderate complexity, which includes analysis of data from detailed assessments, and consideration of several treatment options.         Review of medical/therapy records and extensive additional review of physical, cognitive and psychosocial history.  Comorbidities affect occupational performance 5 or more Performance deficits that result in activity limitations and/or participation restrictions.    Significant modification of assessment High complexity, analysis of  Occupational profile and data,  Comprehensive assessments, multiple treatment options.            Joan Carrillo  8/3/2020  9:32 AM                       Electrodesiccation Text: The wound bed was treated with electrodesiccation after the biopsy was performed. Hemostasis: Drysol Post-Care Instructions: I reviewed with the patient in detail post-care instructions. Patient is to keep the biopsy site dry overnight, and then apply bacitracin twice daily until healed. Patient may apply hydrogen peroxide soaks to remove any crusting. Cryotherapy Text: The wound bed was treated with cryotherapy after the biopsy was performed. Billing Type: Third-Party Bill Biopsy Method: Double edge Personna blades Wound Care: Vaniply Consent: Written consent was obtained and risks were reviewed including but not limited to scarring, infection, bleeding, scabbing, incomplete removal, nerve damage and allergy to anesthesia. Destruction After The Procedure: No Dressing: bandage Silver Nitrate Text: The wound bed was treated with silver nitrate after the biopsy was performed. Anesthesia Type: 1% lidocaine with epinephrine Biopsy Type: H and E Electrodesiccation And Curettage Text: The wound bed was treated with electrodesiccation and curettage after the biopsy was performed.

## 2021-07-04 NOTE — LETTER
Letter by Michele Deleon MD at      Author: Michele Deleon MD Service: -- Author Type: --    Filed:  Encounter Date: 5/30/2021 Status: (Other)         Max Thompson  [address] [facility] [address]             May 30, 2021         Dear Ms. Thompson,    Below are the results from your recent visit:    Resulted Orders   Basic Metabolic Panel   Result Value Ref Range    Sodium 142 136 - 145 mmol/L    Potassium 4.7 3.5 - 5.0 mmol/L    Chloride 107 98 - 107 mmol/L    CO2 26 22 - 31 mmol/L    Anion Gap, Calculation 9 5 - 18 mmol/L    Glucose 89 70 - 125 mg/dL    Calcium 9.3 8.5 - 10.5 mg/dL    BUN 14 8 - 28 mg/dL    Creatinine 0.66 0.60 - 1.10 mg/dL    GFR MDRD Af Amer >60 >60 mL/min/1.73m2    GFR MDRD Non Af Amer >60 >60 mL/min/1.73m2    Narrative    Fasting Glucose reference range is 70-99 mg/dL per  American Diabetes Association (ADA) guidelines.   Lipid Cascade   Result Value Ref Range    Cholesterol 170 <=199 mg/dL    Triglycerides 223 (H) <=149 mg/dL    HDL Cholesterol 60 >=50 mg/dL    LDL Calculated 65 <=129 mg/dL    Patient Fasting > 8hrs? Yes    Hepatic Profile   Result Value Ref Range    Bilirubin, Total 0.6 0.0 - 1.0 mg/dL    Bilirubin, Direct 0.2 <=0.5 mg/dL    Protein, Total 6.7 6.0 - 8.0 g/dL    Albumin 3.8 3.5 - 5.0 g/dL    Alkaline Phosphatase 107 45 - 120 U/L    AST 18 0 - 40 U/L    ALT 18 0 - 45 U/L   HM2(CBC w/o Differential)   Result Value Ref Range    WBC 5.0 4.0 - 11.0 thou/uL    RBC 4.47 3.80 - 5.40 mill/uL    Hemoglobin 13.7 12.0 - 16.0 g/dL    Hematocrit 42.4 35.0 - 47.0 %    MCV 95 80 - 100 fL    MCH 30.6 27.0 - 34.0 pg    MCHC 32.3 32.0 - 36.0 g/dL    RDW 12.5 11.0 - 14.5 %    Platelets 226 140 - 440 thou/uL    MPV 9.8 7.0 - 10.0 fL   Vitamin D, Total (25-Hydroxy)   Result Value Ref Range    Vitamin D, Total (25-Hydroxy) 10.3 (L) 30.0 - 80.0 ng/mL    Narrative    Deficiency <10.0 ng/mL  Insufficiency 10.0-29.9 ng/mL  Sufficiency 30.0-80.0 ng/mL  Toxicity (possible) >100.0  ng/mL       Luz,    Your recent results look very good in general except for your vitamin D level which is low.    Your blood counts are normal.  Your liver and kidney profile are normal.  Your blood sugar test is also normal.    Your triglycerides are a bit high so I do recommend that you work on your diet and exercise as you are able.    Finally, your vitamin D level is low and has remained consistently low.  One option is to consider a weekly prescription dose of vitamin D.  Please let me know if you would like to consider this.  Alternatively, you can take 5000 units of vitamin D3 daily.  You can purchase this over-the-counter.    Please let me know your preference.    It was great to see you.    Please call with questions or contact us using [employer].    Sincerely,        Electronically signed by Michele Deleon MD

## 2021-07-13 ENCOUNTER — RECORDS - HEALTHEAST (OUTPATIENT)
Dept: ADMINISTRATIVE | Facility: CLINIC | Age: 71
End: 2021-07-13

## 2021-07-21 ENCOUNTER — RECORDS - HEALTHEAST (OUTPATIENT)
Dept: ADMINISTRATIVE | Facility: CLINIC | Age: 71
End: 2021-07-21

## 2021-08-06 NOTE — PATIENT INSTRUCTIONS - HE
Patient Instructions by Michele Deleon MD at 4/28/2021  1:40 PM     Author: Michele Deleon MD Service: -- Author Type: Physician    Filed: 4/28/2021  2:33 PM Encounter Date: 4/28/2021 Status: Addendum    : Michele Deleon MD (Physician)    Related Notes: Original Note by Michele Deleon MD (Physician) filed at 4/28/2021  2:32 PM         Patient Education     Exercise for a Healthier Heart  You may wonder how you can improve the health of your heart. If youre thinking about exercise, youre on the right track. You dont need to become an athlete, but you do need a certain amount of brisk exercise to help strengthen your heart. If you have been diagnosed with a heart condition, your doctor may recommend exercise to help stabilize your condition. To help make exercise a habit, choose safe, fun activities.       Be sure to check with your health care provider before starting an exercise program.    Why exercise?  Exercising regularly offers many healthy rewards. It can help you do all of the following:    Improve your blood cholesterol levels to help prevent further heart trouble    Lower your blood pressure to help prevent a stroke or heart attack    Control diabetes, or reduce your risk of getting this disease    Improve your heart and lung function    Reach and maintain a healthy weight    Make your muscles stronger and more limber so you can stay active    Prevent falls and fractures by slowing the loss of bone mass (osteoporosis)    Manage stress better  Exercise tips  Ease into your routine. Set small goals. Then build on them.  Exercise on most days. Aim for a total of 150 or more minutes of moderate to  vigorous intensity activity each week. Consider 40 minutes, 3 to 4 times a week. For best results, activity should last for 40 minutes on average. It is OK to work up to the 40 minute period over time. Examples of moderate-intensity activity is walking one mile in 15  minutes or 30 to 45 minutes of yard work.  Step up your daily activity level. Along with your exercise program, try being more active throughout the day. Walk instead of drive. Do more household tasks or yard work.  Choose one or more activities you enjoy. Walking is one of the easiest things you can do. You can also try swimming, riding a bike, or taking an exercise class.  Stop exercising and call your doctor if you:    Have chest pain or feel dizzy or lightheaded    Feel burning, tightness, pressure, or heaviness in your chest, neck, shoulders, back, or arms    Have unusual shortness of breath    Have increased joint or muscle pain    Have palpitations or an irregular heartbeat      5789-9838 [employer]. [address] [place] [address]. All rights reserved. This information is not intended as a substitute for professional medical care. Always follow your healthcare professional's instructions.         Patient Education   Understanding [employer] MyPlate  The USDA (US Department of Agriculture) has guidelines to help you make healthy food choices. These are called MyPlate. MyPlate shows the food groups that make up healthy meals using the image of a place setting. Before you eat, think about the healthiest choices for what to put onto your plate or into your cup or bowl. To learn more about building a healthy plate, visit www.[employer]myplate.gov.       The Food Groups    Fruits: Any fruit or 100% fruit juice counts as part of the Fruit Group. Fruits may be fresh, canned, frozen, or dried, and may be whole, cut-up, or pureed. Make half your plate fruits and vegetables.    Vegetables: Any vegetable or 100% vegetable juice counts as a member of the Vegetable Group. Vegetables may be fresh, frozen, canned, or dried. They can be served raw or cooked and may be whole, cut-up, or mashed. Make half your plate fruits and vegetables.     Grains: All foods made from grains are part of the Grains Group. These  include wheat, rice, oats, cornmeal, and barley such as bread, pasta, oatmeal, cereal, tortillas, and grits. Grains should be no more than a quarter of your plate. At least half of your grains should be whole grains.    Protein: This group includes meat, poultry, seafood, beans and peas, eggs, processed soy products (like tofu), nuts (including nut butters), and seeds. Make protein choices no more than a quarter of your plate. Meat and poultry choices should be lean or low fat.    Dairy: All fluid milk products and foods made from milk that contain calcium, like yogurt and cheese are part of the Dairy Group. (Foods that have little calcium, such as cream, butter, and cream cheese, are not part of the group.) Most dairy choices should be low-fat or fat-free.    Oils: These are fats that are liquid at room temperature. They include canola, corn, olive, soybean, and sunflower oil. Foods that are mainly oil include mayonnaise, certain salad dressings, and soft margarines. You should have only 5 to 7 teaspoons of oils a day. You probably already get this much from the food you eat.  Use [facility] to Help Build Your Meals  The SuperTracker can help you plan and track your meals and activity. You can look up individual foods to see or compare their nutritional value. You can get guidelines for what and how much you should eat. You can compare your food choices. And you can assess personal physical activities and see ways you can improve. Go to www.[employer].gov/supertracker/.    5915-8963 [employer]. [address] [place] [address]. All rights reserved. This information is not intended as a substitute for professional medical care. Always follow your healthcare professional's instructions.           Patient Education   Your Health Risk Assessment indicates you feel you are not in good emotional health.    Recreation   Recreation is not limited to sports and team events. It includes any  activity that provides relaxation, interest, enjoyment, and exercise. Recreation provides an outlet for physical, mental, and social energy. It can give a sense of worth and achievement. It can help you stay healthy.    Mental Exercise and Social Involvement  Mental and emotional health is as important as physical health. Keep in touch with friends and family. Stay as active as possible. Continue to learn and challenge yourself.   Things you can do to stay mentally active are:    Learn something new, like a foreign language or musical instrument.     Play SCRABBLE or do crossword puzzles. If you cannot find people to play these games with you at home, you can play them with others on your computer through the Internet.     Join a games club--anything from card games to chess or checkers or lawn bowling.     Start a new hobby.     Go back to school.     Volunteer.     Read.     Keep up with world events.       Patient Education   Understanding Advance Care Planning  Advance care planning is the process of deciding ones own future medical care. It helps ensure that if you cant speak for yourself, your wishes can still be carried out. The plan is a series of legal documents that note a persons wishes. The documents vary by state. Advance care planning may be done when a person has a serious illness that is expected to get worse. It may be done before major surgery. And it can help you and your family be prepared in case of a major illness or injury. Advance care planning helps with making decisions at these times.       A health care proxy is a person who acts as the voice of a patient when the patient cant speak for himself or herself. The name of this role varies by state. It may be called a Durable Medical Power of  or Durable Power of  for Healthcare. It may be called an agent, surrogate, or advocate. Or it may be called a representative or decision maker. It is an official duty that is identified  by a legal document. The document also varies by state.    Why Is Advance Care Planning Important?  If a person communicates their healthcare wishes:    They will be given medical care that matches their values and goals.    Their family members will not be forced to make decisions in a crisis with no guidance.  Creating a Plan  Making an advance care plan is often done in 3 steps:    Thinking about ones wishes. To create an advance care plan, you should think about what kind of medical treatment you would want if you lose the ability to communicate. Are there any situations in which you would refuse or stop treatment? Are there therapies you would want or not want? And whom do you want to make decisions for you? There are many places to learn more about how to plan for your care. Ask your doctor or  for resources.    Picking a health care proxy. This means choosing a trusted person to speak for you only when you cant speak for yourself. When you cannot make medical decisions, your proxy makes sure the instructions in your advance care plan are followed. A proxy does not make decisions based on his or her own opinions. They must put aside those opinions and values if needed, and carry out your wishes.    Filling out the legal documents. There are several kinds of legal documents for advance care planning. Each one tells health care providers your wishes. The documents may vary by state. They must be signed and may need to be witnessed or notarized. You can cancel or change them whenever you wish. Depending on your state, the documents may include a Healthcare Proxy form, Living Will, Durable Medical Power of , Advance Directive, or others.  The Familys Role  The best help a family can give is to support their loved ones wishes. Open and honest communication is vital. Family should express any concerns they have about the patients choices while the patient can still make decisions.    [id number]  [employer]. [address] [place] [address], [place] [address]. All rights reserved. This information is not intended as a substitute for professional medical care. Always follow your healthcare professional's instructions.         Also, [facility] offers a free, downloadable health care directive that allows you to share your treatment choices and personal preferences if you cannot communicate your wishes. It also allows you to appoint another person (called a health care agent) to make health care decisions if you are unable to do so. You can download an advance directive by going here: [email or web address]     Patient Education   Personalized Prevention Plan  You are due for the preventive services outlined below.  Your care team is available to assist you in scheduling these services.  If you have already completed any of these items, please share that information with your care team to update in your medical record.  Health Maintenance Due   Topic Date Due   ? DEPRESSION ACTION PLAN  Never done   ? ADVANCE CARE PLANNING  Never done   ? ZOSTER VACCINES (1 of 2) Never done   ? TD 18+ HE  01/12/2019   ? INFLUENZA VACCINE RULE BASED (1) 08/01/2020         I sent Wellbutrin to your pharmacy  Good luck with quitting tobacco!  You can set up a fasting lab appointment   Set up the mammogram

## 2021-10-18 ENCOUNTER — OFFICE VISIT (OUTPATIENT)
Dept: FAMILY MEDICINE | Facility: CLINIC | Age: 71
End: 2021-10-18
Payer: COMMERCIAL

## 2021-10-18 VITALS
OXYGEN SATURATION: 99 % | RESPIRATION RATE: 16 BRPM | DIASTOLIC BLOOD PRESSURE: 84 MMHG | HEART RATE: 72 BPM | SYSTOLIC BLOOD PRESSURE: 138 MMHG

## 2021-10-18 DIAGNOSIS — L03.313 CELLULITIS OF CHEST WALL: Primary | ICD-10-CM

## 2021-10-18 DIAGNOSIS — L02.213 ABSCESS OF CHEST WALL: ICD-10-CM

## 2021-10-18 PROCEDURE — 99213 OFFICE O/P EST LOW 20 MIN: CPT | Mod: 25 | Performed by: NURSE PRACTITIONER

## 2021-10-18 PROCEDURE — 10060 I&D ABSCESS SIMPLE/SINGLE: CPT | Performed by: NURSE PRACTITIONER

## 2021-10-18 RX ORDER — CEPHALEXIN 500 MG/1
500 CAPSULE ORAL 4 TIMES DAILY
Qty: 28 CAPSULE | Refills: 0 | Status: SHIPPED | OUTPATIENT
Start: 2021-10-18 | End: 2021-10-25

## 2021-10-18 RX ORDER — LIDOCAINE HYDROCHLORIDE 20 MG/ML
1 INJECTION, SOLUTION EPIDURAL; INFILTRATION; INTRACAUDAL; PERINEURAL ONCE
Status: COMPLETED | OUTPATIENT
Start: 2021-10-18 | End: 2021-10-18

## 2021-10-18 RX ADMIN — LIDOCAINE HYDROCHLORIDE 1 MG: 20 INJECTION, SOLUTION EPIDURAL; INFILTRATION; INTRACAUDAL; PERINEURAL at 12:08

## 2021-10-18 ASSESSMENT — ENCOUNTER SYMPTOMS
FEVER: 0
CHILLS: 0

## 2021-10-18 NOTE — PATIENT INSTRUCTIONS
Monitor outlined area under left breast for expansion of redness.  Okay for mild expansion outside of the lines within the first 24 hours, but after that wound should not be expanding.  Any abrupt increase in area of erythema or fever, chills should prompt a visit to the emergency room.    This area of redness should be considerably better after 4 to 5 days.  Consider recheck if it has not improved or if the area starts to fill up again and become raised.    Continue to change gauzes as needed for the next 2 days or so or until drainage has stopped.    Tylenol 1000 mg 3 times daily as needed for discomfort.  You may also apply warm packs.

## 2021-10-18 NOTE — PROCEDURES
PROCEDURE: the area was cleaned with alcohol swab and scant amount of 2% lidocaine, approximately 1/2 cc, injected into abscess near area of current opening.  Prepped with betadine and using an 11 blade, a tiny incision was placed into the area of current drainage.  This only returned bloody drainage and no pus.    Patient tolerated procedure without any complications.  Dressed with gauze.    Alexia Flores, CNP

## 2021-10-18 NOTE — PROGRESS NOTES
Assessment & Plan     Cellulitis of chest wall    - cephALEXin (KEFLEX) 500 MG capsule  Dispense: 28 capsule; Refill: 0    Abscess of chest wall    - cephALEXin (KEFLEX) 500 MG capsule  Dispense: 28 capsule; Refill: 0     Patient with abscess that opened on its own.  As there was some scant pus noted, did attempt to enlarge opening with I&D.  This returned no additional pus.    Area of induration/cellulitis treated with cephalexin for 7 days.  Recheck in 4 to 5 days if not significantly better.    Tylenol as needed for discomfort.    Change gauzes as needed.          No follow-ups on file.    Alexia Flores, [facility]    Kodi Escobar is a 71 year old female who presents to clinic today for the following health issues:  Chief Complaint   Patient presents with     Cyst     burst yesterday, underneath L breast bra line     HPI    Patient with abscess located under the left breast.  This opened on its own yesterday.  Was draining pus.  Area was much more raised prior to this.  Describes it as about 2 cm in diameter, starting as the size of a pimple.    Continues to drain a little bit and wonders if she needs antibiotics or similar.  Is sore, but improved over previous.  No Fevers.        Review of Systems   Constitutional: Negative for chills and fever.           Objective    /84   Pulse 72   Resp 16   SpO2 99%   Physical Exam  Constitutional:       General: She is not in acute distress.     Appearance: She is well-developed.   Eyes:      General:         Right eye: No discharge.         Left eye: No discharge.      Conjunctiva/sclera: Conjunctivae normal.   Pulmonary:      Effort: Pulmonary effort is normal.   Musculoskeletal:         General: Normal range of motion.   Skin:     General: Skin is warm and dry.      Capillary Refill: Capillary refill takes less than 2 seconds.      Findings: Erythema (Area of light pink erythema and induration located around area of  spontaneous drainage underneath left breast.  This is approximately 4 cm in diameter.) present.      Comments: Former abscess area appears to be draining scant pus.   Neurological:      Mental Status: She is alert and oriented to person, place, and time.   Psychiatric:         Mood and Affect: Mood normal.         Behavior: Behavior normal.         Thought Content: Thought content normal.         Judgment: Judgment normal.

## 2021-10-19 PROBLEM — F32.9 MAJOR DEPRESSION: Status: ACTIVE | Noted: 2021-05-20

## 2022-01-18 VITALS
WEIGHT: 168 LBS | SYSTOLIC BLOOD PRESSURE: 132 MMHG | RESPIRATION RATE: 16 BRPM | DIASTOLIC BLOOD PRESSURE: 83 MMHG | TEMPERATURE: 96.3 F | HEART RATE: 76 BPM | BODY MASS INDEX: 27.96 KG/M2

## 2022-01-18 VITALS
WEIGHT: 172.6 LBS | HEART RATE: 86 BPM | DIASTOLIC BLOOD PRESSURE: 78 MMHG | TEMPERATURE: 98.1 F | HEIGHT: 65 IN | BODY MASS INDEX: 28.76 KG/M2 | RESPIRATION RATE: 16 BRPM | SYSTOLIC BLOOD PRESSURE: 141 MMHG

## 2022-01-18 VITALS
SYSTOLIC BLOOD PRESSURE: 158 MMHG | HEART RATE: 77 BPM | WEIGHT: 171.4 LBS | DIASTOLIC BLOOD PRESSURE: 78 MMHG | HEIGHT: 65 IN | BODY MASS INDEX: 28.56 KG/M2 | RESPIRATION RATE: 16 BRPM

## 2022-01-18 VITALS
WEIGHT: 167.5 LBS | TEMPERATURE: 97.7 F | DIASTOLIC BLOOD PRESSURE: 74 MMHG | HEART RATE: 82 BPM | SYSTOLIC BLOOD PRESSURE: 116 MMHG | RESPIRATION RATE: 16 BRPM | BODY MASS INDEX: 27.87 KG/M2

## 2022-01-18 ASSESSMENT — PATIENT HEALTH QUESTIONNAIRE - PHQ9
SUM OF ALL RESPONSES TO PHQ QUESTIONS 1-9: 20
SUM OF ALL RESPONSES TO PHQ QUESTIONS 1-9: 3

## 2022-02-21 ENCOUNTER — NURSE TRIAGE (OUTPATIENT)
Dept: NURSING | Facility: CLINIC | Age: 72
End: 2022-02-21
Payer: COMMERCIAL

## 2022-02-21 NOTE — TELEPHONE ENCOUNTER
Triage call    Patient calling to report that  She had a systolic Blood Pressure of 153  She does not remember the diastolic but this was taken at the dentist office.  She is concerned because she has always had a low blood pressure.  She would like to have a nurse visit to get her pressure taken again.    Per protocol see in office in the next 2 weeks she has a appointment for 2 and a half weeks.  Care advice given.  Verbalizes understanding and agrees with plan.  Transferred to scheduling    Henrietta Ervin RN   [facility] Nurse Advisor  2:07 PM 2/21/2022    COVID 19 Nurse Triage Plan/Patient Instructions    Please be aware that novel coronavirus (COVID-19) may be circulating in the community. If you develop symptoms such as fever, cough, or SOB or if you have concerns about the presence of another infection including coronavirus (COVID-19), please contact your health care provider or visit [email or web address].     Disposition/Instructions    In-Person Visit with provider recommended. Reference Visit Selection Guide.    Thank you for taking steps to prevent the spread of this virus.  o Limit your contact with others.  o Wear a simple mask to cover your cough.  o Wash your hands well and often.    Resources    [facility]: About COVID-19: [email or web address]    CDC: What to Do If You're Sick: www.cdc.gov/coronavirus/2019-ncov/about/steps-when-sick.html    CDC: Ending Home Isolation: www.cdc.gov/coronavirus/2019-ncov/hcp/disposition-in-home-patients.html     CDC: Caring for Someone: www.cdc.gov/coronavirus/2019-ncov/if-you-are-sick/care-for-someone.html     [facility]: Interim Guidance for Hospital Discharge to Home: www.health.[facility].[place]/diseases/coronavirus/hcp/hospdischarge.pdf    [facility] clinical trials (COVID-19 research studies): [email or web address]/umn-clinical-trials     Below are the COVID-19 hotlines at the [place] Department of Health ([facility]).  Interpreters are available.   o For health questions: Call 900-840-4665 or 1-979.371.2470 (7 a.m. to 7 p.m.)  o For questions about schools and childcare: Call 963-621-5846 or 1-898.554.9943 (7 a.m. to 7 p.m.)    Reason for Disposition    Systolic BP >= 130 OR Diastolic >= 80, and is not taking BP medications    Additional Information    Negative: Pregnant > 20 weeks or postpartum (< 6 weeks after delivery) and new hand or face swelling    Negative: Pregnant > 20 weeks and BP > 140/90    Negative: Systolic BP >= 160 OR Diastolic >= 100, and any cardiac or neurologic symptoms (e.g., chest pain, difficulty breathing, unsteady gait, blurred vision)    Negative: Patient sounds very sick or weak to the triager    Negative: BP Systolic BP >= 140 OR Diastolic >= 90 and postpartum (from 0 to 6 weeks after delivery)    Negative: Systolic BP >= 180 OR Diastolic >= 110, and missed most recent dose of blood pressure medication    Negative: Systolic BP >= 180 OR Diastolic >= 110    Negative: Patient wants to be seen    Negative: Ran out of BP medications    Negative: Taking BP medications and feels is having side effects (e.g., impotence, cough, dizziness)    Negative: Systolic BP >= 160 OR Diastolic >= 100    Negative: Systolic BP >= 130 OR Diastolic >= 80, and pregnant    Negative: Systolic BP >= 130 OR Diastolic >= 80, and is taking BP medications    Negative: Sounds like a life-threatening emergency to the triager    Protocols used: HIGH BLOOD PRESSURE-A-OH

## 2022-02-24 ENCOUNTER — ALLIED HEALTH/NURSE VISIT (OUTPATIENT)
Dept: FAMILY MEDICINE | Facility: CLINIC | Age: 72
End: 2022-02-24
Payer: COMMERCIAL

## 2022-02-24 VITALS — HEART RATE: 83 BPM | SYSTOLIC BLOOD PRESSURE: 128 MMHG | DIASTOLIC BLOOD PRESSURE: 72 MMHG

## 2022-02-24 DIAGNOSIS — R03.0 ELEVATED BP WITHOUT DIAGNOSIS OF HYPERTENSION: Primary | ICD-10-CM

## 2022-02-24 PROCEDURE — 99207 PR NO CHARGE NURSE ONLY: CPT

## 2022-02-24 NOTE — PROGRESS NOTES
Follow Up Blood Pressure Check    Max Thompson is a 71 year old female recommended to follow up for blood pressure check by Michele Deleon Anihypertensive medications and adherence were verified: Yes.     Reason for visit: elevated BP at dental office on 2/21/22, pt also stated getting upper abdominal discomfort or gassy feeling when feeling anxious, has a lot going on with husbands health. - Pt has appointment w/ Dr ACEVES on 3/11/22 will keep incase she has further concerns.      Medication change at last visit: na    Today's Vitals:   Vitals:    02/24/22 1320   BP: 128/72   BP Location: Right arm   Patient Position: Sitting   Cuff Size: Adult Regular   Pulse: 83       Home blood pressure readings brought in today:   Elevated reading at dental office on 2/21/22, 153 for systolic, unsure about diastolic.     Lowest blood pressure today is less than 140/90 and they deny signs or symptoms of new onset: denies.  Please inform patient of his/her blood pressure today.  If they are asymptomatic, the patient is to continue current medications.  This message will be routed to their provider, and they will be notified if a change in medication is recommended.        Marcos Alvarez    Current Outpatient Medications   Medication Sig Dispense Refill     atorvastatin (LIPITOR) 10 MG tablet Take 1 tablet by mouth daily       LANsoprazole (PREVACID) 30 MG DR capsule Take 1 capsule by mouth daily       LORazepam (ATIVAN) 1 MG tablet Take 1 mg by mouth as needed

## 2022-03-29 ENCOUNTER — TELEPHONE (OUTPATIENT)
Dept: FAMILY MEDICINE | Facility: CLINIC | Age: 72
End: 2022-03-29
Payer: COMMERCIAL

## 2022-03-29 NOTE — TELEPHONE ENCOUNTER
Incoming call from patient stating Dr. Deleon reached out to her the other day since her  just passed away. Pt is wondering if provider is able to send a rx to the pharmacy for pt. Pt has been having trouble sleeping and would like a good couple nights of sleep. If provider has any questions, OK to call pt back; Ok to leave detailed message.

## 2022-03-29 NOTE — TELEPHONE ENCOUNTER
Called and reviewed.  Expressed condolences.  Her  that he just passed away recently.  This has been challenging for her.  We reviewed options.  She has been feeling increasingly anxious.  She has taken lorazepam in the past and tolerated that just fine.  We reviewed potential risks and she may take that in the short-term.  Discussed we may need to consider other options for sleep including trazodone.  She will provide an update.

## 2022-04-13 ENCOUNTER — TELEPHONE (OUTPATIENT)
Dept: FAMILY MEDICINE | Facility: CLINIC | Age: 72
End: 2022-04-13
Payer: COMMERCIAL

## 2022-04-13 DIAGNOSIS — F41.9 ANXIETY: Primary | ICD-10-CM

## 2022-04-13 NOTE — TELEPHONE ENCOUNTER
Reason for call:  Patient reporting a symptom    Symptom or request: unable to sleep, not eating, depression    Duration (how long have symptoms been present):   Two weeks    Have you been treated for this before? No    Additional comments: patients  passed away 3/28. She has not been able to eat and sleep regularly since.  Feels depressed.  Feels like its getting worse. Would like a medication to help her but doesn't want to feel dizzy on it.  Please call patient to advise.  Has appt on 4/29 but doesn't want to wait that long.    Phone Number patient can be reached at:  Home number on file [phone] (home)    Best Time:  any    Can we leave a detailed message on this number:  YES    Call taken on 4/13/2022 at 8:43 AM by GABRIEL SNYDER

## 2022-04-14 RX ORDER — LORAZEPAM 1 MG/1
1 TABLET ORAL PRN
Qty: 15 TABLET | Refills: 0 | Status: SHIPPED | OUTPATIENT
Start: 2022-04-14 | End: 2022-04-21

## 2022-04-14 NOTE — TELEPHONE ENCOUNTER
Patient calling back.  She says she had a better night.  She would still like to talk to Dr Deleon for suggestions.

## 2022-04-14 NOTE — TELEPHONE ENCOUNTER
LEROY, only. No need to call.     I called and reviewed with Luz. She took melatonin and slept better. She has taken lorazepam sparingly. She has not tolerated trazodone in the past. She will monitor and we can consider options. Will prescribe a refill of lorazepam. Recommend not driving while taking that. We can consider other options in the future.

## 2022-04-29 ENCOUNTER — OFFICE VISIT (OUTPATIENT)
Dept: FAMILY MEDICINE | Facility: CLINIC | Age: 72
End: 2022-04-29
Payer: COMMERCIAL

## 2022-04-29 VITALS
HEIGHT: 65 IN | WEIGHT: 152.2 LBS | DIASTOLIC BLOOD PRESSURE: 65 MMHG | SYSTOLIC BLOOD PRESSURE: 112 MMHG | BODY MASS INDEX: 25.36 KG/M2 | RESPIRATION RATE: 16 BRPM | TEMPERATURE: 98.2 F | HEART RATE: 79 BPM

## 2022-04-29 DIAGNOSIS — K21.9 GASTROESOPHAGEAL REFLUX DISEASE WITHOUT ESOPHAGITIS: ICD-10-CM

## 2022-04-29 DIAGNOSIS — E78.5 HYPERLIPIDEMIA, UNSPECIFIED HYPERLIPIDEMIA TYPE: ICD-10-CM

## 2022-04-29 DIAGNOSIS — E55.9 VITAMIN D DEFICIENCY: ICD-10-CM

## 2022-04-29 DIAGNOSIS — Z00.00 ENCOUNTER FOR MEDICARE ANNUAL WELLNESS EXAM: Primary | ICD-10-CM

## 2022-04-29 DIAGNOSIS — F17.200 NICOTINE DEPENDENCE, UNCOMPLICATED, UNSPECIFIED NICOTINE PRODUCT TYPE: ICD-10-CM

## 2022-04-29 LAB
ALBUMIN SERPL-MCNC: 4.1 G/DL (ref 3.5–5)
ALP SERPL-CCNC: 105 U/L (ref 45–120)
ALT SERPL W P-5'-P-CCNC: 11 U/L (ref 0–45)
ANION GAP SERPL CALCULATED.3IONS-SCNC: 12 MMOL/L (ref 5–18)
AST SERPL W P-5'-P-CCNC: 13 U/L (ref 0–40)
BILIRUB DIRECT SERPL-MCNC: 0.3 MG/DL
BILIRUB SERPL-MCNC: 0.8 MG/DL (ref 0–1)
BUN SERPL-MCNC: 17 MG/DL (ref 8–28)
CALCIUM SERPL-MCNC: 9.8 MG/DL (ref 8.5–10.5)
CHLORIDE BLD-SCNC: 107 MMOL/L (ref 98–107)
CHOLEST SERPL-MCNC: 169 MG/DL
CO2 SERPL-SCNC: 24 MMOL/L (ref 22–31)
CREAT SERPL-MCNC: 0.76 MG/DL (ref 0.6–1.1)
ERYTHROCYTE [DISTWIDTH] IN BLOOD BY AUTOMATED COUNT: 12.2 % (ref 10–15)
FASTING STATUS PATIENT QL REPORTED: YES
GFR SERPL CREATININE-BSD FRML MDRD: 82 ML/MIN/1.73M2
GLUCOSE BLD-MCNC: 96 MG/DL (ref 70–125)
HCT VFR BLD AUTO: 44.1 % (ref 35–47)
HDLC SERPL-MCNC: 60 MG/DL
HGB BLD-MCNC: 14.5 G/DL (ref 11.7–15.7)
LDLC SERPL CALC-MCNC: 82 MG/DL
MCH RBC QN AUTO: 30.9 PG (ref 26.5–33)
MCHC RBC AUTO-ENTMCNC: 32.9 G/DL (ref 31.5–36.5)
MCV RBC AUTO: 94 FL (ref 78–100)
PLATELET # BLD AUTO: 227 10E3/UL (ref 150–450)
POTASSIUM BLD-SCNC: 4.4 MMOL/L (ref 3.5–5)
PROT SERPL-MCNC: 6.9 G/DL (ref 6–8)
RBC # BLD AUTO: 4.69 10E6/UL (ref 3.8–5.2)
SODIUM SERPL-SCNC: 143 MMOL/L (ref 136–145)
TRIGL SERPL-MCNC: 136 MG/DL
WBC # BLD AUTO: 6.6 10E3/UL (ref 4–11)

## 2022-04-29 PROCEDURE — 85027 COMPLETE CBC AUTOMATED: CPT | Performed by: FAMILY MEDICINE

## 2022-04-29 PROCEDURE — 99397 PER PM REEVAL EST PAT 65+ YR: CPT | Performed by: FAMILY MEDICINE

## 2022-04-29 PROCEDURE — 80061 LIPID PANEL: CPT | Performed by: FAMILY MEDICINE

## 2022-04-29 PROCEDURE — 80053 COMPREHEN METABOLIC PANEL: CPT | Performed by: FAMILY MEDICINE

## 2022-04-29 PROCEDURE — 82248 BILIRUBIN DIRECT: CPT | Performed by: FAMILY MEDICINE

## 2022-04-29 PROCEDURE — 82306 VITAMIN D 25 HYDROXY: CPT | Performed by: FAMILY MEDICINE

## 2022-04-29 PROCEDURE — 36415 COLL VENOUS BLD VENIPUNCTURE: CPT | Performed by: FAMILY MEDICINE

## 2022-04-29 ASSESSMENT — ACTIVITIES OF DAILY LIVING (ADL): CURRENT_FUNCTION: NO ASSISTANCE NEEDED

## 2022-04-29 ASSESSMENT — PATIENT HEALTH QUESTIONNAIRE - PHQ9
SUM OF ALL RESPONSES TO PHQ QUESTIONS 1-9: 16
SUM OF ALL RESPONSES TO PHQ QUESTIONS 1-9: 16

## 2022-04-29 NOTE — PROGRESS NOTES
"    The patient was provided with suggestions to help her develop a healthy physical lifestyle.  She is at risk for lack of exercise and has been provided with information to increase physical activity for the benefit of her well-being.  The patient was counseled and encouraged to consider modifying their diet and eating habits. She was provided with information on recommended healthy diet options.  Information on urinary incontinence and treatment options given to patient.  The patient was provided with suggestions to help her develop a healthy emotional lifestyle.  The patient was provided with suggestions to help her develop a healthy physical lifestyle.  She is at risk for lack of exercise and has been provided with information to increase physical activity for the benefit of her well-being.  The patient was counseled and encouraged to consider modifying their diet and eating habits. She was provided with information on recommended healthy diet options.  Information on urinary incontinence and treatment options given to patient.  The patient was provided with suggestions to help her develop a healthy emotional lifestyle.  Answers for HPI/ROS submitted by the patient on 4/29/2022  PHQ9 TOTAL SCORE: 16  In general, how would you rate your overall physical health?: fair  Frequency of exercise:: 1 day/week  Do you usually eat at least 4 servings of fruit and vegetables a day, include whole grains & fiber, and avoid regularly eating high fat or \"junk\" foods? : No  Taking medications regularly:: Yes  Medication side effects:: Lightheadedness  Activities of Daily Living: no assistance needed  Home safety: no safety concerns identified  Hearing Impairment:: no hearing concerns  In the past 6 months, have you been bothered by leaking of urine?: Yes  In general, how would you rate your overall mental or emotional health?: fair  Additional concerns today:: No  Duration of exercise:: Less than 15 minutes      "

## 2022-04-29 NOTE — PROGRESS NOTES
"SUBJECTIVE:   Max Thompson is a 71 year old female who presents for Preventive Visit.      This is a pleasant 71-year-old female who presents to clinic for annual wellness visit.  Recent history is notable for the fact that her  Geovani passed away recently and this has been very challenging.  He  from complications of chronic struct of pulmonary disease as well as heart failure.  She has been increasingly anxious and will take occasional lorazepam as that has been the only medicine which has been effective.  She has tolerated this without significant side effects and is aware of the potential risks.  She prefers not to take a daily medication.    Her medical history is notable for hyperlipidemia, gastroesophageal reflux disease, and nicotine dependence.     She continues to take atorvastatin 10 mg daily.  She also takes lansoprazole 30 mg daily.  Her symptoms have generally been well controlled.  She has not been able to wean from her reflux medication.     She does have a history of right knee arthritis and has followed up with orthopedics and has had knee injections.     Her last total cholesterol is 170 with an LDL of 65.      Patient has been advised of split billing requirements and indicates understanding: Yes  Are you in the first 12 months of your Medicare coverage?  No    Healthy Habits:     In general, how would you rate your overall health?  Fair    Frequency of exercise:  1 day/week    Duration of exercise:  Less than 15 minutes    Do you usually eat at least 4 servings of fruit and vegetables a day, include whole grains    & fiber and avoid regularly eating high fat or \"junk\" foods?  No    Taking medications regularly:  Yes    Medication side effects:  Lightheadedness    Ability to successfully perform activities of daily living:  No assistance needed    Home Safety:  No safety concerns identified    Hearing Impairment:  No hearing concerns    In the past 6 months, have you been bothered by " leaking of urine? Yes    In general, how would you rate your overall mental or emotional health?  Fair      PHQ-2 Total Score: 5    Additional concerns today:  No    Do you feel safe in your environment? Yes    Have you ever done Advance Care Planning? (For example, a Health Directive, POLST, or a discussion with a medical provider or your loved ones about your wishes): No, advance care planning information given to patient to review.  Patient declined advance care planning discussion at this time.      Fall risk  Fallen 2 or more times in the past year?: No  Any fall with injury in the past year?: No    Cognitive Screening   1) Repeat 3 items (Leader, Season, Table)    2) Clock draw:NORMAL  3) 3 item recall: Recalls 1 object   Results: NORMAL clock, 1-2 items recalled: COGNITIVE IMPAIRMENT LESS LIKELY    Mini-CogTM Copyright S [employer]. Licensed by the author for use in [facility]; reprinted with permission ([email or web address]). All rights reserved.      Do you have sleep apnea, excessive snoring or daytime drowsiness?: no    Reviewed and updated as needed this visit by clinical staff   Tobacco  Allergies  Meds                Reviewed and updated as needed this visit by Provider                   Social History     Tobacco Use     Smoking status: Light Tobacco Smoker     Packs/day: 0.50     Smokeless tobacco: Never Used     Tobacco comment: quit 5/2020   Substance Use Topics     Alcohol use: Not on file     Comment: Occasionally     If you drink alcohol do you typically have >3 drinks per day or >7 drinks per week? No    Alcohol Use 4/29/2022   Prescreen: >3 drinks/day or >7 drinks/week? No   No flowsheet data found.        Current providers sharing in care for this patient include:  Patient Care Team:  Michele Deleon MD as PCP - General (Family Practice)  Yeimi Sepulveda MD as PCP - Obstetrics/Gynecology  Pierre Herrera MD as MD (Neurology)  Pierre Herrera MD as Assigned  Neuroscience Provider  Michele Deleon MD as Assigned PCP Answers for HPI/ROS submitted by the patient on 4/29/2022  PHQ9 TOTAL SCORE: 16        The following health maintenance items are reviewed in Epic and correct as of today:  Health Maintenance Due   Topic Date Due     ANNUAL REVIEW OF  ORDERS  Never done     DEPRESSION ACTION PLAN  Never done     ZOSTER IMMUNIZATION (1 of 2) Never done     LUNG CANCER SCREENING  Never done     DTAP/TDAP/TD IMMUNIZATION (2 - Td or Tdap) 01/12/2019     INFLUENZA VACCINE (1) 09/01/2021     PHQ-9  10/28/2021     FALL RISK ASSESSMENT  04/28/2022     MEDICARE ANNUAL WELLNESS VISIT  04/28/2022     Patient Active Problem List   Diagnosis     Anxiety     Esophageal Reflux     Hyperlipidemia     Osteopenia     Vitamin D deficiency     Cigarette nicotine dependence without complication     Anxiety     Moderate major depression (H)     History reviewed. No pertinent surgical history.    Social History     Tobacco Use     Smoking status: Light Tobacco Smoker     Packs/day: 0.50     Smokeless tobacco: Never Used     Tobacco comment: quit 5/2020   Substance Use Topics     Alcohol use: Not on file     Comment: Occasionally     Family History   Problem Relation Age of Onset     Cancer Son      No Known Problems Mother      No Known Problems Father      No Known Problems Sister      No Known Problems Daughter      No Known Problems Maternal Grandmother      No Known Problems Maternal Grandfather      No Known Problems Paternal Grandmother      No Known Problems Paternal Grandfather      No Known Problems Maternal Aunt      No Known Problems Paternal Aunt      Hereditary Breast and Ovarian Cancer Syndrome No family hx of      Breast Cancer No family hx of      Cancer No family hx of      Colon Cancer No family hx of      Endometrial Cancer No family hx of      Ovarian Cancer No family hx of          Current Outpatient Medications   Medication Sig Dispense Refill     atorvastatin (LIPITOR)  "10 MG tablet Take 1 tablet by mouth daily       LANsoprazole (PREVACID) 30 MG DR capsule Take 1 capsule by mouth daily       LORazepam (ATIVAN) 1 MG tablet TAKE 1 TABLET BY MOUTH DAILY AS NEEDED FOR ANXIETY 30 tablet 0     No Known Allergies          Review of Systems  Constitutional, HEENT, cardiovascular, pulmonary, GI, , musculoskeletal, neuro, skin, endocrine and psych systems are negative, except as otherwise noted.    OBJECTIVE:   /65 (BP Location: Left arm, Patient Position: Sitting, Cuff Size: Adult Regular)   Pulse 79   Temp 98.2  F (36.8  C) (Oral)   Resp 16   Ht 1.645 m (5' 4.75\")   Wt 69 kg (152 lb 3.2 oz)   BMI 25.52 kg/m   Estimated body mass index is 25.52 kg/m  as calculated from the following:    Height as of this encounter: 1.645 m (5' 4.75\").    Weight as of this encounter: 69 kg (152 lb 3.2 oz).  Physical Exam  GENERAL: healthy, alert and no distress  EYES: Eyes grossly normal to inspection, PERRL and conjunctivae and sclerae normal  HENT: ear canals and TM's normal, nose and mouth without ulcers or lesions  NECK: no adenopathy, no asymmetry, masses, or scars and thyroid normal to palpation  RESP: lungs clear to auscultation - no rales, rhonchi or wheezes  CV: regular rate and rhythm, normal S1 S2, no S3 or S4, no murmur, click or rub, no peripheral edema and peripheral pulses strong  ABDOMEN: soft, nontender, without hepatosplenomegaly or masses  MS: no gross musculoskeletal defects noted, no edema  SKIN: no suspicious lesions or rashes  NEURO: Normal strength and tone, mentation intact and speech normal  PSYCH: mentation appears normal, affect normal/bright    Diagnostic Test Results:  Labs reviewed in Epic    ASSESSMENT / PLAN:   Diagnoses and all orders for this visit:    Encounter for Medicare annual wellness exam    Reviewed the annual wellness visit health risk assessment form  Reviewed vaccines needed which are declined    Osteopenia    Recommend follow-up for a bone density " "test.  She prefers to defer this  Recommend adequate calcium and vitamin D as well as weightbearing exercise    Colon cancer screening  Her colonoscopy was in February 2020 and she will be due in February 2025      Vitamin D deficiency    Check vitamin D level  Recommend vitamin D supplementation  -     Vitamin D Deficiency; Future  -     Vitamin D Deficiency    Gastroesophageal reflux disease without esophagitis    Continue lansoprazole which has been effective  Consider weaning to famotidine    -     Basic metabolic panel; Future  -     CBC with platelets; Future  -     Basic metabolic panel  -     CBC with platelets    Hyperlipidemia, unspecified hyperlipidemia type    Continue atorvastatin  Check laboratory testing  -     Hepatic function panel; Future  -     Lipid panel reflex to direct LDL Fasting; Future  -     Hepatic function panel  -     Lipid panel reflex to direct LDL Fasting    Nicotine dependence, uncomplicated, unspecified nicotine product type    Counseled the patient regarding tobacco cessation  She has tried Wellbutrin in the past  Given the recent stress she is not ready to quit but will contemplate this for the future    Anxiety  Grieving    She will take lorazepam sparingly and understands potential risks    Patient has been advised of split billing requirements and indicates understanding: Yes    COUNSELING:  Reviewed preventive health counseling, as reflected in patient instructions       Regular exercise       Healthy diet/nutrition       Alcohol Use        Colon cancer screening    Estimated body mass index is 25.52 kg/m  as calculated from the following:    Height as of this encounter: 1.645 m (5' 4.75\").    Weight as of this encounter: 69 kg (152 lb 3.2 oz).    Weight management plan: Discussed healthy diet and exercise guidelines    She reports that she has been smoking. She has been smoking about 0.50 packs per day. She has never used smokeless tobacco.  Tobacco Cessation Action Plan: "   Information offered: Patient not interested at this time      Appropriate preventive services were discussed with this patient, including applicable screening as appropriate for cardiovascular disease, diabetes, osteopenia/osteoporosis, and glaucoma.  As appropriate for age/gender, discussed screening for colorectal cancer, prostate cancer, breast cancer, and cervical cancer. Checklist reviewing preventive services available has been given to the patient.    Reviewed patients plan of care and provided an AVS. The Basic Care Plan (routine screening as documented in Health Maintenance) for Max meets the Care Plan requirement. This Care Plan has been established and reviewed with the Patient.    Counseling Resources:  ATP IV Guidelines  Pooled Cohorts Equation Calculator  Breast Cancer Risk Calculator  Breast Cancer: Medication to Reduce Risk  FRAX Risk Assessment  ICSI Preventive Guidelines  Dietary Guidelines for Americans, 2010  USDA's MyPlate  ASA Prophylaxis  Lung CA Screening    Michele Deleon MD  [facility]    Identified Health Risks:

## 2022-04-29 NOTE — PATIENT INSTRUCTIONS
Luz,    It was good to see you  We will check your laboratory tests as discussed  Remember adequate calcium 1200 mg plus vitamin D 1000 units daily  Weightbearing exercise such as light weights or resistance bands are good for bone strength  A colonoscopy will be due in 2025  You can consider a tetanus booster or the shingles vaccine  You can also consider the COVID booster  Please tried to limit or quit tobacco  Please let me know when you need refills    Michele Deleon MD      Patient Education   Personalized Prevention Plan  You are due for the preventive services outlined below.  Your care team is available to assist you in scheduling these services.  If you have already completed any of these items, please share that information with your care team to update in your medical record.  Health Maintenance Due   Topic Date Due    ANNUAL REVIEW OF HM ORDERS  Never done    Depression Action Plan  Never done    Zoster (Shingles) Vaccine (1 of 2) Never done    LUNG CANCER SCREENING  Never done    Diptheria Tetanus Pertussis (DTAP/TDAP/TD) Vaccine (2 - Td or Tdap) 01/12/2019    Flu Vaccine (1) 09/01/2021    Depression Assessment  10/28/2021    FALL RISK ASSESSMENT  04/28/2022     Your Health Risk Assessment indicates you feel you are not in good health    A healthy lifestyle helps keep the body fit and the mind alert. It helps protect you from disease, helps you fight disease, and helps prevent chronic disease (disease that doesn't go away) from getting worse. This is important as you get older and begin to notice twinges in muscles and joints and a decline in the strength and stamina you once took for granted. A healthy lifestyle includes good healthcare, good nutrition, weight control, recreation, and regular exercise. Avoid harmful substances and do what you can to keep safe. Another part of a healthy lifestyle is stay mentally active and socially involved.    Good healthcare   Have a wellness visit every  year.   If you have new symptoms, let us know right away. Don't wait until the next checkup.   Take medicines exactly as prescribed and keep your medicines in a safe place. Tell us if your medicine causes problems.   Healthy diet and weight control   Eat 3 or 4 small, nutritious, low-fat, high-fiber meals a day. Include a variety of fruits, vegetables, and whole-grain foods.   Make sure you get enough calcium in your diet. Calcium, vitamin D, and exercise help prevent osteoporosis (bone thinning).   If you live alone, try eating with others when you can. That way you get a good meal and have company while you eat it.   Try to keep a healthy weight. If you eat more calories than your body uses for energy, it will be stored as fat and you will gain weight.     Recreation   Recreation is not limited to sports and team events. It includes any activity that provides relaxation, interest, enjoyment, and exercise. Recreation provides an outlet for physical, mental, and social energy. It can give a sense of worth and achievement. It can help you stay healthy.    Mental Exercise and Social Involvement  Mental and emotional health is as important as physical health. Keep in touch with friends and family. Stay as active as possible. Continue to learn and challenge yourself.   Things you can do to stay mentally active are:  Learn something new, like a foreign language or musical instrument.   Play SCRABBLE or do crossword puzzles. If you cannot find people to play these games with you at home, you can play them with others on your computer through the Internet.   Join a games club--anything from card games to chess or checkers or lawn bowling.   Start a new hobby.   Go back to school.   Volunteer.   Read.   Keep up with world events.    Exercise for a Healthier Heart  You may wonder how you can improve the health of your heart. If you re thinking about exercise, you re on the right track. You don t need to become an athlete.  But you do need a certain amount of brisk exercise to help strengthen your heart. If you have been diagnosed with a heart condition, your healthcare provider may advise exercise to help stabilize your condition. To help make exercise a habit, choose safe, fun activities.      Exercise with a friend. When activity is fun, you're more likely to stick with it.   Before you start  Check with your healthcare provider before starting an exercise program. This is especially important if you have not been active for a while. It's also important if you have a long-term (chronic) health problem such as heart disease, diabetes, or obesity. Or if you are at high risk for having these problems.   Why exercise?  Exercising regularly offers many healthy rewards. It can help you do all of the following:   Improve your blood cholesterol level to help prevent further heart trouble  Lower your blood pressure to help prevent a stroke or heart attack  Control diabetes, or reduce your risk of getting this disease  Improve your heart and lung function  Reach and stay at a healthy weight  Make your muscles stronger so you can stay active  Prevent falls and fractures by slowing the loss of bone mass (osteoporosis)  Manage stress better  Reduce your blood pressure  Improve your sense of self and your body image  Exercise tips    Ease into your routine. Set small goals. Then build on them. If you are not sure what your activity level should be, talk with your healthcare provider first before starting an exercise routine.  Exercise on most days. Aim for a total of 150 minutes (2 hours and 30 minutes) or more of moderate-intensity aerobic activity each week. Or 75 minutes (1 hour and 15 minutes) or more of vigorous-intensity aerobic activity each week. Or try for a combination of both. Moderate activity means that you breathe heavier and your heart rate increases but you can still talk. Think about doing 40 minutes of moderate exercise, 3 to 4  times a week. For best results, activity should last for about 40 minutes to lower blood pressure and cholesterol. It's OK to work up to the 40-minute period over time. Examples of moderate-intensity activity are walking 1 mile in 15 minutes. Or doing 30 to 45 minutes of yard work.  Step up your daily activity level.  Along with your exercise program, try being more active the whole day. Walk instead of drive. Or park further away so that you take more steps each day. Do more household tasks or yard work. You may not be able to meet the advised mount of physical activity. But doing some moderate- or vigorous-intensity aerobic activity can help reduce your risk for heart disease. Your healthcare provider can help you figure out what is best for you.  Choose 1 or more activities you enjoy.  Walking is one of the easiest things you can do. You can also try swimming, riding a bike, dancing, or taking an exercise class.    When to call your healthcare provider  Call your healthcare provider if you have any of these:   Chest pain or feel dizzy or lightheaded  Burning, tightness, pressure, or heaviness in your chest, neck, shoulders, back, or arms  Abnormal shortness of breath  More joint or muscle pain  A very fast or irregular heartbeat (palpitations)  [employer] last reviewed this educational content on 7/1/2019 2000-2021 [employer]. All rights reserved. This information is not intended as a substitute for professional medical care. Always follow your healthcare professional's instructions.          Understanding USDA MyPlate  The USDA has guidelines to help you make healthy food choices. These are called MyPlate. MyPlate shows the food groups that make up healthy meals using the image of a place setting. Before you eat, think about the healthiest choices for what to put on your plate or in your cup or bowl. To learn more about building a healthy plate, visit www.choosemyplate.gov.    The food  groups  Fruits. Any fruit or 100% fruit juice counts as part of the Fruit Group. Fruits may be fresh, canned, frozen, or dried, and may be whole, cut-up, or pureed. Make 1/2 of your plate fruits and vegetables.  Vegetables. Any vegetable or 100% vegetable juice counts as a member of the Vegetable Group. Vegetables may be fresh, frozen, canned, or dried. They can be served raw or cooked and may be whole, cut-up, or mashed. Make 1/2 of your plate fruits and vegetables.  Grains. All foods made from grains are part of the Grains Group. These include wheat, rice, oats, cornmeal, and barley. Grains are often used to make foods such as bread, pasta, oatmeal, cereal, tortillas, and grits. Grains should be no more than 1/4 of your plate. At least half of your grains should be whole grains.  Protein. This group includes meat, poultry, seafood, beans and peas, eggs, processed soy products (such as tofu), nuts (including nut butters), and seeds. Make protein choices no more than 1/4 of your plate. Meat and poultry choices should be lean or low fat.  Dairy. The Dairy Group includes all fluid milk products and foods made from milk that contain calcium, such as yogurt and cheese. (Foods that have little calcium, such as cream, butter, and cream cheese, are not part of this group.) Most dairy choices should be low-fat or fat-free.  Oils. Oils aren't a food group, but they do contain essential nutrients. However it's important to watch your intake of oils. These are fats that are liquid at room temperature. They include canola, corn, olive, soybean, vegetable, and sunflower oil. Foods that are mainly oil include mayonnaise, certain salad dressings, and soft margarines. You likely already get your daily oil allowance from the foods you eat.  Things to limit  Eating healthy also means limiting these things in your diet:     Salt (sodium). Many processed foods have a lot of sodium. To keep sodium intake down, eat fresh vegetables,  meats, poultry, and seafood when possible. Purchase low-sodium, reduced-sodium, or no-salt-added food products at the store. And don't add salt to your meals at home. Instead, season them with herbs and spices such as dill, oregano, cumin, and paprika. Or try adding flavor with lemon or lime zest and juice.  Saturated fat. Saturated fats are most often found in animal products such as beef, pork, and chicken. They are often solid at room temperature, such as butter. To reduce your saturated fat intake, choose leaner cuts of meat and poultry. And try healthier cooking methods such as grilling, broiling, roasting, or baking. For a simple lower-fat swap, use plain nonfat yogurt instead of mayonnaise when making potato salad or macaroni salad.  Added sugars. These are sugars added to foods. They are in foods such as ice cream, candy, soda, fruit drinks, sports drinks, energy drinks, cookies, pastries, jams, and syrups. Cut down on added sugars by sharing sweet treats with a family member or friend. You can also choose fruit for dessert, and drink water or other unsweetened beverages.     [employer] last reviewed this educational content on 6/1/2020 2000-2021 [employer]. All rights reserved. This information is not intended as a substitute for professional medical care. Always follow your healthcare professional's instructions.          Urinary Incontinence, Female (Adult)   Urinary incontinence means loss of bladder control. This problem affects many women, especially as they get older. If you have incontinence, you may be embarrassed to ask for help. But know that this problem can be treated.   Types of Incontinence  There are different types of incontinence. Two of the main types are described here. You can have more than one type.   Stress incontinence. With this type, urine leaks when pressure (stress) is put on the bladder. This may happen when you cough, sneeze, or laugh. Stress incontinence most  often occurs because the pelvic floor muscles that support the bladder and urethra are weak. This can happen after pregnancy and vaginal childbirth or a hysterectomy. It can also be due to excess body weight or hormone changes.  Urge incontinence (also called overactive bladder). With this type, a sudden urge to urinate is felt often. This may happen even though there may not be much urine in the bladder. The need to urinate often during the night is common. Urge incontinence most often occurs because of bladder spasms. This may be due to bladder irritation or infection. Damage to bladder nerves or pelvic muscles, constipation, and certain medicines can also lead to urge incontinence.  Treatment depends on the cause. Further evaluation is needed to find the type you have. This will likely include an exam and certain tests. Based on the results, you and your healthcare provider can then plan treatment. Until a diagnosis is made, the home care tips below can help ease symptoms.   Home care  Do pelvic floor muscle exercises, if they are prescribed. The pelvic floor muscles help support the bladder and urethra. Many women find that their symptoms improve when doing special exercises that strengthen these muscles. To do the exercises, contract the muscles you would use to stop your stream of urine. But do this when you re not urinating. Hold for 10 seconds, then relax. Repeat 10 to 20 times in a row, at least 3 times a day. Your healthcare provider may give you other instructions for how to do the exercises and how often.  Keep a bladder diary. This helps track how often and how much you urinate over a set period of time. Bring this diary with you to your next visit with the provider. The information can help your provider learn more about your bladder problem.  Lose weight, if advised to by your provider. Extra weight puts pressure on the bladder. Your provider can help you create a weight-loss plan that s right for  you. This may include exercising more and making certain diet changes.  Don't have foods and drinks that may irritate the bladder. These can include alcohol and caffeinated drinks.  Quit smoking. Smoking and other tobacco use can lead to a long-term (chronic) cough that strains the pelvic floor muscles. Smoking may also damage the bladder and urethra. Talk with your provider about treatments or methods you can use to quit smoking.  If drinking large amounts of fluid makes you have symptoms, you may be advised to limit your fluid intake. You may also be advised to drink most of your fluids during the day and to limit fluids at night.  If you re worried about urine leakage or accidents, you may wear absorbent pads to catch urine. Change the pads often. This helps reduce discomfort. It may also reduce the risk of skin or bladder infections.    Follow-up care  Follow up with your healthcare provider, or as directed. It may take some to find the right treatment for your problem. But healthy lifestyle changes can be made right away. These include such things as exercising on a regular basis, eating a healthy diet, losing weight (if needed), and quitting smoking. Your treatment plan may include special therapies or medicines. Certain procedures or surgery may also be options. Talk about any questions you have with your provider.   When to seek medical advice  Call the healthcare provider right away if any of these occur:  Fever of 100.4 F (38 C) or higher, or as directed by your provider  Bladder pain or fullness  Belly swelling  Nausea or vomiting  Back pain  Weakness, dizziness, or fainting  [employer] last reviewed this educational content on 1/1/2020 2000-2021 [employer]. All rights reserved. This information is not intended as a substitute for professional medical care. Always follow your healthcare professional's instructions.        Your Health Risk Assessment indicates you feel you are not in good  emotional health.    Recreation   Recreation is not limited to sports and team events. It includes any activity that provides relaxation, interest, enjoyment, and exercise. Recreation provides an outlet for physical, mental, and social energy. It can give a sense of worth and achievement. It can help you stay healthy.    Mental Exercise and Social Involvement  Mental and emotional health is as important as physical health. Keep in touch with friends and family. Stay as active as possible. Continue to learn and challenge yourself.   Things you can do to stay mentally active are:  Learn something new, like a foreign language or musical instrument.   Play SCRABBLE or do crossword puzzles. If you cannot find people to play these games with you at home, you can play them with others on your computer through the Internet.   Join a games club--anything from card games to chess or checkers or lawn bowling.   Start a new hobby.   Go back to school.   Volunteer.   Read.   Keep up with world events.     Patient Education   Personalized Prevention Plan  You are due for the preventive services outlined below.  Your care team is available to assist you in scheduling these services.  If you have already completed any of these items, please share that information with your care team to update in your medical record.  Health Maintenance Due   Topic Date Due    ANNUAL REVIEW OF HM ORDERS  Never done    Depression Action Plan  Never done    Zoster (Shingles) Vaccine (1 of 2) Never done    LUNG CANCER SCREENING  Never done    Diptheria Tetanus Pertussis (DTAP/TDAP/TD) Vaccine (2 - Td or Tdap) 01/12/2019    Flu Vaccine (1) 09/01/2021    Depression Assessment  10/28/2021    FALL RISK ASSESSMENT  04/28/2022     Your Health Risk Assessment indicates you feel you are not in good health    A healthy lifestyle helps keep the body fit and the mind alert. It helps protect you from disease, helps you fight disease, and helps prevent chronic  disease (disease that doesn't go away) from getting worse. This is important as you get older and begin to notice twinges in muscles and joints and a decline in the strength and stamina you once took for granted. A healthy lifestyle includes good healthcare, good nutrition, weight control, recreation, and regular exercise. Avoid harmful substances and do what you can to keep safe. Another part of a healthy lifestyle is stay mentally active and socially involved.    Good healthcare   Have a wellness visit every year.   If you have new symptoms, let us know right away. Don't wait until the next checkup.   Take medicines exactly as prescribed and keep your medicines in a safe place. Tell us if your medicine causes problems.   Healthy diet and weight control   Eat 3 or 4 small, nutritious, low-fat, high-fiber meals a day. Include a variety of fruits, vegetables, and whole-grain foods.   Make sure you get enough calcium in your diet. Calcium, vitamin D, and exercise help prevent osteoporosis (bone thinning).   If you live alone, try eating with others when you can. That way you get a good meal and have company while you eat it.   Try to keep a healthy weight. If you eat more calories than your body uses for energy, it will be stored as fat and you will gain weight.     Recreation   Recreation is not limited to sports and team events. It includes any activity that provides relaxation, interest, enjoyment, and exercise. Recreation provides an outlet for physical, mental, and social energy. It can give a sense of worth and achievement. It can help you stay healthy.    Mental Exercise and Social Involvement  Mental and emotional health is as important as physical health. Keep in touch with friends and family. Stay as active as possible. Continue to learn and challenge yourself.   Things you can do to stay mentally active are:  Learn something new, like a foreign language or musical instrument.   Play SCRABBLE or do crossword  puzzles. If you cannot find people to play these games with you at home, you can play them with others on your computer through the Internet.   Join a games club--anything from card games to chess or checkers or lawn bowling.   Start a new hobby.   Go back to school.   Volunteer.   Read.   Keep up with world events.    Exercise for a Healthier Heart  You may wonder how you can improve the health of your heart. If you re thinking about exercise, you re on the right track. You don t need to become an athlete. But you do need a certain amount of brisk exercise to help strengthen your heart. If you have been diagnosed with a heart condition, your healthcare provider may advise exercise to help stabilize your condition. To help make exercise a habit, choose safe, fun activities.      Exercise with a friend. When activity is fun, you're more likely to stick with it.   Before you start  Check with your healthcare provider before starting an exercise program. This is especially important if you have not been active for a while. It's also important if you have a long-term (chronic) health problem such as heart disease, diabetes, or obesity. Or if you are at high risk for having these problems.   Why exercise?  Exercising regularly offers many healthy rewards. It can help you do all of the following:   Improve your blood cholesterol level to help prevent further heart trouble  Lower your blood pressure to help prevent a stroke or heart attack  Control diabetes, or reduce your risk of getting this disease  Improve your heart and lung function  Reach and stay at a healthy weight  Make your muscles stronger so you can stay active  Prevent falls and fractures by slowing the loss of bone mass (osteoporosis)  Manage stress better  Reduce your blood pressure  Improve your sense of self and your body image  Exercise tips    Ease into your routine. Set small goals. Then build on them. If you are not sure what your activity level  should be, talk with your healthcare provider first before starting an exercise routine.  Exercise on most days. Aim for a total of 150 minutes (2 hours and 30 minutes) or more of moderate-intensity aerobic activity each week. Or 75 minutes (1 hour and 15 minutes) or more of vigorous-intensity aerobic activity each week. Or try for a combination of both. Moderate activity means that you breathe heavier and your heart rate increases but you can still talk. Think about doing 40 minutes of moderate exercise, 3 to 4 times a week. For best results, activity should last for about 40 minutes to lower blood pressure and cholesterol. It's OK to work up to the 40-minute period over time. Examples of moderate-intensity activity are walking 1 mile in 15 minutes. Or doing 30 to 45 minutes of yard work.  Step up your daily activity level.  Along with your exercise program, try being more active the whole day. Walk instead of drive. Or park further away so that you take more steps each day. Do more household tasks or yard work. You may not be able to meet the advised mount of physical activity. But doing some moderate- or vigorous-intensity aerobic activity can help reduce your risk for heart disease. Your healthcare provider can help you figure out what is best for you.  Choose 1 or more activities you enjoy.  Walking is one of the easiest things you can do. You can also try swimming, riding a bike, dancing, or taking an exercise class.    When to call your healthcare provider  Call your healthcare provider if you have any of these:   Chest pain or feel dizzy or lightheaded  Burning, tightness, pressure, or heaviness in your chest, neck, shoulders, back, or arms  Abnormal shortness of breath  More joint or muscle pain  A very fast or irregular heartbeat (palpitations)  [employer] last reviewed this educational content on 7/1/2019 2000-2021 [employer]. All rights reserved. This information is not intended as a  substitute for professional medical care. Always follow your healthcare professional's instructions.          Understanding USDA MyPlate  The USDA has guidelines to help you make healthy food choices. These are called MyPlate. MyPlate shows the food groups that make up healthy meals using the image of a place setting. Before you eat, think about the healthiest choices for what to put on your plate or in your cup or bowl. To learn more about building a healthy plate, visit www.choosemyplate.gov.    The food groups  Fruits. Any fruit or 100% fruit juice counts as part of the Fruit Group. Fruits may be fresh, canned, frozen, or dried, and may be whole, cut-up, or pureed. Make 1/2 of your plate fruits and vegetables.  Vegetables. Any vegetable or 100% vegetable juice counts as a member of the Vegetable Group. Vegetables may be fresh, frozen, canned, or dried. They can be served raw or cooked and may be whole, cut-up, or mashed. Make 1/2 of your plate fruits and vegetables.  Grains. All foods made from grains are part of the Grains Group. These include wheat, rice, oats, cornmeal, and barley. Grains are often used to make foods such as bread, pasta, oatmeal, cereal, tortillas, and grits. Grains should be no more than 1/4 of your plate. At least half of your grains should be whole grains.  Protein. This group includes meat, poultry, seafood, beans and peas, eggs, processed soy products (such as tofu), nuts (including nut butters), and seeds. Make protein choices no more than 1/4 of your plate. Meat and poultry choices should be lean or low fat.  Dairy. The Dairy Group includes all fluid milk products and foods made from milk that contain calcium, such as yogurt and cheese. (Foods that have little calcium, such as cream, butter, and cream cheese, are not part of this group.) Most dairy choices should be low-fat or fat-free.  Oils. Oils aren't a food group, but they do contain essential nutrients. However it's important to  watch your intake of oils. These are fats that are liquid at room temperature. They include canola, corn, olive, soybean, vegetable, and sunflower oil. Foods that are mainly oil include mayonnaise, certain salad dressings, and soft margarines. You likely already get your daily oil allowance from the foods you eat.  Things to limit  Eating healthy also means limiting these things in your diet:     Salt (sodium). Many processed foods have a lot of sodium. To keep sodium intake down, eat fresh vegetables, meats, poultry, and seafood when possible. Purchase low-sodium, reduced-sodium, or no-salt-added food products at the store. And don't add salt to your meals at home. Instead, season them with herbs and spices such as dill, oregano, cumin, and paprika. Or try adding flavor with lemon or lime zest and juice.  Saturated fat. Saturated fats are most often found in animal products such as beef, pork, and chicken. They are often solid at room temperature, such as butter. To reduce your saturated fat intake, choose leaner cuts of meat and poultry. And try healthier cooking methods such as grilling, broiling, roasting, or baking. For a simple lower-fat swap, use plain nonfat yogurt instead of mayonnaise when making potato salad or macaroni salad.  Added sugars. These are sugars added to foods. They are in foods such as ice cream, candy, soda, fruit drinks, sports drinks, energy drinks, cookies, pastries, jams, and syrups. Cut down on added sugars by sharing sweet treats with a family member or friend. You can also choose fruit for dessert, and drink water or other unsweetened beverages.     [employer] last reviewed this educational content on 6/1/2020 2000-2021 [employer]. All rights reserved. This information is not intended as a substitute for professional medical care. Always follow your healthcare professional's instructions.          Urinary Incontinence, Female (Adult)   Urinary incontinence means loss  of bladder control. This problem affects many women, especially as they get older. If you have incontinence, you may be embarrassed to ask for help. But know that this problem can be treated.   Types of Incontinence  There are different types of incontinence. Two of the main types are described here. You can have more than one type.   Stress incontinence. With this type, urine leaks when pressure (stress) is put on the bladder. This may happen when you cough, sneeze, or laugh. Stress incontinence most often occurs because the pelvic floor muscles that support the bladder and urethra are weak. This can happen after pregnancy and vaginal childbirth or a hysterectomy. It can also be due to excess body weight or hormone changes.  Urge incontinence (also called overactive bladder). With this type, a sudden urge to urinate is felt often. This may happen even though there may not be much urine in the bladder. The need to urinate often during the night is common. Urge incontinence most often occurs because of bladder spasms. This may be due to bladder irritation or infection. Damage to bladder nerves or pelvic muscles, constipation, and certain medicines can also lead to urge incontinence.  Treatment depends on the cause. Further evaluation is needed to find the type you have. This will likely include an exam and certain tests. Based on the results, you and your healthcare provider can then plan treatment. Until a diagnosis is made, the home care tips below can help ease symptoms.   Home care  Do pelvic floor muscle exercises, if they are prescribed. The pelvic floor muscles help support the bladder and urethra. Many women find that their symptoms improve when doing special exercises that strengthen these muscles. To do the exercises, contract the muscles you would use to stop your stream of urine. But do this when you re not urinating. Hold for 10 seconds, then relax. Repeat 10 to 20 times in a row, at least 3 times a day.  Your healthcare provider may give you other instructions for how to do the exercises and how often.  Keep a bladder diary. This helps track how often and how much you urinate over a set period of time. Bring this diary with you to your next visit with the provider. The information can help your provider learn more about your bladder problem.  Lose weight, if advised to by your provider. Extra weight puts pressure on the bladder. Your provider can help you create a weight-loss plan that s right for you. This may include exercising more and making certain diet changes.  Don't have foods and drinks that may irritate the bladder. These can include alcohol and caffeinated drinks.  Quit smoking. Smoking and other tobacco use can lead to a long-term (chronic) cough that strains the pelvic floor muscles. Smoking may also damage the bladder and urethra. Talk with your provider about treatments or methods you can use to quit smoking.  If drinking large amounts of fluid makes you have symptoms, you may be advised to limit your fluid intake. You may also be advised to drink most of your fluids during the day and to limit fluids at night.  If you re worried about urine leakage or accidents, you may wear absorbent pads to catch urine. Change the pads often. This helps reduce discomfort. It may also reduce the risk of skin or bladder infections.    Follow-up care  Follow up with your healthcare provider, or as directed. It may take some to find the right treatment for your problem. But healthy lifestyle changes can be made right away. These include such things as exercising on a regular basis, eating a healthy diet, losing weight (if needed), and quitting smoking. Your treatment plan may include special therapies or medicines. Certain procedures or surgery may also be options. Talk about any questions you have with your provider.   When to seek medical advice  Call the healthcare provider right away if any of these occur:  Fever  of 100.4 F (38 C) or higher, or as directed by your provider  Bladder pain or fullness  Belly swelling  Nausea or vomiting  Back pain  Weakness, dizziness, or fainting  [employer] last reviewed this educational content on 1/1/2020 2000-2021 [employer]. All rights reserved. This information is not intended as a substitute for professional medical care. Always follow your healthcare professional's instructions.        Your Health Risk Assessment indicates you feel you are not in good emotional health.    Recreation   Recreation is not limited to sports and team events. It includes any activity that provides relaxation, interest, enjoyment, and exercise. Recreation provides an outlet for physical, mental, and social energy. It can give a sense of worth and achievement. It can help you stay healthy.    Mental Exercise and Social Involvement  Mental and emotional health is as important as physical health. Keep in touch with friends and family. Stay as active as possible. Continue to learn and challenge yourself.   Things you can do to stay mentally active are:  Learn something new, like a foreign language or musical instrument.   Play SCRABBLE or do crossword puzzles. If you cannot find people to play these games with you at home, you can play them with others on your computer through the Internet.   Join a games club--anything from card games to chess or checkers or lawn bowling.   Start a new hobby.   Go back to school.   Volunteer.   Read.   Keep up with world events.       The Benefits of Living Tobacco-Free  What do you want to improve in your life by quitting tobacco? Whether you smoke cigarettes, e-cigarettes, cigars, or a pipe, or use smokeless tobacco, there are many reasons to quit. Check off some reasons you want to be tobacco-free.  Health benefits  ___  I want to breathe without coughing or feeling short of breath.  ___  I want to reduce my risk for lung cancer, oral cancer, heart disease,  bone problems such as osteoporosis and fractures, and chronic lung disease. Or reduce my risk for a serious lung injury called EVALI that may happen from vaping or using e-cigarettes.  ___  I want to have fewer wrinkles and softer skin.  ___  I want to improve my sense of taste and smell.  ___  For pregnant women: I want to reduce my risk for a miscarriage, stillbirth,  birth, or a low-birth-weight baby.  Personal benefits  ___  I want to be able to walk, go up stairs, and exercise without becoming out of breath.  ___  I want to feel more in control of my life.  ___  I want to have better-smelling hair, clothes, home, and car.  ___  I want to save time by not having to take smoke breaks, buy tobacco products, or hunt for a light.  ___  I want to have whiter teeth and fresher breath.  Family benefits  ___  I want to reduce my child's respiratory tract infections.  ___  I want to set a healthy example for my child.  ___  I want to have the energy needed to play with my children and not become out-of-breath  ___  I want to reduce my family s cancer risk.  Financial benefits  ___  I want to save hundreds of dollars each year that would be spent on tobacco products.  ___  I want to save money on medical bills.  ___  I want to save money on life, health, and car insurance premiums.  How much do you spend?  A tobacco habit is expensive. Do you know how much you spend on tobacco each year? Fill in the blanks below to find out:  A. How much do you pay for 1 pack of cigarettes, 1 cigar, 1 pouch of pipe tobacco, or 1 can of smokeless tobacco? $________  B. How many packs, cigars, pouches, or cans do you use each day? _______________  C. Multiply answer A with answer B:___________________  D. Multiply answer C with 365: $___________________. This is your yearly cost of using tobacco.  Besides the cost of buying tobacco, there are other costs. These include:  Costs of cleaning clothing, furniture, and car  Replacement  costs for clothing and furniture  Medical costs for tobacco-related illnesses  Missed days of work because of illness  Higher health, life, and car insurance premiums  Get help  QuitNow, www.cdc.gov/tobacco/quit_smoking/, 800-QUIT-NOW (333-980-9478).  QuitLine, www.smokefree.gov, 877-44U-QUIT (300-148-7742).    [employer] last reviewed this educational content on 4/1/2020 2000-2021 [employer]. All rights reserved. This information is not intended as a substitute for professional medical care. Always follow your healthcare professional's instructions.

## 2022-04-29 NOTE — LETTER
April 30, 2022      [address]  [address] [place] [id number]        Dear Luz,    Here is a copy of your recent laboratory results.  Your blood counts were normal.  Your kidney test and blood sugar test are normal.  Your liver tests are normal.  Your cholesterol numbers are very good.    Michele Deleon MD          Resulted Orders   Basic metabolic panel   Result Value Ref Range    Sodium 143 136 - 145 mmol/L    Potassium 4.4 3.5 - 5.0 mmol/L    Chloride 107 98 - 107 mmol/L    Carbon Dioxide (CO2) 24 22 - 31 mmol/L    Anion Gap 12 5 - 18 mmol/L    Urea Nitrogen 17 8 - 28 mg/dL    Creatinine 0.76 0.60 - 1.10 mg/dL    Calcium 9.8 8.5 - 10.5 mg/dL    Glucose 96 70 - 125 mg/dL    GFR Estimate 82 >60 mL/min/1.73m2      Comment:      Effective December 21, 2021 eGFRcr in adults is calculated using the 2021 CKD-EPI creatinine equation which includes age and gender (Iris hancock al., [place], DOI: 10.1056/JLFTmb0843610)   Hepatic function panel   Result Value Ref Range    Bilirubin Total 0.8 0.0 - 1.0 mg/dL    Bilirubin Direct 0.3 <=0.5 mg/dL    Protein Total 6.9 6.0 - 8.0 g/dL    Albumin 4.1 3.5 - 5.0 g/dL    Alkaline Phosphatase 105 45 - 120 U/L    AST 13 0 - 40 U/L    ALT 11 0 - 45 U/L   CBC with platelets   Result Value Ref Range    WBC Count 6.6 4.0 - 11.0 10e3/uL    RBC Count 4.69 3.80 - 5.20 10e6/uL    Hemoglobin 14.5 11.7 - 15.7 g/dL    Hematocrit 44.1 35.0 - 47.0 %    MCV 94 78 - 100 fL    MCH 30.9 26.5 - 33.0 pg    MCHC 32.9 31.5 - 36.5 g/dL    RDW 12.2 10.0 - 15.0 %    Platelet Count 227 150 - 450 10e3/uL   Lipid panel reflex to direct LDL Fasting   Result Value Ref Range    Cholesterol 169 <=199 mg/dL    Triglycerides 136 <=149 mg/dL    Direct Measure HDL 60 >=50 mg/dL      Comment:      HDL Cholesterol Reference Range:     0-2 years:   No reference ranges established for patients under 2 years old  at [facility] for lipid analytes.    2-8 years:  Greater than 45 mg/dL     18 years and older:    Female: Greater than or equal to 50 mg/dL   Male:   Greater than or equal to 40 mg/dL    LDL Cholesterol Calculated 82 <=129 mg/dL    Patient Fasting > 8hrs? Yes        If you have any questions or concerns, please call the clinic at the number listed above.       Sincerely,      Michele Deleon MD

## 2022-04-30 LAB — DEPRECATED CALCIDIOL+CALCIFEROL SERPL-MC: 23 UG/L (ref 20–75)

## 2022-04-30 ASSESSMENT — PATIENT HEALTH QUESTIONNAIRE - PHQ9: SUM OF ALL RESPONSES TO PHQ QUESTIONS 1-9: 16

## 2022-07-01 DIAGNOSIS — K21.9 GASTRO-ESOPHAGEAL REFLUX DISEASE WITHOUT ESOPHAGITIS: ICD-10-CM

## 2022-07-01 RX ORDER — LANSOPRAZOLE 30 MG/1
CAPSULE, DELAYED RELEASE ORAL
Qty: 90 CAPSULE | Refills: 3 | Status: SHIPPED | OUTPATIENT
Start: 2022-07-01 | End: 2023-06-28

## 2022-07-01 NOTE — TELEPHONE ENCOUNTER
"Routing refill request to provider for review/approval because:  Medication is reported/historical    Last Written Prescription Date:    Last Fill Quantity: ,  # refills:    Last office visit provider:  4/29/22     Requested Prescriptions   Pending Prescriptions Disp Refills     LANsoprazole (PREVACID) 30 MG DR capsule [Pharmacy Med Name: LANSOPRAZOLE DR 30 MG CAPSULE] 90 capsule 3     Sig: TAKE 1 CAPSULE BY MOUTH EVERY DAY       PPI Protocol Passed - 7/1/2022 12:48 AM        Passed - Not on Clopidogrel (unless Pantoprazole ordered)        Passed - No diagnosis of osteoporosis on record        Passed - Recent (12 mo) or future (30 days) visit within the authorizing provider's specialty     Patient has had an office visit with the authorizing provider or a provider within the authorizing providers department within the previous 12 mos or has a future within next 30 days. See \"Patient Info\" tab in inbasket, or \"Choose Columns\" in Meds & Orders section of the refill encounter.              Passed - Medication is active on med list        Passed - Patient is age 18 or older        Passed - No active pregnacy on record        Passed - No positive pregnancy test in past 12 months             Noemí Davis RN 07/01/22 10:32 AM  "

## 2022-07-02 DIAGNOSIS — E78.5 HYPERLIPIDEMIA, UNSPECIFIED: ICD-10-CM

## 2022-07-02 NOTE — TELEPHONE ENCOUNTER
"Routing refill request to provider for review/approval because:  Medication is reported/historical    Last Written Prescription Date:  ?  Last Fill Quantity: ?,  # refills: ?   Last office visit provider:  4/29/22     Requested Prescriptions   Pending Prescriptions Disp Refills     atorvastatin (LIPITOR) 10 MG tablet [Pharmacy Med Name: ATORVASTATIN 10 MG TABLET] 90 tablet 3     Sig: TAKE 1 TABLET BY MOUTH EVERY DAY       Statins Protocol Passed - 7/2/2022 12:15 AM        Passed - LDL on file in past 12 months     Recent Labs   Lab Test 04/29/22  1015   LDL 82             Passed - No abnormal creatine kinase in past 12 months     Recent Labs   Lab Test 09/23/20  1211   CKT 42                Passed - Recent (12 mo) or future (30 days) visit within the authorizing provider's specialty     Patient has had an office visit with the authorizing provider or a provider within the authorizing providers department within the previous 12 mos or has a future within next 30 days. See \"Patient Info\" tab in inbasket, or \"Choose Columns\" in Meds & Orders section of the refill encounter.              Passed - Medication is active on med list        Passed - Patient is age 18 or older        Passed - No active pregnancy on record        Passed - No positive pregnancy test in past 12 months             Lu Posadas 07/02/22 4:28 PM  "

## 2022-07-04 RX ORDER — ATORVASTATIN CALCIUM 10 MG/1
TABLET, FILM COATED ORAL
Qty: 90 TABLET | Refills: 3 | Status: SHIPPED | OUTPATIENT
Start: 2022-07-04 | End: 2023-06-28

## 2022-07-20 DIAGNOSIS — F41.9 ANXIETY: ICD-10-CM

## 2022-07-20 RX ORDER — LORAZEPAM 1 MG/1
TABLET ORAL
Qty: 30 TABLET | Refills: 0 | Status: SHIPPED | OUTPATIENT
Start: 2022-07-20 | End: 2023-06-21

## 2022-07-26 ENCOUNTER — ANCILLARY PROCEDURE (OUTPATIENT)
Dept: MAMMOGRAPHY | Facility: CLINIC | Age: 72
End: 2022-07-26
Attending: FAMILY MEDICINE
Payer: COMMERCIAL

## 2022-07-26 DIAGNOSIS — Z12.31 VISIT FOR SCREENING MAMMOGRAM: ICD-10-CM

## 2022-07-26 PROCEDURE — 77067 SCR MAMMO BI INCL CAD: CPT

## 2022-08-31 ENCOUNTER — TRANSFERRED RECORDS (OUTPATIENT)
Dept: HEALTH INFORMATION MANAGEMENT | Facility: CLINIC | Age: 72
End: 2022-08-31

## 2023-02-03 ENCOUNTER — APPOINTMENT (OUTPATIENT)
Dept: RADIOLOGY | Facility: HOSPITAL | Age: 73
End: 2023-02-03
Attending: STUDENT IN AN ORGANIZED HEALTH CARE EDUCATION/TRAINING PROGRAM
Payer: COMMERCIAL

## 2023-02-03 ENCOUNTER — HOSPITAL ENCOUNTER (EMERGENCY)
Facility: HOSPITAL | Age: 73
Discharge: HOME | End: 2023-02-03
Admitting: EMERGENCY MEDICINE
Payer: COMMERCIAL

## 2023-02-03 VITALS
RESPIRATION RATE: 20 BRPM | HEIGHT: 66 IN | OXYGEN SATURATION: 100 % | DIASTOLIC BLOOD PRESSURE: 74 MMHG | SYSTOLIC BLOOD PRESSURE: 132 MMHG | BODY MASS INDEX: 25.07 KG/M2 | HEART RATE: 70 BPM | TEMPERATURE: 97.8 F | WEIGHT: 156 LBS

## 2023-02-03 DIAGNOSIS — S42.209A PROXIMAL HUMERUS FRACTURE: ICD-10-CM

## 2023-02-03 PROCEDURE — 99284 EMERGENCY DEPT VISIT MOD MDM: CPT | Mod: 25

## 2023-02-03 PROCEDURE — 23600 CLTX PROX HUMRL FX W/O MNPJ: CPT | Mod: RT

## 2023-02-03 PROCEDURE — 73060 X-RAY EXAM OF HUMERUS: CPT | Mod: RT

## 2023-02-03 PROCEDURE — 73030 X-RAY EXAM OF SHOULDER: CPT | Mod: RT

## 2023-02-03 PROCEDURE — 250N000013 HC RX MED GY IP 250 OP 250 PS 637: Performed by: EMERGENCY MEDICINE

## 2023-02-03 RX ORDER — OXYCODONE HYDROCHLORIDE 5 MG/1
5 TABLET ORAL EVERY 6 HOURS PRN
Qty: 10 TABLET | Refills: 0 | Status: SHIPPED | OUTPATIENT
Start: 2023-02-03 | End: 2023-06-21

## 2023-02-03 RX ORDER — ACETAMINOPHEN 325 MG/1
650 TABLET ORAL ONCE
Status: COMPLETED | OUTPATIENT
Start: 2023-02-03 | End: 2023-02-03

## 2023-02-03 RX ORDER — IBUPROFEN 600 MG/1
600 TABLET, FILM COATED ORAL ONCE
Status: COMPLETED | OUTPATIENT
Start: 2023-02-03 | End: 2023-02-03

## 2023-02-03 RX ADMIN — IBUPROFEN 600 MG: 600 TABLET, FILM COATED ORAL at 09:44

## 2023-02-03 RX ADMIN — ACETAMINOPHEN 650 MG: 325 TABLET ORAL at 09:45

## 2023-02-03 ASSESSMENT — ENCOUNTER SYMPTOMS
NUMBNESS: 0
HEADACHES: 0
SHORTNESS OF BREATH: 0
NAUSEA: 0
VOMITING: 0

## 2023-02-03 ASSESSMENT — ACTIVITIES OF DAILY LIVING (ADL): ADLS_ACUITY_SCORE: 35

## 2023-02-03 NOTE — ED PROVIDER NOTES
EMERGENCY DEPARTMENT ENCOUNTER      NAME: Max RAYMOND Route  AGE: 72 year old female  YOB: 1950  MRN: [id number]  EVALUATION DATE & TIME: No admission date for patient encounter.    PCP: Michele Deleon    ED PROVIDER: Elyssa Chisholm PA-C      Chief Complaint   Patient presents with     Fall     Shoulder Injury         FINAL IMPRESSION:  1. Proximal humerus fracture          MEDICAL DECISION MAKING:    Pertinent Labs & Imaging studies reviewed. (See chart for details)  72 year old female presents to the Emergency Department for evaluation of fall, shoulder pain.  Today while the patient was taking out the trash she slipped and fell on the ice onto her right shoulder.  She was able to get herself up off the ground however, was having significant pain in her shoulder/upper arm and was concerned about fracture and presented to the emergency department.  On my evaluation, the patient was vitally stable and well-appearing.  Examination with tenderness to palpation of the right shoulder/upper humerus.  She had limited range of motion due to pain.  No tenderness to palpation of the elbow or wrist joint.  She had 2+ radial pulses bilaterally.  Normal sensation and strength in the bilateral upper extremities.  No neck tenderness.  GCS 15.    The patient had a purely mechanical fall and did not have any chest pain, shortness of breath or syncope prior to falling and I do not feel EKG or labs are indicated at this time.  The patient did not hit her head, has a GCS of 15 and has no neck pain or tenderness on exam, no headache, no vision changes and per [facility] head CT and C-spine rules I do not feel CT of the head or cervical spine is indicated at this time.  Patient has tenderness of the shoulder and upper arm and x-rays of the shoulder and humerus were ordered in triage and I feel are appropriate.  X-rays of the humerus showed proximal humeral fracture extending into the neck of the humerus with mild  displacement.  The patient was given ibuprofen and Tylenol in a sling with significant improvement of her pain.  Distal CMS was intact and with good pain control I do feel comfortable sending the patient home to follow-up outpatient with orthopedics.  I gave her the number for [facility] to call today to schedule an appointment.  She will continue to take Tylenol thaws milligrams and ibuprofen 600 mg every 6 hours as needed for pain.  I will also give her a shot small amount of oxycodone for severe pain and prescription was given.  Return precautions were discussed and all questions were answered the best my ability.  She was discharged from the emergency department in stable condition.      Medical Decision Making    History:    Supplemental history from: Documented in chart, if applicable    External Record(s) reviewed: Documented in chart, if applicable.    Work Up:    Chart documentation includes differential considered and any EKGs or imaging independently interpreted by provider, where specified.    In additional to work up documented, I considered the following work up: Documented in chart, if applicable.    External consultation:    Discussion of management with another provider: Documented in chart, if applicable    Complicating factors:    Care impacted by chronic illness: N/A    Care affected by social determinants of health: N/A    Disposition considerations: Discharge. I prescribed additional prescription strength medication(s) as charted. See documentation for any additional details.      ED COURSE:  9:25 AM I met with the patient, obtained history, performed an initial exam, and discussed options and plan for diagnostics and treatment here in the ED.  11:15 AM Patient discharged after being provided with extensive anticipatory guidance and given return precautions, importance of PCP follow-up emphasized.    At the conclusion of the encounter I discussed the results of all of the tests and the disposition.  The questions were answered. The patient or family acknowledged understanding and was agreeable with the care plan.     MEDICATIONS GIVEN IN THE EMERGENCY:  Medications   acetaminophen (TYLENOL) tablet 650 mg (650 mg Oral Given 2/3/23 0188)   ibuprofen (ADVIL/MOTRIN) tablet 600 mg (600 mg Oral Given 2/3/23 1802)       NEW PRESCRIPTIONS STARTED AT TODAY'S ER VISIT  Discharge Medication List as of 2/3/2023 10:45 AM      START taking these medications    Details   oxyCODONE (ROXICODONE) 5 MG tablet Take 1 tablet (5 mg) by mouth every 6 hours as needed for pain, Disp-10 tablet, R-0, Local Print                  =================================================================    HPI:    Patient information was obtained from: Patient    Use of Interpretor: N/A       Max Thompson is a 72 year old female with a pertinent history of anxiety, moderate major depression, hyperlipidemia, and osteopenia who presents to this ED via walk in for evaluation of a fall and shoulder injury.    The patient reports that just prior to ED arrival, she was taking the garbage outside when she slipped on the ice and landed on her right arm. Endorses associated pain on the side of her arm near her shoulder. No numbness or tingling in her arm or hand. No hip or knee injury. She did not hit her head. She was able to stand after the injury and has been ambulating normally since. The patient is not on any blood thinning medication.     The patient denies chest pain, shortness of breath, syncope, headache, vision changes, nausea, vomiting, or any other complaints at this time.      REVIEW OF SYSTEMS:  Review of Systems   Eyes: Negative for visual disturbance.   Respiratory: Negative for shortness of breath.    Cardiovascular: Negative for chest pain.   Gastrointestinal: Negative for nausea and vomiting.   Musculoskeletal:        Positive for right arm and shoulder pain.   Neurological: Negative for syncope, numbness and headaches.        Negative  for tingling.   All other systems reviewed and are negative.         PAST MEDICAL HISTORY:  Past Medical History:   Diagnosis Date     Breast cyst 2013       PAST SURGICAL HISTORY:  History reviewed. No pertinent surgical history.        CURRENT MEDICATIONS:    No current facility-administered medications for this encounter.    Current Outpatient Medications:      oxyCODONE (ROXICODONE) 5 MG tablet, Take 1 tablet (5 mg) by mouth every 6 hours as needed for pain, Disp: 10 tablet, Rfl: 0     atorvastatin (LIPITOR) 10 MG tablet, TAKE 1 TABLET BY MOUTH EVERY DAY, Disp: 90 tablet, Rfl: 3     LANsoprazole (PREVACID) 30 MG DR capsule, TAKE 1 CAPSULE BY MOUTH EVERY DAY, Disp: 90 capsule, Rfl: 3     LORazepam (ATIVAN) 1 MG tablet, TAKE 1 TABLET BY MOUTH DAILY AS NEEDED FOR ANXIETY, Disp: 30 tablet, Rfl: 0      ALLERGIES:  No Known Allergies    FAMILY HISTORY:  Family History   Problem Relation Age of Onset     Cancer Son      No Known Problems Mother      No Known Problems Father      No Known Problems Sister      No Known Problems Daughter      No Known Problems Maternal Grandmother      No Known Problems Maternal Grandfather      No Known Problems Paternal Grandmother      No Known Problems Paternal Grandfather      No Known Problems Maternal Aunt      No Known Problems Paternal Aunt      Hereditary Breast and Ovarian Cancer Syndrome No family hx of      Breast Cancer No family hx of      Cancer No family hx of      Colon Cancer No family hx of      Endometrial Cancer No family hx of      Ovarian Cancer No family hx of        SOCIAL HISTORY:   Social History     Socioeconomic History     Marital status:    Tobacco Use     Smoking status: Light Smoker     Packs/day: 0.50     Types: Cigarettes     Smokeless tobacco: Never     Tobacco comments:     quit 5/2020       VITALS:  Patient Vitals for the past 24 hrs:   BP Temp Temp src Pulse Resp SpO2 Height Weight   02/03/23 1032 132/74 -- -- 70 20 100 % -- --   02/03/23  "1031 132/74 -- -- 72 -- 100 % -- --   02/03/23 0919 (!) 129/93 97.8  F (36.6  C) Temporal 64 20 99 % 1.676 m (5' 6\") 70.8 kg (156 lb)       PHYSICAL EXAM    Constitutional: Well developed, Well nourished, NAD  HENT: Normocephalic, Atraumatic, Bilateral external ears normal, mask in place.  Neck: Normal range of motion, No tenderness, Supple, No stridor.  Eyes: PERRL, EOMI, Conjunctiva normal, No discharge.   Respiratory: Normal breath sounds, No respiratory distress, No wheezing, Speaks full sentences easily. No cough.  Cardiovascular: Normal heart rate, Regular rhythm, No murmurs, No rubs, No gallops.   Musculoskeletal: Tenderness to palpation of the right shoulder joint/upper humerus with limited range of motion due to pain.  2+ radial pulses bilaterally.  No obvious deformity.  No tenderness to palpation of the right elbow or wrist.  No edema. No cyanosis, No clubbing. Good range of motion in all major joints. No tenderness to palpation or major deformities noted. No tenderness of the CTLS spine.   Integument: Warm, Dry, No erythema, No rash. No petechiae.  Neurologic: GCS 15.  5 out of 5 strength and sensation in upper extremities.  Alert & oriented x 3, Normal motor function, Normal sensory function, No focal deficits noted. Normal gait.  Psychiatric: Affect normal, Judgment normal, Mood normal. Cooperative.    LAB:  All pertinent labs reviewed and interpreted.  No results found for this or any previous visit (from the past 24 hour(s)).      RADIOLOGY:  Reviewed all pertinent imaging. Please see official radiology report.  Humerus XR, G/E 2 views, right   Preliminary Result   IMPRESSION: Mildly displaced right proximal humerus fracture involves the humeral neck and possibly extends to the greater tuberosity. Mid to distal right humerus intact. Anatomic alignment right shoulder. Moderate acromioclavicular and mild glenohumeral    joint osteoarthritis. Type II acromion. Visualized right lung clear. " Atherosclerotic vascular disease aortic knob. Degenerative change thoracic spine.      NOTE: ABNORMAL REPORT      THE DICTATION ABOVE DESCRIBES AN ABNORMALITY FOR WHICH FOLLOW-UP IS NEEDED.       XR Shoulder Right G/E 3 Views   Preliminary Result   IMPRESSION: Mildly displaced right proximal humerus fracture involves the humeral neck and possibly extends to the greater tuberosity. Mid to distal right humerus intact. Anatomic alignment right shoulder. Moderate acromioclavicular and mild glenohumeral    joint osteoarthritis. Type II acromion. Visualized right lung clear. Atherosclerotic vascular disease aortic knob. Degenerative change thoracic spine.      NOTE: ABNORMAL REPORT      THE DICTATION ABOVE DESCRIBES AN ABNORMALITY FOR WHICH FOLLOW-UP IS NEEDED.           PROCEDURES:   None      I, Kathryn Burnett, am serving as a scribe to document services personally performed by Elyssa Chisholm PA-C based on my observation and the provider's statements to me. I, Elyssa Chisholm PA-C attest that Kathryn Burnett is acting in a scribe capacity, has observed my performance of the services and has documented them in accordance with my direction.    Elyssa Chisholm PA-C  Emergency Medicine  [facility]  2/3/2023     Elyssa Chisholm PA-C  02/03/23 0202

## 2023-02-03 NOTE — DISCHARGE INSTRUCTIONS
You were seen and evaluated here in the ED for shoulder/arm pain after falling. Your x-ray did show a mildly displaced proximal humeral fracture.  He had improvement of your pain with sling, ibuprofen and Tylenol here and I will send you home with a prescription for a few tablets of oxycodone to help with severe pain.    I recommend follow-up with [facility] and to call them today to get scheduled.    Return to the emergency department for any significant worsening pain, inability to move your forearm/hand, loss of blood flow, loss of sensation or any other concerning symptoms.

## 2023-02-03 NOTE — ED TRIAGE NOTES
The pt was taking out her trash this am and she slipped and fell on ice and hurt her right shoulder, pt is holding up her arm at this time.CMS intact, unable to lift the arm.

## 2023-02-08 ENCOUNTER — TRANSFERRED RECORDS (OUTPATIENT)
Dept: HEALTH INFORMATION MANAGEMENT | Facility: CLINIC | Age: 73
End: 2023-02-08

## 2023-02-16 ENCOUNTER — TRANSFERRED RECORDS (OUTPATIENT)
Dept: HEALTH INFORMATION MANAGEMENT | Facility: CLINIC | Age: 73
End: 2023-02-16

## 2023-03-14 ENCOUNTER — TRANSFERRED RECORDS (OUTPATIENT)
Dept: HEALTH INFORMATION MANAGEMENT | Facility: CLINIC | Age: 73
End: 2023-03-14

## 2023-04-11 ENCOUNTER — TRANSFERRED RECORDS (OUTPATIENT)
Dept: HEALTH INFORMATION MANAGEMENT | Facility: CLINIC | Age: 73
End: 2023-04-11
Payer: COMMERCIAL

## 2023-04-12 ENCOUNTER — TRANSFERRED RECORDS (OUTPATIENT)
Dept: HEALTH INFORMATION MANAGEMENT | Facility: CLINIC | Age: 73
End: 2023-04-12
Payer: COMMERCIAL

## 2023-04-20 ENCOUNTER — PATIENT OUTREACH (OUTPATIENT)
Dept: CARE COORDINATION | Facility: CLINIC | Age: 73
End: 2023-04-20
Payer: COMMERCIAL

## 2023-06-21 ENCOUNTER — OFFICE VISIT (OUTPATIENT)
Dept: FAMILY MEDICINE | Facility: CLINIC | Age: 73
End: 2023-06-21
Payer: COMMERCIAL

## 2023-06-21 VITALS
SYSTOLIC BLOOD PRESSURE: 130 MMHG | BODY MASS INDEX: 25.26 KG/M2 | RESPIRATION RATE: 20 BRPM | HEART RATE: 85 BPM | TEMPERATURE: 97.6 F | DIASTOLIC BLOOD PRESSURE: 74 MMHG | OXYGEN SATURATION: 98 % | HEIGHT: 66 IN | WEIGHT: 157.2 LBS

## 2023-06-21 DIAGNOSIS — F41.9 ANXIETY: ICD-10-CM

## 2023-06-21 DIAGNOSIS — K21.9 GASTRO-ESOPHAGEAL REFLUX DISEASE WITHOUT ESOPHAGITIS: ICD-10-CM

## 2023-06-21 DIAGNOSIS — Z01.818 PREOPERATIVE EXAMINATION: ICD-10-CM

## 2023-06-21 DIAGNOSIS — E78.5 HYPERLIPIDEMIA, UNSPECIFIED HYPERLIPIDEMIA TYPE: ICD-10-CM

## 2023-06-21 DIAGNOSIS — Z00.00 ENCOUNTER FOR MEDICARE ANNUAL WELLNESS EXAM: Primary | ICD-10-CM

## 2023-06-21 DIAGNOSIS — F17.210 CIGARETTE NICOTINE DEPENDENCE WITHOUT COMPLICATION: ICD-10-CM

## 2023-06-21 DIAGNOSIS — E55.9 VITAMIN D DEFICIENCY, UNSPECIFIED: ICD-10-CM

## 2023-06-21 DIAGNOSIS — H26.9 CATARACT OF BOTH EYES, UNSPECIFIED CATARACT TYPE: ICD-10-CM

## 2023-06-21 DIAGNOSIS — Z78.0 POSTMENOPAUSAL STATUS: ICD-10-CM

## 2023-06-21 LAB
ALBUMIN SERPL BCG-MCNC: 4.3 G/DL (ref 3.5–5.2)
ALP SERPL-CCNC: 94 U/L (ref 35–104)
ALT SERPL W P-5'-P-CCNC: 15 U/L (ref 0–50)
ANION GAP SERPL CALCULATED.3IONS-SCNC: 10 MMOL/L (ref 7–15)
AST SERPL W P-5'-P-CCNC: 19 U/L (ref 0–45)
BILIRUB DIRECT SERPL-MCNC: <0.2 MG/DL (ref 0–0.3)
BILIRUB SERPL-MCNC: 0.5 MG/DL
BUN SERPL-MCNC: 15 MG/DL (ref 8–23)
CALCIUM SERPL-MCNC: 9.6 MG/DL (ref 8.8–10.2)
CHLORIDE SERPL-SCNC: 106 MMOL/L (ref 98–107)
CHOLEST SERPL-MCNC: 206 MG/DL
CREAT SERPL-MCNC: 0.75 MG/DL (ref 0.51–0.95)
DEPRECATED HCO3 PLAS-SCNC: 26 MMOL/L (ref 22–29)
ERYTHROCYTE [DISTWIDTH] IN BLOOD BY AUTOMATED COUNT: 12.6 % (ref 10–15)
GFR SERPL CREATININE-BSD FRML MDRD: 83 ML/MIN/1.73M2
GLUCOSE SERPL-MCNC: 88 MG/DL (ref 70–99)
HCT VFR BLD AUTO: 44.3 % (ref 35–47)
HDLC SERPL-MCNC: 66 MG/DL
HGB BLD-MCNC: 14.4 G/DL (ref 11.7–15.7)
LDLC SERPL CALC-MCNC: 103 MG/DL
MCH RBC QN AUTO: 30.7 PG (ref 26.5–33)
MCHC RBC AUTO-ENTMCNC: 32.5 G/DL (ref 31.5–36.5)
MCV RBC AUTO: 95 FL (ref 78–100)
NONHDLC SERPL-MCNC: 140 MG/DL
PLATELET # BLD AUTO: 229 10E3/UL (ref 150–450)
POTASSIUM SERPL-SCNC: 4.3 MMOL/L (ref 3.4–5.3)
PROT SERPL-MCNC: 7.3 G/DL (ref 6.4–8.3)
RBC # BLD AUTO: 4.69 10E6/UL (ref 3.8–5.2)
SODIUM SERPL-SCNC: 142 MMOL/L (ref 136–145)
TRIGL SERPL-MCNC: 184 MG/DL
WBC # BLD AUTO: 8.1 10E3/UL (ref 4–11)

## 2023-06-21 PROCEDURE — 36415 COLL VENOUS BLD VENIPUNCTURE: CPT | Performed by: FAMILY MEDICINE

## 2023-06-21 PROCEDURE — 99214 OFFICE O/P EST MOD 30 MIN: CPT | Mod: 25 | Performed by: FAMILY MEDICINE

## 2023-06-21 PROCEDURE — 80053 COMPREHEN METABOLIC PANEL: CPT | Performed by: FAMILY MEDICINE

## 2023-06-21 PROCEDURE — G0438 PPPS, INITIAL VISIT: HCPCS | Performed by: FAMILY MEDICINE

## 2023-06-21 PROCEDURE — 82306 VITAMIN D 25 HYDROXY: CPT | Performed by: FAMILY MEDICINE

## 2023-06-21 PROCEDURE — 85027 COMPLETE CBC AUTOMATED: CPT | Performed by: FAMILY MEDICINE

## 2023-06-21 PROCEDURE — 80061 LIPID PANEL: CPT | Performed by: FAMILY MEDICINE

## 2023-06-21 PROCEDURE — 82248 BILIRUBIN DIRECT: CPT | Performed by: FAMILY MEDICINE

## 2023-06-21 RX ORDER — LORAZEPAM 1 MG/1
TABLET ORAL
Qty: 30 TABLET | Refills: 0 | Status: SHIPPED | OUTPATIENT
Start: 2023-06-21 | End: 2024-09-17

## 2023-06-21 ASSESSMENT — ENCOUNTER SYMPTOMS
JOINT SWELLING: 1
COUGH: 0
DIZZINESS: 0
PARESTHESIAS: 0
SHORTNESS OF BREATH: 0
BREAST MASS: 0
PALPITATIONS: 0
CHILLS: 0
EYE PAIN: 0
HEARTBURN: 0
HEADACHES: 0
ARTHRALGIAS: 0
HEMATOCHEZIA: 0
DIARRHEA: 0
FREQUENCY: 0
DYSURIA: 0
CONSTIPATION: 0
HEMATURIA: 0
NAUSEA: 0
NERVOUS/ANXIOUS: 1
SORE THROAT: 0
FEVER: 0
ABDOMINAL PAIN: 0
WEAKNESS: 0
MYALGIAS: 1

## 2023-06-21 ASSESSMENT — PATIENT HEALTH QUESTIONNAIRE - PHQ9
SUM OF ALL RESPONSES TO PHQ QUESTIONS 1-9: 10
SUM OF ALL RESPONSES TO PHQ QUESTIONS 1-9: 10
10. IF YOU CHECKED OFF ANY PROBLEMS, HOW DIFFICULT HAVE THESE PROBLEMS MADE IT FOR YOU TO DO YOUR WORK, TAKE CARE OF THINGS AT HOME, OR GET ALONG WITH OTHER PEOPLE: SOMEWHAT DIFFICULT

## 2023-06-21 ASSESSMENT — ACTIVITIES OF DAILY LIVING (ADL): CURRENT_FUNCTION: NO ASSISTANCE NEEDED

## 2023-06-21 NOTE — PATIENT INSTRUCTIONS
Luz,    We will check your laboratory testing as we discussed  Please set up your bone density test when you are able  Your colonoscopy will not be due until 2025  Continue your current medications  Take the lorazepam sparingly as needed  I will forward your preoperative evaluation  You can complete the healthcare directives  You can also consider the tetanus booster    Michele Deleon MD          Patient Education   Personalized Prevention Plan  You are due for the preventive services outlined below.  Your care team is available to assist you in scheduling these services.  If you have already completed any of these items, please share that information with your care team to update in your medical record.  Health Maintenance Due   Topic Date Due    ANNUAL REVIEW OF HM ORDERS  Never done    Depression Action Plan  Never done    Zoster (Shingles) Vaccine (1 of 2) Never done    LUNG CANCER SCREENING  Never done    Diptheria Tetanus Pertussis (DTAP/TDAP/TD) Vaccine (2 - Td or Tdap) 01/12/2019    COVID-19 Vaccine (4 - Moderna series) 01/03/2022     Your Health Risk Assessment indicates you feel you are not in good health    A healthy lifestyle helps keep the body fit and the mind alert. It helps protect you from disease, helps you fight disease, and helps prevent chronic disease (disease that doesn't go away) from getting worse. This is important as you get older and begin to notice twinges in muscles and joints and a decline in the strength and stamina you once took for granted. A healthy lifestyle includes good healthcare, good nutrition, weight control, recreation, and regular exercise. Avoid harmful substances and do what you can to keep safe. Another part of a healthy lifestyle is stay mentally active and socially involved.    Good healthcare   Have a wellness visit every year.   If you have new symptoms, let us know right away. Don't wait until the next checkup.   Take medicines exactly as prescribed and keep  your medicines in a safe place. Tell us if your medicine causes problems.   Healthy diet and weight control   Eat 3 or 4 small, nutritious, low-fat, high-fiber meals a day. Include a variety of fruits, vegetables, and whole-grain foods.   Make sure you get enough calcium in your diet. Calcium, vitamin D, and exercise help prevent osteoporosis (bone thinning).   If you live alone, try eating with others when you can. That way you get a good meal and have company while you eat it.   Try to keep a healthy weight. If you eat more calories than your body uses for energy, it will be stored as fat and you will gain weight.     Recreation   Recreation is not limited to sports and team events. It includes any activity that provides relaxation, interest, enjoyment, and exercise. Recreation provides an outlet for physical, mental, and social energy. It can give a sense of worth and achievement. It can help you stay healthy.    Mental Exercise and Social Involvement  Mental and emotional health is as important as physical health. Keep in touch with friends and family. Stay as active as possible. Continue to learn and challenge yourself.   Things you can do to stay mentally active are:  Learn something new, like a foreign language or musical instrument.   Play SCRABBLE or do crossword puzzles. If you cannot find people to play these games with you at home, you can play them with others on your computer through the Internet.   Join a games club--anything from card games to chess or checkers or lawn bowling.   Start a new hobby.   Go back to school.   Volunteer.   Read.   Keep up with world events.    Understanding USDA MyPlate  The USDA has guidelines to help you make healthy food choices. These are called MyPlate. MyPlate shows the food groups that make up healthy meals using the image of a place setting. Before you eat, think about the healthiest choices for what to put on your plate or in your cup or bowl. To learn more about  building a healthy plate, visit www.choosemyplate.gov.     The food groups  Fruits. Any fruit or 100% fruit juice counts as part of the Fruit Group. Fruits may be fresh, canned, frozen, or dried, and may be whole, cut-up, or pureed. Make 1/2 of your plate fruits and vegetables.  Vegetables. Any vegetable or 100% vegetable juice counts as a member of the Vegetable Group. Vegetables may be fresh, frozen, canned, or dried. They can be served raw or cooked and may be whole, cut-up, or mashed. Make 1/2 of your plate fruits and vegetables.  Grains. All foods made from grains are part of the Grains Group. These include wheat, rice, oats, cornmeal, and barley. Grains are often used to make foods such as bread, pasta, oatmeal, cereal, tortillas, and grits. Grains should be no more than 1/4 of your plate. At least half of your grains should be whole grains.  Protein. This group includes meat, poultry, seafood, beans and peas, eggs, processed soy products (such as tofu), nuts (including nut butters), and seeds. Make protein choices no more than 1/4 of your plate. Meat and poultry choices should be lean or low fat.  Dairy. The Dairy Group includes all fluid milk products and foods made from milk that contain calcium, such as yogurt and cheese. (Foods that have little calcium, such as cream, butter, and cream cheese, are not part of this group.) Most dairy choices should be low-fat or fat-free.  Oils. Oils aren't a food group, but they do contain essential nutrients. However it's important to watch your intake of oils. These are fats that are liquid at room temperature. They include canola, corn, olive, soybean, vegetable, and sunflower oil. Foods that are mainly oil include mayonnaise, certain salad dressings, and soft margarines. You likely already get your daily oil allowance from the foods you eat.  Things to limit  Eating healthy also means limiting these things in your diet:  Salt (sodium). Many processed foods have a lot  of sodium. To keep sodium intake down, eat fresh vegetables, meats, poultry, and seafood when possible. Purchase low-sodium, reduced-sodium, or no-salt-added food products at the store. And don't add salt to your meals at home. Instead, season them with herbs and spices such as dill, oregano, cumin, and paprika. Or try adding flavor with lemon or lime zest and juice.  Saturated fat. Saturated fats are most often found in animal products such as beef, pork, and chicken. They are often solid at room temperature, such as butter. To reduce your saturated fat intake, choose leaner cuts of meat and poultry. And try healthier cooking methods such as grilling, broiling, roasting, or baking. For a simple lower-fat swap, use plain nonfat yogurt instead of mayonnaise when making potato salad or macaroni salad.  Added sugars. These are sugars added to foods. They are in foods such as ice cream, candy, soda, fruit drinks, sports drinks, energy drinks, cookies, pastries, jams, and syrups. Cut down on added sugars by sharing sweet treats with a family member or friend. You can also choose fruit for dessert, and drink water or other unsweetened beverages.  [employer] last reviewed this educational content on 6/1/2020 2000-2022 [employer]. All rights reserved. This information is not intended as a substitute for professional medical care. Always follow your healthcare professional's instructions.          Urinary Incontinence, Female (Adult)   Urinary incontinence means loss of bladder control. This problem affects many women, especially as they get older. If you have incontinence, you may be embarrassed to ask for help. But know that this problem can be treated.   Types of Incontinence  There are different types of incontinence. Two of the main types are described here. You can have more than one type.   Stress incontinence. With this type, urine leaks when pressure (stress) is put on the bladder. This may happen  when you cough, sneeze, or laugh. Stress incontinence most often occurs because the pelvic floor muscles that support the bladder and urethra are weak. This can happen after pregnancy and vaginal childbirth or a hysterectomy. It can also be due to excess body weight or hormone changes.  Urge incontinence (also called overactive bladder). With this type, a sudden urge to urinate is felt often. This may happen even though there may not be much urine in the bladder. The need to urinate often during the night is common. Urge incontinence most often occurs because of bladder spasms. This may be due to bladder irritation or infection. Damage to bladder nerves or pelvic muscles, constipation, and certain medicines can also lead to urge incontinence.  Treatment depends on the cause. Further evaluation is needed to find the type you have. This will likely include an exam and certain tests. Based on the results, you and your healthcare provider can then plan treatment. Until a diagnosis is made, the home care tips below can help ease symptoms.   Home care  Do pelvic floor muscle exercises, if they are prescribed. The pelvic floor muscles help support the bladder and urethra. Many women find that their symptoms improve when doing special exercises that strengthen these muscles. To do the exercises, contract the muscles you would use to stop your stream of urine. But do this when you re not urinating. Hold for 10 seconds, then relax. Repeat 10 to 20 times in a row, at least 3 times a day. Your healthcare provider may give you other instructions for how to do the exercises and how often.  Keep a bladder diary. This helps track how often and how much you urinate over a set period of time. Bring this diary with you to your next visit with the provider. The information can help your provider learn more about your bladder problem.  Lose weight, if advised to by your provider. Extra weight puts pressure on the bladder. Your provider  can help you create a weight-loss plan that s right for you. This may include exercising more and making certain diet changes.  Don't have foods and drinks that may irritate the bladder. These can include alcohol and caffeinated drinks.  Quit smoking. Smoking and other tobacco use can lead to a long-term (chronic) cough that strains the pelvic floor muscles. Smoking may also damage the bladder and urethra. Talk with your provider about treatments or methods you can use to quit smoking.  If drinking large amounts of fluid makes you have symptoms, you may be advised to limit your fluid intake. You may also be advised to drink most of your fluids during the day and to limit fluids at night.  If you re worried about urine leakage or accidents, you may wear absorbent pads to catch urine. Change the pads often. This helps reduce discomfort. It may also reduce the risk of skin or bladder infections.    Follow-up care  Follow up with your healthcare provider, or as directed. It may take some to find the right treatment for your problem. But healthy lifestyle changes can be made right away. These include such things as exercising on a regular basis, eating a healthy diet, losing weight (if needed), and quitting smoking. Your treatment plan may include special therapies or medicines. Certain procedures or surgery may also be options. Talk about any questions you have with your provider.   When to seek medical advice  Call the healthcare provider right away if any of these occur:  Fever of 100.4 F (38 C) or higher, or as directed by your provider  Bladder pain or fullness  Belly swelling  Nausea or vomiting  Back pain  Weakness, dizziness, or fainting  [employer] last reviewed this educational content on 1/1/2020 2000-2022 [employer]. All rights reserved. This information is not intended as a substitute for professional medical care. Always follow your healthcare professional's instructions.        Your Health  "Risk Assessment indicates you feel you are not in good emotional health.    Recreation   Recreation is not limited to sports and team events. It includes any activity that provides relaxation, interest, enjoyment, and exercise. Recreation provides an outlet for physical, mental, and social energy. It can give a sense of worth and achievement. It can help you stay healthy.    Mental Exercise and Social Involvement  Mental and emotional health is as important as physical health. Keep in touch with friends and family. Stay as active as possible. Continue to learn and challenge yourself.   Things you can do to stay mentally active are:  Learn something new, like a foreign language or musical instrument.   Play SCRABBLE or do crossword puzzles. If you cannot find people to play these games with you at home, you can play them with others on your computer through the Internet.   Join a games club--anything from card games to chess or checkers or lawn bowling.   Start a new hobby.   Go back to school.   Volunteer.   Read.   Keep up with world events.    Depression and Suicide in Older Adults  Nearly 2 million older adults in the [place] have some type of depression. Some of them even take their own lives. Yet depression among older adults is often ignored. Learning about the warning signs of depression may help spare a loved one needless pain. You may also save a life.   What is depression?  Depression is a common and serious illness. It affects the way you think and feel. It is not a normal part of aging. It is not a sign of weakness, a character flaw, or something you can \"snap out of.\" Most people with depression need treatment to get better. The most common symptom is a feeling of deep sadness. People who are depressed also may seem tired and listless. And nothing seems to give them pleasure. It s normal to grieve or be sad sometimes. But sadness lessens or passes with time. Depression rarely goes away or improves on its " own. Other symptoms of depression are:   Sleeping more or less than normal  Eating more or less than normal  Having headaches, stomachaches, or other pains that don t go away  Feeling nervous,  empty,  or worthless  Crying a lot  Thinking or talking about suicide or death  Loss of interest in activities previously enjoyed  Social isolation  Feeling confused or forgetful  What causes it?  The causes of depression aren t fully known. But it is thought to result from a complex blend of these factors:   Biochemistry. Certain chemicals in the brain play a role.  Genes. Depression does run in families.  Life stress. Life stresses can also trigger depression in some people. Older adults often face many stressors. These may include isolation, the death of friends or a spouse, health problems, and financial concerns.  Chronic health conditions. This includes diabetes, heart disease, or cancer. These can cause symptoms of depression. Medicine side effects can cause changes in thoughts and behaviors.  Giving support    Depressed people often may not want to ask for help. When they do, they may be ignored. Or they may get the wrong treatment. You can help by showing parents and older friends love and support. If they seem depressed, don t lecture the person or ignore the symptoms. Don't discount the symptoms as a  normal  part of aging. They are not. Get involved, listen, and show interest and support.   Help them understand that depression is a treatable illness. Tell them you can help them find the right treatment. Offer to go to their healthcare provider's appointment with them for support when the symptoms are discussed. With their approval, contact a local mental health center, social service agency, or hospital about services.   Helping at healthcare visits  You can be an advocate for them at healthcare appointments. Many older adults have chronic illnesses. Many of these can cause symptoms of depression. Medicine side  effects can change thoughts and behaviors.   You can help make sure that the healthcare provider looks at all of these factors. They should refer your family member or friend to a mental healthcare provider when needed. In some cases, untreated depression can lead to a misdiagnosis. A person may be diagnosed with a brain disorder such as dementia. If the healthcare provider does not take the issue of depression seriously, help your family member or friend find another provider.   Asking about self-harm thoughts  If you think an older person you care about could be suicidal, ask,  Have you thought about suicide?  Most people will tell you the truth. If they say yes, they may already have a plan for how and when they will attempt it. Find out as much as you can. The more detailed the plan, and the easier it is to carry out, the more danger the person is in right now. Tell the person you are there for them and you do not want them to get harmed. Don't wait to get help for the person. Call the person's healthcare provider, local hospital, or emergency services.   Call 988 in a crisis   Never leave the person alone. A person who is actively suicidal needs crisis care right away. They need constant supervision. Never leave the person out of sight. Call 988 Tell the crisis counselor you need help for a person who is thinking about suicide. The counselor will help you get the right level of crisis help. You may be advised to take the person to the nearest emergency room.   The National Suicide Prevention Lifeline is available at 988, or 522-084-OQKB (229-984-8961), or www.suicidepreventionlifeline.org. When you call or text 988, you will be connected to trained counselors who are part of the National Suicide Prevention Lifeline network. An online chat option is also available. Lifeline is free and available 24/7.   To learn more  National Suicide Prevention Lifeline at www.suicidepreventionlifeline.org  or 648-206-IPQT  (586.874.5903)  National [place] on Mental Illness at www.luh.org  or 646-876-SXAY (433-841-2906)  [facility] [place] at www.[facility].org  or 901-432-3359  National [place] of Mental Health at www.nimh.nih.gov  or 150-682-9541    [employer] last reviewed this educational content on 7/1/2022 2000-2022 [employer]. All rights reserved. This information is not intended as a substitute for professional medical care. Always follow your healthcare professional's instructions.          Preventing Falls at Home  A person can fall for many reasons. Older adults may fall because reaction time slows down as we age. Your muscles and joints may get stiff, weak, or less flexible because of illness, medicines, or a physical condition.   Other health problems that make falls more likely include:   Arthritis  Dizziness or lightheadedness when you stand up (orthostatic hypotension)  History of a stroke  Dizziness  Anemia  Certain medicines taken for mental illness or to control blood pressure.  Problems with balance or gait  Bladder or urinary problems  History of falling  Changes in vision (vision impairment)  Changes in thinking skills and memory (cognitive impairment)  Injuries from a fall can include serious injuries such as broken bones, dislocated joints, internal bleeding and cuts. Injuries like these can limit your independence.   Prevention tips  To help prevent falls and fall-related injuries, follow the tips below.    Floors  To make floors safer:   Put nonskid pads under area rugs.  Remove small rugs.  Replace worn floor coverings.  Tack carpets firmly to each step on carpeted stairs. Put nonskid strips on the edges of uncarpeted stairs.  Keep floors and stairs free of clutter and cords.  Arrange furniture so there are clear pathways.  Clean up any spills right away.  Bathrooms    To make bathrooms safer:   Install grab bars in the tub or shower.  Apply nonskid strips or put a nonskid rubber mat in the  tub or shower.  Sit on a bath chair to bathe.  Use bathmats with nonskid backing.  Lighting  To improve visibility in your home:    Keep a flashlight in each room. Or put a lamp next to the bed within easy reach.  Put nightlights in the bedrooms, hallways, kitchen, and bathrooms.  Make sure all stairways have good lighting.  Take your time when going up and down stairs.  Put handrails on both sides of stairs and in walkways for more support. To prevent injury to your wrist or arm, don t use handrails to pull yourself up.  Install grab bars to pull yourself up.  Move or rearrange items that you use often. This will make them easier to find or reach.  Look at your home to find any safety hazards. Especially look at doorways, walkways, and the driveway. Remove or repair any safety problems that you find.  Other changes to make  Look around to find any safety hazards. Look closely at doorways, walkways, and the driveway. Remove or repair any safety problems that you find.  Wear shoes that fit well.  Take your time when going up and down stairs.  Put handrails on both sides of stairs and in walkways for more support. To prevent injury to your wrist or arm, don t use handrails to pull yourself up.  Install grab bars wherever needed to pull yourself up.  Arrange items that you use often. This will make them easier to find or reach.    [employer] last reviewed this educational content on 3/1/2020    2521-1483 [employer]. All rights reserved. This information is not intended as a substitute for professional medical care. Always follow your healthcare professional's instructions.

## 2023-06-21 NOTE — PROGRESS NOTES
"SUBJECTIVE:   Luz is a 72 year old who presents for Preventive Visit.    Luz presents to the clinic for an annual wellness visit.     Her medical history is notable for hyperlipidemia, gastroesophageal reflux disease, and nicotine dependence.    More recent history is notable for cataracts affecting her vision. She is a candidate for surgery.     She continues to take atorvastatin 10 mg daily.  She also takes lansoprazole 30 mg daily.  Her symptoms have generally been well controlled.  She has not been able to wean from her reflux medication.    Last year her  Eusebio passed away. This has been a challenging time as she continues to grieve his loss. She will take occasional lorazepam for anxiety. She has preferred not to take a daily medication.    She does have a history of right knee arthritis and has followed up with orthopedics and has had knee injections.         6/21/2023    11:24 AM   Additional Questions   Roomed by Penelope FISCHER CMA     Are you in the first 12 months of your Medicare coverage?  No    Healthy Habits:     In general, how would you rate your overall health?  Fair    Frequency of exercise:  2-3 days/week    Duration of exercise:  15-30 minutes    Do you usually eat at least 4 servings of fruit and vegetables a day, include whole grains    & fiber and avoid regularly eating high fat or \"junk\" foods?  No    Taking medications regularly:  Yes    Barriers to taking medications:  Not applicable    Medication side effects:  None    Ability to successfully perform activities of daily living:  No assistance needed    Home Safety:  No safety concerns identified    Hearing Impairment:  No hearing concerns    In the past 6 months, have you been bothered by leaking of urine? Yes    In general, how would you rate your overall mental or emotional health?  Fair      PHQ-2 Total Score: 3    Additional concerns today:  Yes        Have you ever done Advance Care Planning? (For example, a Health Directive, " POLST, or a discussion with a medical provider or your loved ones about your wishes): Yes, patient states has an Advance Care Planning document and will bring a copy to the clinic.      Fall risk  Fallen 2 or more times in the past year?: No  Any fall with injury in the past year?: Yes    Cognitive Screening   1) Repeat 3 items (Leader, Season, Table)    2) Clock draw:NORMAL  3) 3 item recall:Recalls 2 objects   Results: NORMAL clock, 1-2 items recalled: COGNITIVE IMPAIRMENT LESS LIKELY    Mini-CogTM Copyright RAQUEL Kemp. Licensed by the author for use in [facility]; reprinted with permission (komal@[facility]). All rights reserved.      Do you have sleep apnea, excessive snoring or daytime drowsiness?: no    Reviewed and updated as needed this visit by clinical staff   Tobacco  Allergies  Meds              Reviewed and updated as needed this visit by Provider                 Social History     Tobacco Use     Smoking status: Light Smoker     Packs/day: 0.50     Types: Cigarettes     Smokeless tobacco: Never     Tobacco comments:     quit 5/2020   Substance Use Topics     Alcohol use: Not on file     Comment: Occasionally             6/21/2023    11:17 AM   Alcohol Use   Prescreen: >3 drinks/day or >7 drinks/week? No          No data to display              Do you have a current opioid prescription? No  Do you use any other controlled substances or medications that are not prescribed by a provider? None          Current providers sharing in care for this patient include:Patient Care Team:  Michele Deleon MD as PCP - General (Family Practice)  Yeimi Sepulveda MD as PCP - Obstetrics/Gynecology  Pierre Herrera MD as MD (Neurology)  Michele Deleon MD as Assigned PCP    The following health maintenance items are reviewed in Epic and correct as of today:  Health Maintenance   Topic Date Due     ANNUAL REVIEW OF HM ORDERS  Never done     DEPRESSION ACTION PLAN  Never done      ZOSTER IMMUNIZATION (1 of 2) Never done     LUNG CANCER SCREENING  Never done     DTAP/TDAP/TD IMMUNIZATION (2 - Td or Tdap) 01/12/2019     COVID-19 Vaccine (4 - Moderna series) 01/03/2022     NICOTINE/TOBACCO CESSATION COUNSELING Q 1 YR  04/29/2023     MEDICARE ANNUAL WELLNESS VISIT  04/29/2023     INFLUENZA VACCINE (Season Ended) 09/01/2023     PHQ-9  12/21/2023     FALL RISK ASSESSMENT  06/21/2024     MAMMO SCREENING  07/26/2024     COLORECTAL CANCER SCREENING  02/05/2025     LIPID  04/29/2027     ADVANCE CARE PLANNING  04/30/2027     DEXA  06/13/2029     Pneumococcal Vaccine: 65+ Years  Completed     IPV IMMUNIZATION  Aged Out     MENINGITIS IMMUNIZATION  Aged Out     HEPATITIS C SCREENING  Discontinued     Patient Active Problem List   Diagnosis     Anxiety     Esophageal Reflux     Hyperlipidemia     Osteopenia     Vitamin D deficiency     Cigarette nicotine dependence without complication     Anxiety     Moderate major depression (H)     History reviewed. No pertinent surgical history.    Social History     Tobacco Use     Smoking status: Light Smoker     Packs/day: 0.50     Types: Cigarettes     Smokeless tobacco: Never     Tobacco comments:     quit 5/2020   Substance Use Topics     Alcohol use: Not on file     Comment: Occasionally     Family History   Problem Relation Age of Onset     Cancer Son      No Known Problems Mother      No Known Problems Father      No Known Problems Sister      No Known Problems Daughter      No Known Problems Maternal Grandmother      No Known Problems Maternal Grandfather      No Known Problems Paternal Grandmother      No Known Problems Paternal Grandfather      No Known Problems Maternal Aunt      No Known Problems Paternal Aunt      Hereditary Breast and Ovarian Cancer Syndrome No family hx of      Breast Cancer No family hx of      Cancer No family hx of      Colon Cancer No family hx of      Endometrial Cancer No family hx of      Ovarian Cancer No family hx of       "    Current Outpatient Medications   Medication Sig Dispense Refill     LORazepam (ATIVAN) 1 MG tablet TAKE 1 TABLET BY MOUTH DAILY AS NEEDED FOR ANXIETY 30 tablet 0     atorvastatin (LIPITOR) 10 MG tablet TAKE 1 TABLET BY MOUTH EVERY DAY 90 tablet 3     LANsoprazole (PREVACID) 30 MG DR capsule TAKE 1 CAPSULE BY MOUTH EVERY DAY 90 capsule 3     No Known Allergies          Review of Systems   Constitutional: Negative for chills and fever.   HENT: Negative for congestion, ear pain, hearing loss and sore throat.    Eyes: Negative for pain and visual disturbance.   Respiratory: Negative for cough and shortness of breath.    Cardiovascular: Positive for peripheral edema. Negative for chest pain and palpitations.   Gastrointestinal: Negative for abdominal pain, constipation, diarrhea, heartburn, hematochezia and nausea.   Breasts:  Negative for tenderness, breast mass and discharge.   Genitourinary: Negative for dysuria, frequency, genital sores, hematuria, pelvic pain, urgency, vaginal bleeding and vaginal discharge.   Musculoskeletal: Positive for joint swelling and myalgias. Negative for arthralgias.   Skin: Negative for rash.   Neurological: Negative for dizziness, weakness, headaches and paresthesias.   Psychiatric/Behavioral: Positive for mood changes. The patient is nervous/anxious.          OBJECTIVE:   /74 (BP Location: Left arm, Patient Position: Sitting, Cuff Size: Adult Regular)   Pulse 85   Temp 97.6  F (36.4  C) (Oral)   Resp 20   Ht 1.664 m (5' 5.5\")   Wt 71.3 kg (157 lb 3.2 oz)   LMP  (LMP Unknown)   SpO2 98%   BMI 25.76 kg/m   Estimated body mass index is 25.76 kg/m  as calculated from the following:    Height as of this encounter: 1.664 m (5' 5.5\").    Weight as of this encounter: 71.3 kg (157 lb 3.2 oz).  Physical Exam  GENERAL: healthy, alert and no distress  EYES: Eyes grossly normal to inspection, PERRL and conjunctivae and sclerae normal  HENT: ear canals and TM's normal, nose and " mouth without ulcers or lesions  NECK: no adenopathy, no asymmetry, masses, or scars and thyroid normal to palpation  RESP: lungs clear to auscultation - no rales, rhonchi or wheezes  CV: regular rate and rhythm, normal S1 S2, no S3 or S4, no murmur, click or rub, no peripheral edema and peripheral pulses strong  ABDOMEN: soft, nontender  MS: no gross musculoskeletal defects noted, no edema  SKIN: no suspicious lesions or rashes  NEURO: Normal strength and tone, mentation intact and speech normal  PSYCH: mentation appears normal, affect normal/bright    Diagnostic Test Results:  Labs reviewed in Epic    ASSESSMENT / PLAN:   Max was seen today for pre-op exam and wellness visit.    Diagnoses and all orders for this visit:    Encounter for Medicare annual wellness exam    Colonoscopy is up to date from February of 2020. She is due again in 2025    Mammogram was normal in July of 2022    Reviewed vaccines    -     PRIMARY CARE FOLLOW-UP SCHEDULING; Future        Preoperative examination  Cataract of both eyes, unspecified cataract type    Refer to separate preoperative note    Hyperlipidemia, unspecified hyperlipidemia type    Check laboratory testing  Continue atorvastatin    -     Hepatic function panel; Future  -     Lipid panel reflex to direct LDL Fasting; Future  -     Hepatic function panel  -     Lipid panel reflex to direct LDL Fasting    Gastro-esophageal reflux disease without esophagitis    Continue lansoprazole    -     CBC with platelets; Future  -     Basic metabolic panel; Future  -     CBC with platelets  -     Basic metabolic panel    Vitamin D deficiency, unspecified    Check a vitamin D level    -     Vitamin D Deficiency; Future  -     Vitamin D Deficiency    Postmenopausal status    Recommend adequate calcium and vitamin D  Encourage weight bearing exercise  Refer for a bone density test    -     DX Hip/Pelvis/Spine; Future    Anxiety    Refilled lorazepam  Recommend taking this sparingly  She  "prefers not to take a daily medication  -     LORazepam (ATIVAN) 1 MG tablet; TAKE 1 TABLET BY MOUTH DAILY AS NEEDED FOR ANXIETY    Cigarette nicotine dependence without complication    Encourage tobacco cessation    Patient has been advised of split billing requirements and indicates understanding: Yes      COUNSELING:  Reviewed preventive health counseling, as reflected in patient instructions       Regular exercise       Healthy diet/nutrition       Alcohol Use        Colon cancer screening      BMI:   Estimated body mass index is 25.76 kg/m  as calculated from the following:    Height as of this encounter: 1.664 m (5' 5.5\").    Weight as of this encounter: 71.3 kg (157 lb 3.2 oz).   Weight management plan: Discussed healthy diet and exercise guidelines      She reports that she has been smoking cigarettes. She has been smoking an average of .5 packs per day. She has never used smokeless tobacco.  Nicotine/Tobacco Cessation Plan:   Information offered: Patient not interested at this time      Appropriate preventive services were discussed with this patient, including applicable screening as appropriate for cardiovascular disease, diabetes, osteopenia/osteoporosis, and glaucoma.  As appropriate for age/gender, discussed screening for colorectal cancer, prostate cancer, breast cancer, and cervical cancer. Checklist reviewing preventive services available has been given to the patient.    Reviewed patients plan of care and provided an AVS. The Basic Care Plan (routine screening as documented in Health Maintenance) for Max meets the Care Plan requirement. This Care Plan has been established and reviewed with the Patient.          Michele Deleon MD  [facility]    Identified Health Risks:    I have reviewed Opioid Use Disorder and Substance Use Disorder risk factors and made any needed referrals.     Answers for HPI/ROS submitted by the patient on 6/21/2023  If you checked off any " problems, how difficult have these problems made it for you to do your work, take care of things at home, or get along with other people?: Somewhat difficult  PHQ9 TOTAL SCORE: 10

## 2023-06-21 NOTE — PROGRESS NOTES
"    The patient was provided with suggestions to help her develop a healthy physical lifestyle.  The patient was counseled and encouraged to consider modifying their diet and eating habits. She was provided with information on recommended healthy diet options.  Information on urinary incontinence and treatment options given to patient.  The patient was provided with suggestions to help her develop a healthy emotional lifestyle.  The patient s PHQ-9 score is consistent with moderate depression. She was provided with information regarding depression and was advised to schedule a follow up appointment in 12 weeks to further address this issue.  She is at risk for falling and has been provided with information to reduce the risk of falling at home.  Answers for HPI/ROS submitted by the patient on 6/21/2023  If you checked off any problems, how difficult have these problems made it for you to do your work, take care of things at home, or get along with other people?: Somewhat difficult  PHQ9 TOTAL SCORE: 10  In general, how would you rate your overall physical health?: fair  Frequency of exercise:: 2-3 days/week  Do you usually eat at least 4 servings of fruit and vegetables a day, include whole grains & fiber, and avoid regularly eating high fat or \"junk\" foods? : No  Taking medications regularly:: Yes  Medication side effects:: None  Activities of Daily Living: no assistance needed  Home safety: no safety concerns identified  Hearing Impairment:: no hearing concerns  In the past 6 months, have you been bothered by leaking of urine?: Yes  abdominal pain: No  Blood in stool: No  Blood in urine: No  chest pain: No  chills: No  congestion: No  constipation: No  cough: No  diarrhea: No  dizziness: No  ear pain: No  eye pain: No  nervous/anxious: Yes  fever: No  frequency: No  genital sores: No  headaches: No  hearing loss: No  heartburn: No  arthralgias: No  joint swelling: Yes  peripheral edema: Yes  mood changes: " Yes  myalgias: Yes  nausea: No  dysuria: No  palpitations: No  Skin sensation changes: No  sore throat: No  urgency: No  rash: No  shortness of breath: No  visual disturbance: No  weakness: No  pelvic pain: No  vaginal bleeding: No  vaginal discharge: No  tenderness: No  breast mass: No  breast discharge: No  In general, how would you rate your overall mental or emotional health?: fair  Duration of exercise:: 15-30 minutes

## 2023-06-21 NOTE — PROGRESS NOTES
[facility]  [address] [facility] [address]  Phone: [phone]  Fax: [phone]  Primary Provider: Lorene Deleon  Pre-op Performing Provider: LORENE DELEON      PREOPERATIVE EVALUATION:  Today's date: 6/21/2023    Max Thompson is a 72 year old female who presents for a preoperative evaluation.      6/21/2023    11:24 AM   Additional Questions   Roomed by Penelope FISCHER CMA     Surgical Information:  Surgery/Procedure: Bilateral cataract surgery   [facility]   Surgeon: Dr. Wang   Surgery Date: 7/11/2023 Left eye, 7/25/2025 Right eye  Time of Surgery: TBD  Where patient plans to recover: At home with family  Fax number for surgical facility: [phone]    Assessment & Plan     The proposed surgical procedure is considered LOW risk.      Preoperative examination  Cataract of both eyes, unspecified cataract type    Patient is approved for cataract surgery  Recommend follow-up as recommended by ophthalmology    Hyperlipidemia, unspecified hyperlipidemia type    Continue atorvastatin    - Hepatic function panel  - Lipid panel reflex to direct LDL Fasting  - Hepatic function panel  - Lipid panel reflex to direct LDL Fasting    Gastro-esophageal reflux disease without esophagitis    Continue lansoprazole    - CBC with platelets  - Basic metabolic panel  - CBC with platelets  - Basic metabolic panel    Vitamin D deficiency, unspecified    Check a vitamin D level    - Vitamin D Deficiency  - Vitamin D Deficiency    Postmenopausal status    Refer for a bone density test  - DX Hip/Pelvis/Spine    Anxiety    She will take lorazepam sparingly    - LORazepam (ATIVAN) 1 MG tablet  Dispense: 30 tablet; Refill: 0    Cigarette nicotine dependence without complication    Encourage tobacco cessation             - No identified additional risk factors other than previously addressed    Antiplatelet or Anticoagulation Medication Instructions:   - Patient  is on no antiplatelet or anticoagulation medications.    Additional Medication Instructions:  Patient is to take all scheduled medications on the day of surgery    RECOMMENDATION:  APPROVAL GIVEN to proceed with proposed procedure, without further diagnostic evaluation.    Review of external notes as documented elsewhere in note        Subjective       HPI related to upcoming procedure:     Luz presents to the clinic for preoperative examination. She has a history of bilateral cataracts and is a candidate for surgery    Her medical history is notable for hyperlipidemia, gastroesophageal reflux disease, and nicotine dependence.    She presents for an Annual Wellness Visit as well.     She continues to take atorvastatin 10 mg daily.  She also takes lansoprazole 30 mg daily.  Her symptoms have generally been well controlled.  She has not been able to wean from her reflux medication.          6/21/2023    11:24 AM   Preop Questions   1. Have you ever had a heart attack or stroke? No   2. Have you ever had surgery on your heart or blood vessels, such as a stent placement, a coronary artery bypass, or surgery on an artery in your head, neck, heart, or legs? No   3. Do you have chest pain with activity? No   4. Do you have a history of  heart failure? No   5. Do you currently have a cold, bronchitis or symptoms of other infection? No   6. Do you have a cough, shortness of breath, or wheezing? No   7. Do you or anyone in your family have previous history of blood clots? No   8. Do you or does anyone in your family have a serious bleeding problem such as prolonged bleeding following surgeries or cuts? No   9. Have you ever had problems with anemia or been told to take iron pills? No   10. Have you had any abnormal blood loss such as black, tarry or bloody stools, or abnormal vaginal bleeding? No   11. Have you ever had a blood transfusion? No   12. Are you willing to have a blood transfusion if it is medically needed  before, during, or after your surgery? Yes   13. Have you or any of your relatives ever had problems with anesthesia? No   14. Do you have sleep apnea, excessive snoring or daytime drowsiness? No   15. Do you have any artifical heart valves or other implanted medical devices like a pacemaker, defibrillator, or continuous glucose monitor? No   16. Do you have artificial joints? No   17. Are you allergic to latex? No       Health Care Directive:  Patient does not have a Health Care Directive or Living Will: Discussed advance care planning with patient; information given to patient to review.    Preoperative Review of : Reviewed.      Status of Chronic Conditions:  See problem list for active medical problems.  Problems all longstanding and stable, except as noted/documented.  See ROS for pertinent symptoms related to these conditions.      Review of Systems   Constitutional: Negative for chills and fever.   HENT: Negative for congestion, ear pain, hearing loss and sore throat.    Eyes: Negative for pain and visual disturbance.   Respiratory: Negative for cough and shortness of breath.    Cardiovascular: Positive for peripheral edema. Negative for chest pain and palpitations.   Gastrointestinal: Negative for abdominal pain, constipation, diarrhea, heartburn, hematochezia and nausea.   Breasts:  Negative for tenderness, breast mass and discharge.   Genitourinary: Negative for dysuria, frequency, genital sores, hematuria, pelvic pain, urgency, vaginal bleeding and vaginal discharge.   Musculoskeletal: Positive for joint swelling and myalgias. Negative for arthralgias.   Skin: Negative for rash.   Neurological: Negative for dizziness, weakness, headaches and paresthesias.   Psychiatric/Behavioral: Positive for mood changes. The patient is nervous/anxious.      Constitutional, neuro, ENT, endocrine, pulmonary, cardiac, gastrointestinal, genitourinary, musculoskeletal, integument and psychiatric systems are negative,  except as otherwise noted.    Patient Active Problem List    Diagnosis Date Noted     Moderate major depression (H) 05/20/2021     Priority: Medium     Anxiety 11/05/2020     Priority: Medium     Cigarette nicotine dependence without complication 01/21/2019     Priority: Medium     Vitamin D deficiency 12/02/2016     Priority: Medium     Hyperlipidemia      Priority: Medium     Created by Conversion         Anxiety      Priority: Medium     Created by Conversion  Replacement Utility updated for latest IMO load         Osteopenia      Priority: Medium     Created by Conversion  Replacement Utility updated for latest IMO load         Esophageal Reflux      Priority: Medium     Created by Conversion          Past Medical History:   Diagnosis Date     Breast cyst 2013     No past surgical history on file.  Current Outpatient Medications   Medication Sig Dispense Refill     atorvastatin (LIPITOR) 10 MG tablet TAKE 1 TABLET BY MOUTH EVERY DAY 90 tablet 3     LANsoprazole (PREVACID) 30 MG DR capsule TAKE 1 CAPSULE BY MOUTH EVERY DAY 90 capsule 3     LORazepam (ATIVAN) 1 MG tablet TAKE 1 TABLET BY MOUTH DAILY AS NEEDED FOR ANXIETY 30 tablet 0     oxyCODONE (ROXICODONE) 5 MG tablet Take 1 tablet (5 mg) by mouth every 6 hours as needed for pain (Patient not taking: Reported on 6/21/2023) 10 tablet 0       No Known Allergies     Social History     Tobacco Use     Smoking status: Light Smoker     Packs/day: 0.50     Types: Cigarettes     Smokeless tobacco: Never     Tobacco comments:     quit 5/2020   Substance Use Topics     Alcohol use: Not on file     Comment: Occasionally     Family History   Problem Relation Age of Onset     Cancer Son      No Known Problems Mother      No Known Problems Father      No Known Problems Sister      No Known Problems Daughter      No Known Problems Maternal Grandmother      No Known Problems Maternal Grandfather      No Known Problems Paternal Grandmother      No Known Problems Paternal  "Grandfather      No Known Problems Maternal Aunt      No Known Problems Paternal Aunt      Hereditary Breast and Ovarian Cancer Syndrome No family hx of      Breast Cancer No family hx of      Cancer No family hx of      Colon Cancer No family hx of      Endometrial Cancer No family hx of      Ovarian Cancer No family hx of      History   Drug Use Not on file         Objective     /74 (BP Location: Left arm, Patient Position: Sitting, Cuff Size: Adult Regular)   Pulse 85   Temp 97.6  F (36.4  C) (Oral)   Resp 20   Ht 1.664 m (5' 5.5\")   Wt 71.3 kg (157 lb 3.2 oz)   LMP  (LMP Unknown)   SpO2 98%   BMI 25.76 kg/m      Physical Exam    GENERAL APPEARANCE: healthy, alert and no distress     EYES: EOMI, PERRL     HENT: ear canals and TM's normal and nose and mouth without ulcers or lesions     NECK: no adenopathy, no asymmetry, masses, or scars and thyroid normal to palpation     RESP: lungs clear to auscultation - no rales, rhonchi or wheezes     CV: regular rates and rhythm, normal S1 S2, no S3 or S4 and no murmur, click or rub     MS: extremities normal- no gross deformities noted, no evidence of inflammation in joints, FROM in all extremities.     SKIN: no suspicious lesions or rashes     NEURO: Normal strength and tone, sensory exam grossly normal, mentation intact and speech normal     PSYCH: mentation appears normal. and affect normal/bright     LYMPHATICS: No cervical adenopathy    Recent Labs   Lab Test 04/29/22  1015   HGB 14.5         POTASSIUM 4.4   CR 0.76        Diagnostics:  No results found for this or any previous visit (from the past 240 hour(s)).   No EKG required for low risk surgery (cataract, skin procedure, breast biopsy, etc).    Revised Cardiac Risk Index (RCRI):  The patient has the following serious cardiovascular risks for perioperative complications:   - No serious cardiac risks = 0 points     RCRI Interpretation: 0 points: Class I (very low risk - 0.4% complication " rate)           Signed Electronically by: Michele Deleon MD  Copy of this evaluation report is provided to requesting physician.

## 2023-06-22 LAB — DEPRECATED CALCIDIOL+CALCIFEROL SERPL-MC: 17 UG/L (ref 20–75)

## 2023-09-25 ENCOUNTER — TRANSFERRED RECORDS (OUTPATIENT)
Dept: HEALTH INFORMATION MANAGEMENT | Facility: CLINIC | Age: 73
End: 2023-09-25
Payer: COMMERCIAL

## 2023-10-19 ENCOUNTER — OFFICE VISIT (OUTPATIENT)
Dept: FAMILY MEDICINE | Facility: CLINIC | Age: 73
End: 2023-10-19
Payer: COMMERCIAL

## 2023-10-19 VITALS
TEMPERATURE: 98.4 F | OXYGEN SATURATION: 98 % | HEART RATE: 82 BPM | WEIGHT: 159 LBS | RESPIRATION RATE: 15 BRPM | BODY MASS INDEX: 26.06 KG/M2 | SYSTOLIC BLOOD PRESSURE: 155 MMHG | DIASTOLIC BLOOD PRESSURE: 88 MMHG

## 2023-10-19 DIAGNOSIS — J01.90 ACUTE BACTERIAL RHINOSINUSITIS: Primary | ICD-10-CM

## 2023-10-19 DIAGNOSIS — B96.89 ACUTE BACTERIAL RHINOSINUSITIS: Primary | ICD-10-CM

## 2023-10-19 PROCEDURE — 99213 OFFICE O/P EST LOW 20 MIN: CPT | Performed by: NURSE PRACTITIONER

## 2023-10-19 ASSESSMENT — ENCOUNTER SYMPTOMS: APPETITE CHANGE: 0

## 2023-10-19 NOTE — PATIENT INSTRUCTIONS
Try mucinex (guaifenesin) for symptoms.  Keep up allergy pills.     Recheck  in 4-5 days if not better at your clinic or here.      Start Augmentin tonight     Probiotics - yogurt works

## 2023-10-19 NOTE — PROGRESS NOTES
Assessment & Plan     Acute bacterial rhinosinusitis    - amoxicillin-clavulanate (AUGMENTIN) 875-125 MG tablet  Dispense: 14 tablet; Refill: 0       Focused exam and history done due to COVID-19 pandemic in a walk-in setting.      Patient with 2 weeks of facial pressure with facial tenderness over bilateral maxillary sinuses, tooth pain and pain worse with leaning forward associated with yellow drainage.     Recheck in 4 to 5 days if not better.    Recheck if shortness of breath or new fevers develop.  Rest.     OTCs recommended: None [   ].  Dextromethorphan  [  ], guaifenesin [  x], pseudoephedrine [   ], Afrin for no greater than 3 days [  ], Tylenol [ x ].                Return in about 5 days (around 10/24/2023) for If no better.    Alexia Flores, [facility] GEMA Escobar is a 73 year old female who presents to clinic today for the following health issues:  Chief Complaint   Patient presents with    Sinus Problem     Couple of weeks, Facial pressure, nasal congestion, headache. Sudafed doesn't help     HPI    Nasal congestion, facial pressure and headache starting about 2 weeks ago.  Headache a few days.  Both eyes feel like a film on the outside of the eye.  Yellow runny nose.      No itching on eyes/ throat.      Denies assoc sx such as achiness, fever, chills, nausea.      History of smoking and allergies.      Taking claritin every day.                Review of Systems   Constitutional:  Negative for appetite change.           Objective    BP (!) 155/88 (BP Location: Right arm, Patient Position: Sitting, Cuff Size: Adult Small)   Pulse 82   Temp 98.4  F (36.9  C) (Oral)   Resp 15   Wt 72.1 kg (159 lb)   LMP  (LMP Unknown)   SpO2 98%   BMI 26.06 kg/m    Physical Exam  Constitutional:       General: She is not in acute distress.     Appearance: She is well-developed. She is not ill-appearing.   HENT:      Nose:      Right Nostril: No occlusion.      Left  Nostril: No occlusion.      Right Sinus: Maxillary sinus tenderness present.      Left Sinus: Maxillary sinus tenderness present.      Comments: Maxillary sinus area pain worse with leaning forward.  Also feeling pain worsening in the teeth with leaning forward.  Eyes:      General:         Right eye: No discharge.         Left eye: No discharge.      Conjunctiva/sclera: Conjunctivae normal.   Pulmonary:      Effort: Pulmonary effort is normal.   Musculoskeletal:         General: Normal range of motion.   Skin:     General: Skin is warm and dry.      Capillary Refill: Capillary refill takes less than 2 seconds.   Neurological:      Mental Status: She is alert and oriented to person, place, and time.   Psychiatric:         Mood and Affect: Mood normal.         Behavior: Behavior normal.         Thought Content: Thought content normal.         Judgment: Judgment normal.

## 2023-10-25 ENCOUNTER — ALLIED HEALTH/NURSE VISIT (OUTPATIENT)
Dept: FAMILY MEDICINE | Facility: CLINIC | Age: 73
End: 2023-10-25
Payer: COMMERCIAL

## 2023-10-25 VITALS — SYSTOLIC BLOOD PRESSURE: 110 MMHG | HEART RATE: 99 BPM | DIASTOLIC BLOOD PRESSURE: 60 MMHG

## 2023-10-25 DIAGNOSIS — R03.0 ELEVATED BP WITHOUT DIAGNOSIS OF HYPERTENSION: Primary | ICD-10-CM

## 2023-10-25 PROCEDURE — 99207 PR NO CHARGE NURSE ONLY: CPT

## 2023-10-25 NOTE — PROGRESS NOTES
I met with Max Thompson at the request of self to recheck her blood pressure.  Blood pressure medications on the med list were reviewed with patient.    Patient has taken all medications as per usual regimen: Yes  Patient reports tolerating them without any issues or concerns: Yes    Vitals:    10/25/23 1402   BP: 110/60   Pulse: 99       Blood pressure was taken, previous encounter was reviewed, recorded blood pressure below 140/90.  Patient was discharged and the note will be sent to the provider for final review.

## 2023-11-15 ENCOUNTER — TELEPHONE (OUTPATIENT)
Dept: FAMILY MEDICINE | Facility: CLINIC | Age: 73
End: 2023-11-15
Payer: COMMERCIAL

## 2023-11-15 NOTE — TELEPHONE ENCOUNTER
Patient Quality Outreach    Patient is due for the following:   Depression  -  PHQ-9 needed    Next Steps:   No follow up needed at this time. Upcoming appointment     Type of outreach:    Chart review performed, no outreach needed.    Next Steps:  Reach out within 90 days via Phone.    Max number of attempts reached: No. Will try again in 90 days if patient still on fail list.    Questions for provider review:    None           Penelope Mckeon MA  Chart routed to Care Team.

## 2023-12-12 ENCOUNTER — OFFICE VISIT (OUTPATIENT)
Dept: FAMILY MEDICINE | Facility: CLINIC | Age: 73
End: 2023-12-12
Payer: COMMERCIAL

## 2023-12-12 VITALS
WEIGHT: 156.8 LBS | HEIGHT: 66 IN | TEMPERATURE: 98.5 F | DIASTOLIC BLOOD PRESSURE: 84 MMHG | RESPIRATION RATE: 16 BRPM | BODY MASS INDEX: 25.2 KG/M2 | SYSTOLIC BLOOD PRESSURE: 135 MMHG | HEART RATE: 90 BPM | OXYGEN SATURATION: 99 %

## 2023-12-12 DIAGNOSIS — E78.5 HYPERLIPIDEMIA, UNSPECIFIED HYPERLIPIDEMIA TYPE: ICD-10-CM

## 2023-12-12 DIAGNOSIS — Z01.818 PREOP GENERAL PHYSICAL EXAM: Primary | ICD-10-CM

## 2023-12-12 DIAGNOSIS — Z23 NEED FOR TDAP VACCINATION: ICD-10-CM

## 2023-12-12 DIAGNOSIS — M17.11 PRIMARY OSTEOARTHRITIS OF RIGHT KNEE: ICD-10-CM

## 2023-12-12 DIAGNOSIS — K21.9 GASTROESOPHAGEAL REFLUX DISEASE WITHOUT ESOPHAGITIS: ICD-10-CM

## 2023-12-12 PROBLEM — F17.211 CIGARETTE NICOTINE DEPENDENCE IN REMISSION: Status: ACTIVE | Noted: 2019-01-21

## 2023-12-12 LAB
ANION GAP SERPL CALCULATED.3IONS-SCNC: 9 MMOL/L (ref 7–15)
BUN SERPL-MCNC: 20.9 MG/DL (ref 8–23)
CALCIUM SERPL-MCNC: 9.7 MG/DL (ref 8.8–10.2)
CHLORIDE SERPL-SCNC: 104 MMOL/L (ref 98–107)
CREAT SERPL-MCNC: 0.85 MG/DL (ref 0.51–0.95)
DEPRECATED HCO3 PLAS-SCNC: 26 MMOL/L (ref 22–29)
EGFRCR SERPLBLD CKD-EPI 2021: 71 ML/MIN/1.73M2
ERYTHROCYTE [DISTWIDTH] IN BLOOD BY AUTOMATED COUNT: 12.6 % (ref 10–15)
GLUCOSE SERPL-MCNC: 95 MG/DL (ref 70–99)
HCT VFR BLD AUTO: 43.4 % (ref 35–47)
HGB BLD-MCNC: 14.1 G/DL (ref 11.7–15.7)
MCH RBC QN AUTO: 30.8 PG (ref 26.5–33)
MCHC RBC AUTO-ENTMCNC: 32.5 G/DL (ref 31.5–36.5)
MCV RBC AUTO: 95 FL (ref 78–100)
PLATELET # BLD AUTO: 247 10E3/UL (ref 150–450)
POTASSIUM SERPL-SCNC: 4.7 MMOL/L (ref 3.4–5.3)
RBC # BLD AUTO: 4.58 10E6/UL (ref 3.8–5.2)
SODIUM SERPL-SCNC: 139 MMOL/L (ref 135–145)
WBC # BLD AUTO: 5.3 10E3/UL (ref 4–11)

## 2023-12-12 PROCEDURE — 99214 OFFICE O/P EST MOD 30 MIN: CPT | Performed by: FAMILY MEDICINE

## 2023-12-12 PROCEDURE — 36415 COLL VENOUS BLD VENIPUNCTURE: CPT | Performed by: FAMILY MEDICINE

## 2023-12-12 PROCEDURE — 85027 COMPLETE CBC AUTOMATED: CPT | Performed by: FAMILY MEDICINE

## 2023-12-12 PROCEDURE — 80048 BASIC METABOLIC PNL TOTAL CA: CPT | Performed by: FAMILY MEDICINE

## 2023-12-12 RX ORDER — BUPROPION HYDROCHLORIDE 150 MG/1
150 TABLET ORAL 2 TIMES DAILY
COMMUNITY
End: 2024-03-26

## 2023-12-12 RX ORDER — RESPIRATORY SYNCYTIAL VIRUS VACCINE 120MCG/0.5
0.5 KIT INTRAMUSCULAR ONCE
Qty: 1 EACH | Refills: 0 | Status: CANCELLED | OUTPATIENT
Start: 2023-12-12 | End: 2023-12-12

## 2023-12-12 ASSESSMENT — PATIENT HEALTH QUESTIONNAIRE - PHQ9
SUM OF ALL RESPONSES TO PHQ QUESTIONS 1-9: 2
10. IF YOU CHECKED OFF ANY PROBLEMS, HOW DIFFICULT HAVE THESE PROBLEMS MADE IT FOR YOU TO DO YOUR WORK, TAKE CARE OF THINGS AT HOME, OR GET ALONG WITH OTHER PEOPLE: NOT DIFFICULT AT ALL
SUM OF ALL RESPONSES TO PHQ QUESTIONS 1-9: 2

## 2023-12-12 NOTE — PATIENT INSTRUCTIONS
Preparing for Your Surgery  Getting started  A nurse will call you to review your health history and instructions. They will give you an arrival time based on your scheduled surgery time. Please be ready to share:  Your doctor's clinic name and phone number  Your medical, surgical, and anesthesia history  A list of allergies and sensitivities  A list of medicines, including herbal treatments and over-the-counter drugs  Whether the patient has a legal guardian (ask how to send us the papers in advance)  Please tell us if you're pregnant--or if there's any chance you might be pregnant. Some surgeries may injure a fetus (unborn baby), so they require a pregnancy test. Surgeries that are safe for a fetus don't always need a test, and you can choose whether to have one.   If you have a child who's having surgery, please ask for a copy of Preparing for Your Child's Surgery.    Preparing for surgery  Within 10 to 30 days of surgery: Have a pre-op exam (sometimes called an H&P, or History and Physical). This can be done at a clinic or pre-operative center.  If you're having a , you may not need this exam. Talk to your care team.  At your pre-op exam, talk to your care team about all medicines you take. If you need to stop any medicines before surgery, ask when to start taking them again.  We do this for your safety. Many medicines can make you bleed too much during surgery. Some change how well surgery (anesthesia) drugs work.  Call your insurance company to let them know you're having surgery. (If you don't have insurance, call [phone].)  Call your clinic if there's any change in your health. This includes signs of a cold or flu (sore throat, runny nose, cough, rash, fever). It also includes a scrape or scratch near the surgery site.  If you have questions on the day of surgery, call your hospital or surgery center.  Eating and drinking guidelines  For your safety: Unless your surgeon tells you otherwise,  follow the guidelines below.  Eat and drink as usual until 8 hours before you arrive for surgery. After that, no food or milk.  Drink clear liquids until 2 hours before you arrive. These are liquids you can see through, like water, Gatorade, and Propel Water. They also include plain black coffee and tea (no cream or milk), candy, and breath mints. You can spit out gum when you arrive.  If you drink alcohol: Stop drinking it the night before surgery.  If your care team tells you to take medicine on the morning of surgery, it's okay to take it with a sip of water.  Preventing infection  Shower or bathe the night before and morning of your surgery. Follow the instructions your clinic gave you. (If no instructions, use regular soap.)  Don't shave or clip hair near your surgery site. We'll remove the hair if needed.  Don't smoke or vape the morning of surgery. You may chew nicotine gum up to 2 hours before surgery. A nicotine patch is okay.  Note: Some surgeries require you to completely quit smoking and nicotine. Check with your surgeon.  Your care team will make every effort to keep you safe from infection. We will:  Clean our hands often with soap and water (or an alcohol-based hand rub).  Clean the skin at your surgery site with a special soap that kills germs.  Give you a special gown to keep you warm. (Cold raises the risk of infection.)  Wear special hair covers, masks, gowns and gloves during surgery.  Give antibiotic medicine, if prescribed. Not all surgeries need antibiotics.  What to bring on the day of surgery  Photo ID and insurance card  Copy of your health care directive, if you have one  Glasses and hearing aids (bring cases)  You can't wear contacts during surgery  Inhaler and eye drops, if you use them (tell us about these when you arrive)  CPAP machine or breathing device, if you use them  A few personal items, if spending the night  If you have . . .  A pacemaker, ICD (cardiac defibrillator) or other  implant: Bring the ID card.  An implanted stimulator: Bring the remote control.  A legal guardian: Bring a copy of the certified (court-stamped) guardianship papers.  Please remove any jewelry, including body piercings. Leave jewelry and other valuables at home.  If you're going home the day of surgery  You must have a responsible adult drive you home. They should stay with you overnight as well.  If you don't have someone to stay with you, and you aren't safe to go home alone, we may keep you overnight. Insurance often won't pay for this.  After surgery  If it's hard to control your pain or you need more pain medicine, please call your surgeon's office.  Questions?   If you have any questions for your care team, list them here: _________________________________________________________________________________________________________________________________________________________________________ ____________________________________ ____________________________________ ____________________________________  For informational purposes only. Not to replace the advice of your health care provider. Copyright   2003, 2019 [facility]. All rights reserved. Clinically reviewed by Sigrid Squires MD. [employer] 959050 - REV 12/22.    How to Take Your Medication Before Surgery  - Take all of your medications before surgery as usual  Do not take ibuprofen/Advil, or Aleve 1 week prior to surgery  You may take Tylenol as needed  You may take your other medications as well  Take aspirin as recommended by Dr. Rosen

## 2023-12-12 NOTE — PROGRESS NOTES
[facility]  [address] [facility] [address]  Phone: [phone]  Fax: [phone]  Primary Provider: Lorene Alexandre  Pre-op Performing Provider: LORENE ALEXANDRE      PREOPERATIVE EVALUATION:  Today's date: 12/12/2023    Luz is a 73 year old, presenting for the following:  Pre-Op Exam        12/12/2023     9:27 AM   Additional Questions   Roomed by Penelope FISCHER CMA       Surgical Information:  Surgery/Procedure: Right Knee Total Arthroplasty   Surgery Location: [facility] [place]   Surgeon: Dr. Rosen   Surgery Date: 1/10/2024  Time of Surgery: TBD  Where patient plans to recover: At home with family  Fax number for surgical facility: [phone]    ADDENDUM:    Surgery changed to 1/18/24  Patient approved for surgery    Assessment & Plan     The proposed surgical procedure is considered INTERMEDIATE risk.    Preop general physical exam  Primary osteoarthritis of right knee    Luz is approved for surgery  Check a basic metabolic panel and hemogram with platelets  Recommend holding NSAIDs at least 1 week prior to surgery  She will take aspirin as recommended by Dr. Rosen  Follow-up as recommended by Dr. Rosen    She plans on being discharged to home and has children who can provide care for her at home as needed following surgery    - Basic metabolic panel; Future  - CBC with platelets; Future    Gastroesophageal reflux disease without esophagitis    Continue lansoprazole    Hyperlipidemia, unspecified hyperlipidemia type    She will continue to work on diet and exercise  Recheck a lipid cascade in the future    Nicotine dependence, in remission    Patient has been able to quit tobacco with Wellbutrin  Continue Wellbutrin            - No identified additional risk factors other than previously addressed    Antiplatelet or Anticoagulation Medication Instructions:   - Patient is on no antiplatelet or anticoagulation medications.    Additional  Medication Instructions:  Patient is to take all scheduled medications on the day of surgery    RECOMMENDATION:  APPROVAL GIVEN to proceed with proposed procedure, without further diagnostic evaluation.    Review of external notes as documented elsewhere in note        Subjective       HPI related to upcoming procedure:     Luz is a pleasant 73-year-old female who presents to the clinic for preoperative evaluation.  She has a history of right knee pain which has not responded to conservative measures including cortisone injections.  As a result, she is a candidate for right total knee arthroplasty.    Her medical history is notable for hyperlipidemia, gastroesophageal reflux disease, and nicotine dependence.     She has had previous cataract surgery.     She continues to take atorvastatin 10 mg daily.  She also takes lansoprazole 30 mg daily for reflux symptoms which have generally been well controlled.  She has not been able to wean from her reflux medication.    Her vitamin D level has been low.    Overall, she is feeling well.  She is able to tolerate at least 4 METS of physical activity without difficulty.           12/12/2023     9:22 AM   Preop Questions   1. Have you ever had a heart attack or stroke? No   2. Have you ever had surgery on your heart or blood vessels, such as a stent placement, a coronary artery bypass, or surgery on an artery in your head, neck, heart, or legs? No   3. Do you have chest pain with activity? No   4. Do you have a history of  heart failure? No   5. Do you currently have a cold, bronchitis or symptoms of other infection? No   6. Do you have a cough, shortness of breath, or wheezing? No   7. Do you or anyone in your family have previous history of blood clots? No   8. Do you or does anyone in your family have a serious bleeding problem such as prolonged bleeding following surgeries or cuts? No   9. Have you ever had problems with anemia or been told to take iron pills? No   10.  Have you had any abnormal blood loss such as black, tarry or bloody stools, or abnormal vaginal bleeding? No   11. Have you ever had a blood transfusion? No   12. Are you willing to have a blood transfusion if it is medically needed before, during, or after your surgery? Yes   13. Have you or any of your relatives ever had problems with anesthesia? No   14. Do you have sleep apnea, excessive snoring or daytime drowsiness? No   15. Do you have any artifical heart valves or other implanted medical devices like a pacemaker, defibrillator, or continuous glucose monitor? No   16. Do you have artificial joints? No   17. Are you allergic to latex? No       Health Care Directive:  Patient does not have a Health Care Directive or Living Will: Advance Directive received and scanned. Click on Code in the patient header to view.    Preoperative Review of :   reviewed - no record of controlled substances prescribed.      Status of Chronic Conditions:  See problem list for active medical problems.  Problems all longstanding and stable, except as noted/documented.  See ROS for pertinent symptoms related to these conditions.    Review of Systems  Constitutional, neuro, ENT, endocrine, pulmonary, cardiac, gastrointestinal, genitourinary, musculoskeletal, integument and psychiatric systems are negative, except as otherwise noted.    Patient Active Problem List    Diagnosis Date Noted    Moderate major depression (H) 05/20/2021     Priority: Medium    Anxiety 11/05/2020     Priority: Medium    Cigarette nicotine dependence without complication 01/21/2019     Priority: Medium    Vitamin D deficiency 12/02/2016     Priority: Medium    Hyperlipidemia      Priority: Medium     Created by Conversion        Anxiety      Priority: Medium     Created by Conversion  Replacement Utility updated for latest IMO load        Osteopenia      Priority: Medium     Created by Conversion  Replacement Utility updated for latest IMO load         "Esophageal Reflux      Priority: Medium     Created by Conversion          Past Medical History:   Diagnosis Date    Breast cyst 2013     No past surgical history on file.  Current Outpatient Medications   Medication Sig Dispense Refill    atorvastatin (LIPITOR) 10 MG tablet TAKE 1 TABLET BY MOUTH EVERY DAY 90 tablet 3    buPROPion (WELLBUTRIN XL) 150 MG 24 hr tablet Take 150 mg by mouth 2 times daily      LANsoprazole (PREVACID) 30 MG DR capsule TAKE 1 CAPSULE BY MOUTH EVERY DAY 90 capsule 3    LORazepam (ATIVAN) 1 MG tablet TAKE 1 TABLET BY MOUTH DAILY AS NEEDED FOR ANXIETY 30 tablet 0       No Known Allergies     Social History     Tobacco Use    Smoking status: Former     Packs/day: .5     Types: Cigarettes    Smokeless tobacco: Never    Tobacco comments:     quit 5/2020   Substance Use Topics    Alcohol use: Not on file     Comment: Occasionally     Family History   Problem Relation Age of Onset    Cancer Son     No Known Problems Mother     No Known Problems Father     No Known Problems Sister     No Known Problems Daughter     No Known Problems Maternal Grandmother     No Known Problems Maternal Grandfather     No Known Problems Paternal Grandmother     No Known Problems Paternal Grandfather     No Known Problems Maternal Aunt     No Known Problems Paternal Aunt     Hereditary Breast and Ovarian Cancer Syndrome No family hx of     Breast Cancer No family hx of     Cancer No family hx of     Colon Cancer No family hx of     Endometrial Cancer No family hx of     Ovarian Cancer No family hx of      History   Drug Use Not on file         Objective     /84 (BP Location: Left arm, Patient Position: Sitting, Cuff Size: Adult Regular)   Pulse 90   Temp 98.5  F (36.9  C) (Oral)   Resp 16   Ht 1.664 m (5' 5.5\")   Wt 71.1 kg (156 lb 12.8 oz)   LMP  (LMP Unknown)   SpO2 99%   BMI 25.70 kg/m      Physical Exam    GENERAL APPEARANCE: healthy, alert and no distress     EYES: EOMI, PERRL     RESP: lungs clear " to auscultation - no rales, rhonchi or wheezes     CV: regular rates and rhythm, normal S1 S2, no S3 or S4 and no murmur, click or rub     MS: extremities normal- no gross deformities noted, no evidence of inflammation in joints, FROM in all extremities.     PSYCH: mentation appears normal. and affect normal/bright     LYMPHATICS: No cervical adenopathy    Recent Labs   Lab Test 06/21/23  1229 04/29/22  1015   HGB 14.4 14.5    227    143   POTASSIUM 4.3 4.4   CR 0.75 0.76        Diagnostics:  No results found for this or any previous visit (from the past 168 hour(s)).   No EKG required, no history of coronary heart disease, significant arrhythmia, peripheral arterial disease or other structural heart disease.    Revised Cardiac Risk Index (RCRI):  The patient has the following serious cardiovascular risks for perioperative complications:   - No serious cardiac risks = 0 points     RCRI Interpretation: 0 points: Class I (very low risk - 0.4% complication rate)         Signed Electronically by: Michele Deleon MD  Copy of this evaluation report is provided to requesting physician.      Answers submitted by the patient for this visit:  Patient Health Questionnaire (Submitted on 12/12/2023)  If you checked off any problems, how difficult have these problems made it for you to do your work, take care of things at home, or get along with other people?: Not difficult at all  PHQ9 TOTAL SCORE: 2

## 2023-12-15 ENCOUNTER — DOCUMENTATION ONLY (OUTPATIENT)
Dept: OTHER | Facility: CLINIC | Age: 73
End: 2023-12-15
Payer: COMMERCIAL

## 2024-01-17 ENCOUNTER — TELEPHONE (OUTPATIENT)
Dept: FAMILY MEDICINE | Facility: CLINIC | Age: 74
End: 2024-01-17
Payer: COMMERCIAL

## 2024-01-17 NOTE — TELEPHONE ENCOUNTER
General Call    Contacts         Type Contact Phone/Fax    01/17/2024 11:31 AM CST Phone (Incoming) Route, Luz RAYMOND (Self) [phone] (M)          Reason for Call:  please addend the last pre-op for her right knee that was done on 12-. She told me they moved the surgery date on her and this was moved to tomorrow 1-.  Please advise.    Total right knee replacement  Procedure to be done 1-  At [facility] in [place]  With Dr. Rosen      Okay to leave a detailed message?: Yes at Cell number on file:    Telephone Information:   Mobile [phone]

## 2024-01-18 ENCOUNTER — TRANSFERRED RECORDS (OUTPATIENT)
Dept: HEALTH INFORMATION MANAGEMENT | Facility: CLINIC | Age: 74
End: 2024-01-18
Payer: COMMERCIAL

## 2024-01-24 ENCOUNTER — TRANSFERRED RECORDS (OUTPATIENT)
Dept: HEALTH INFORMATION MANAGEMENT | Facility: CLINIC | Age: 74
End: 2024-01-24
Payer: COMMERCIAL

## 2024-02-10 ENCOUNTER — TRANSFERRED RECORDS (OUTPATIENT)
Dept: HEALTH INFORMATION MANAGEMENT | Facility: CLINIC | Age: 74
End: 2024-02-10
Payer: COMMERCIAL

## 2024-02-12 ENCOUNTER — TRANSFERRED RECORDS (OUTPATIENT)
Dept: HEALTH INFORMATION MANAGEMENT | Facility: CLINIC | Age: 74
End: 2024-02-12
Payer: COMMERCIAL

## 2024-02-26 ENCOUNTER — TRANSFERRED RECORDS (OUTPATIENT)
Dept: HEALTH INFORMATION MANAGEMENT | Facility: CLINIC | Age: 74
End: 2024-02-26
Payer: COMMERCIAL

## 2024-03-20 ENCOUNTER — TRANSFERRED RECORDS (OUTPATIENT)
Dept: HEALTH INFORMATION MANAGEMENT | Facility: CLINIC | Age: 74
End: 2024-03-20
Payer: COMMERCIAL

## 2024-03-26 DIAGNOSIS — F32.9 MAJOR DEPRESSIVE DISORDER, REMISSION STATUS UNSPECIFIED, UNSPECIFIED WHETHER RECURRENT: Primary | ICD-10-CM

## 2024-03-28 RX ORDER — BUPROPION HYDROCHLORIDE 150 MG/1
150 TABLET ORAL 2 TIMES DAILY
Qty: 180 TABLET | Refills: 1 | Status: SHIPPED | OUTPATIENT
Start: 2024-03-28 | End: 2024-09-25

## 2024-05-15 ENCOUNTER — TRANSFERRED RECORDS (OUTPATIENT)
Dept: HEALTH INFORMATION MANAGEMENT | Facility: CLINIC | Age: 74
End: 2024-05-15
Payer: COMMERCIAL

## 2024-05-23 ENCOUNTER — PATIENT OUTREACH (OUTPATIENT)
Dept: CARE COORDINATION | Facility: CLINIC | Age: 74
End: 2024-05-23
Payer: COMMERCIAL

## 2024-06-06 ENCOUNTER — PATIENT OUTREACH (OUTPATIENT)
Dept: CARE COORDINATION | Facility: CLINIC | Age: 74
End: 2024-06-06
Payer: COMMERCIAL

## 2024-06-19 DIAGNOSIS — E78.5 HYPERLIPIDEMIA, UNSPECIFIED: ICD-10-CM

## 2024-06-19 DIAGNOSIS — K21.9 GASTRO-ESOPHAGEAL REFLUX DISEASE WITHOUT ESOPHAGITIS: ICD-10-CM

## 2024-06-19 RX ORDER — LANSOPRAZOLE 30 MG/1
CAPSULE, DELAYED RELEASE ORAL
Qty: 90 CAPSULE | Refills: 1 | Status: SHIPPED | OUTPATIENT
Start: 2024-06-19

## 2024-06-19 RX ORDER — ATORVASTATIN CALCIUM 10 MG/1
TABLET, FILM COATED ORAL
Qty: 90 TABLET | Refills: 1 | Status: SHIPPED | OUTPATIENT
Start: 2024-06-19

## 2024-07-31 ENCOUNTER — OFFICE VISIT (OUTPATIENT)
Dept: FAMILY MEDICINE | Facility: CLINIC | Age: 74
End: 2024-07-31
Payer: COMMERCIAL

## 2024-07-31 VITALS
DIASTOLIC BLOOD PRESSURE: 70 MMHG | HEART RATE: 85 BPM | RESPIRATION RATE: 20 BRPM | OXYGEN SATURATION: 98 % | SYSTOLIC BLOOD PRESSURE: 115 MMHG | TEMPERATURE: 98.3 F

## 2024-07-31 DIAGNOSIS — B02.9 HERPES ZOSTER WITHOUT COMPLICATION: Primary | ICD-10-CM

## 2024-07-31 PROCEDURE — 99213 OFFICE O/P EST LOW 20 MIN: CPT | Performed by: PHYSICIAN ASSISTANT

## 2024-07-31 RX ORDER — VALACYCLOVIR HYDROCHLORIDE 1 G/1
1000 TABLET, FILM COATED ORAL 3 TIMES DAILY
Qty: 21 TABLET | Refills: 0 | Status: SHIPPED | OUTPATIENT
Start: 2024-07-31 | End: 2024-08-07

## 2024-07-31 NOTE — PROGRESS NOTES
Assessment & Plan     1. Herpes zoster without complication  I suspect that the rash is secondary to shingles, erythematous papules on an erythematous base, at this point no vesicles are noted.  She does have tingling sensation where the rash is not located, making me more suspicious for shingles.  I think less likely an atopic dermatitis, fungal infection, insect bite, impetigo/superficial skin infection etc.  I will have her start taking valacyclovir  Covered with Vaseline.  - valACYclovir (VALTREX) 1000 mg tablet; Take 1 tablet (1,000 mg) by mouth 3 times daily for 7 days  Dispense: 21 tablet; Refill: 0        Return in about 1 week (around 8/7/2024), or if symptoms worsen or fail to improve.    Diagnosis and treatment plan was reviewed with patient and/or family.   We went over any labs or imaging. Discussed worsening symptoms or little to no relief despite treatment plan to follow-up with PCP or return to clinic.  Patient verbalizes understanding. All questions were addressed and answered.     Yenifer Lucia PA-C  [facility]    CHIEF COMPLAINT:   Chief Complaint   Patient presents with    Derm Problem     Rt hip rash x last night. Hx shingles. Itching and burning sensation     Subjective     Luz is a 73 year old female who presents to clinic today for evaluation of rash.  Symptoms started yesterday, noticed it on the right lower back.  Complains of a burning and itching sensation.  No new products or detergents.  She has not been around others with the same rash.  History of shingles in the past, approximately 20 to 25 years ago.      Past Medical History:   Diagnosis Date    Breast cyst 2013     No past surgical history on file.  Social History     Tobacco Use    Smoking status: Former     Current packs/day: 0.50     Types: Cigarettes     Passive exposure: Never    Smokeless tobacco: Never    Tobacco comments:     quit 5/2020   Substance Use Topics    Alcohol use: Not on file      Comment: Occasionally     Current Outpatient Medications   Medication Sig Dispense Refill    atorvastatin (LIPITOR) 10 MG tablet TAKE 1 TABLET BY MOUTH EVERY DAY 90 tablet 1    buPROPion (WELLBUTRIN XL) 150 MG 24 hr tablet Take 1 tablet (150 mg) by mouth 2 times daily 180 tablet 1    LANsoprazole (PREVACID) 30 MG DR capsule TAKE 1 CAPSULE BY MOUTH EVERY DAY 90 capsule 1    LORazepam (ATIVAN) 1 MG tablet TAKE 1 TABLET BY MOUTH DAILY AS NEEDED FOR ANXIETY 30 tablet 0    valACYclovir (VALTREX) 1000 mg tablet Take 1 tablet (1,000 mg) by mouth 3 times daily for 7 days 21 tablet 0     No current facility-administered medications for this visit.     No Known Allergies    10 point ROS of systems were all negative except for pertinent positives noted in my HPI.      Exam:   /70   Pulse 85   Temp 98.3  F (36.8  C) (Tympanic)   Resp 20   LMP  (LMP Unknown)   SpO2 98%   Constitutional: healthy, alert and no distress  Head: Normocephalic, atraumatic.  Skin: On the right lower back region, she has erythematous papules in a cluster on an erythematous base. She has tingling and itchy sensation in the area of the rash and not necessarily over the rash  Neurologic: Speech clear, gait normal. Moves all extremities.    No results found for any visits on 07/31/24.

## 2024-08-28 ENCOUNTER — PATIENT OUTREACH (OUTPATIENT)
Dept: CARE COORDINATION | Facility: CLINIC | Age: 74
End: 2024-08-28
Payer: COMMERCIAL

## 2024-09-17 ENCOUNTER — OFFICE VISIT (OUTPATIENT)
Dept: FAMILY MEDICINE | Facility: CLINIC | Age: 74
End: 2024-09-17
Payer: COMMERCIAL

## 2024-09-17 VITALS
TEMPERATURE: 97.7 F | RESPIRATION RATE: 20 BRPM | HEART RATE: 76 BPM | SYSTOLIC BLOOD PRESSURE: 131 MMHG | BODY MASS INDEX: 26.52 KG/M2 | WEIGHT: 165 LBS | DIASTOLIC BLOOD PRESSURE: 74 MMHG | OXYGEN SATURATION: 98 % | HEIGHT: 66 IN

## 2024-09-17 DIAGNOSIS — Z00.00 ENCOUNTER FOR MEDICARE ANNUAL WELLNESS EXAM: Primary | ICD-10-CM

## 2024-09-17 DIAGNOSIS — E78.00 HYPERCHOLESTEROLEMIA: ICD-10-CM

## 2024-09-17 DIAGNOSIS — M94.9 DISORDER OF BONE AND CARTILAGE: ICD-10-CM

## 2024-09-17 DIAGNOSIS — K21.9 GASTROESOPHAGEAL REFLUX DISEASE WITHOUT ESOPHAGITIS: ICD-10-CM

## 2024-09-17 DIAGNOSIS — M89.9 DISORDER OF BONE AND CARTILAGE: ICD-10-CM

## 2024-09-17 DIAGNOSIS — Z78.0 POST-MENOPAUSAL: ICD-10-CM

## 2024-09-17 DIAGNOSIS — F41.9 ANXIETY: ICD-10-CM

## 2024-09-17 DIAGNOSIS — E55.9 VITAMIN D DEFICIENCY: ICD-10-CM

## 2024-09-17 DIAGNOSIS — F17.210 CIGARETTE NICOTINE DEPENDENCE WITHOUT COMPLICATION: ICD-10-CM

## 2024-09-17 DIAGNOSIS — Z12.31 ENCOUNTER FOR SCREENING MAMMOGRAM FOR BREAST CANCER: ICD-10-CM

## 2024-09-17 LAB
ERYTHROCYTE [DISTWIDTH] IN BLOOD BY AUTOMATED COUNT: 12.9 % (ref 10–15)
HCT VFR BLD AUTO: 42.4 % (ref 35–47)
HGB BLD-MCNC: 13.7 G/DL (ref 11.7–15.7)
MCH RBC QN AUTO: 31.1 PG (ref 26.5–33)
MCHC RBC AUTO-ENTMCNC: 32.3 G/DL (ref 31.5–36.5)
MCV RBC AUTO: 96 FL (ref 78–100)
PLATELET # BLD AUTO: 238 10E3/UL (ref 150–450)
RBC # BLD AUTO: 4.41 10E6/UL (ref 3.8–5.2)
WBC # BLD AUTO: 5.8 10E3/UL (ref 4–11)

## 2024-09-17 PROCEDURE — 85027 COMPLETE CBC AUTOMATED: CPT | Performed by: FAMILY MEDICINE

## 2024-09-17 PROCEDURE — 36415 COLL VENOUS BLD VENIPUNCTURE: CPT | Performed by: FAMILY MEDICINE

## 2024-09-17 PROCEDURE — 82306 VITAMIN D 25 HYDROXY: CPT | Performed by: FAMILY MEDICINE

## 2024-09-17 PROCEDURE — 82248 BILIRUBIN DIRECT: CPT | Performed by: FAMILY MEDICINE

## 2024-09-17 PROCEDURE — 99213 OFFICE O/P EST LOW 20 MIN: CPT | Mod: 25 | Performed by: FAMILY MEDICINE

## 2024-09-17 PROCEDURE — 80061 LIPID PANEL: CPT | Performed by: FAMILY MEDICINE

## 2024-09-17 PROCEDURE — 80053 COMPREHEN METABOLIC PANEL: CPT | Performed by: FAMILY MEDICINE

## 2024-09-17 PROCEDURE — G0439 PPPS, SUBSEQ VISIT: HCPCS | Performed by: FAMILY MEDICINE

## 2024-09-17 RX ORDER — LORAZEPAM 1 MG/1
TABLET ORAL
Qty: 30 TABLET | Refills: 0 | Status: SHIPPED | OUTPATIENT
Start: 2024-09-17

## 2024-09-17 ASSESSMENT — PATIENT HEALTH QUESTIONNAIRE - PHQ9
SUM OF ALL RESPONSES TO PHQ QUESTIONS 1-9: 4
10. IF YOU CHECKED OFF ANY PROBLEMS, HOW DIFFICULT HAVE THESE PROBLEMS MADE IT FOR YOU TO DO YOUR WORK, TAKE CARE OF THINGS AT HOME, OR GET ALONG WITH OTHER PEOPLE: NOT DIFFICULT AT ALL
SUM OF ALL RESPONSES TO PHQ QUESTIONS 1-9: 4

## 2024-09-17 NOTE — PROGRESS NOTES
"Preventive Care Visit  [facility]  Michele Deleon MD, Family Medicine  Sep 17, 2024      Assessment & Plan     Encounter for Medicare annual wellness exam    Reviewed vaccines    Hypercholesterolemia    Check a lipid cascade  Continue atorvastatin    - Hepatic function panel; Future  - Lipid panel reflex to direct LDL Fasting; Future  - Hepatic function panel  - Lipid panel reflex to direct LDL Fasting    Anxiety    Reviewed her history of anxiety  She will take lorazepam sparingly  Reviewed risks of sedation and habit formation  She prefers not to take a daily medication    - LORazepam (ATIVAN) 1 MG tablet; TAKE 1 TABLET BY MOUTH DAILY AS NEEDED FOR ANXIETY    Osteopenia  Post-menopausal    - DX Bone Density; Future      Recommend adequate calcium and vitamin D  Recommend weight bearing exercise  Refer for a bone density test    - Basic metabolic panel; Future  - CBC with platelets; Future  - DX Bone Density; Future  - Basic metabolic panel  - CBC with platelets    Cigarette nicotine dependence without complication    She quit tobacco for 5 months  Continue to work on cessation      Vitamin D deficiency    - Vitamin D Deficiency; Future  - Vitamin D Deficiency    Gastroesophageal reflux disease without esophagitis    Continue lansoprazole    Encounter for screening mammogram for breast cancer    Refer for a mammogram    - MA Screen Bilateral w/Julio César; Future            BMI  Estimated body mass index is 27.04 kg/m  as calculated from the following:    Height as of this encounter: 1.664 m (5' 5.5\").    Weight as of this encounter: 74.8 kg (165 lb).   Weight management plan: Discussed healthy diet and exercise guidelines    Counseling  Appropriate preventive services were addressed with this patient via screening, questionnaire, or discussion as appropriate for fall prevention, nutrition, physical activity, Tobacco-use cessation, social engagement, weight loss and cognition.  Checklist " reviewing preventive services available has been given to the patient.  Reviewed patient's diet, addressing concerns and/or questions.   She is at risk for lack of exercise and has been provided with information to increase physical activity for the benefit of her well-being.   She is at risk for psychosocial distress and has been provided with information to reduce risk.   Discussed possible causes of fatigue. Information on urinary incontinence and treatment options given to patient.           Kodi Escobar is a 74 year old, presenting for the following:  Wellness Visit        9/17/2024    12:50 PM   Additional Questions   Roomed by Penelope FISCHER CMA         Health Care Directive  Patient has a Health Care Directive on file  Advance care planning document is on file and is current.    NELSON Escobar presents for a wellness visit and for a medication check.      Her medical history is notable for hyperlipidemia, gastroesophageal reflux disease, and nicotine dependence.     More recent history is notable for cataracts affecting her vision. She was a candidate for surgery.      She continues to take atorvastatin 10 mg daily.  She also takes lansoprazole 30 mg daily.  Her symptoms have generally been well controlled.  She has not been able to wean from her reflux medication.     In the past couple of years her  Eusebio passed away. This has been a challenging time as she continues to grieve his loss. She will take occasional lorazepam for anxiety. She has preferred not to take a daily medication.    She quit tobacco for 5 months but will smoke on occasion.     She had knee replacement surgery in early 2024 for her right knee.                  9/17/2024   General Health   How would you rate your overall physical health? Good   Feel stress (tense, anxious, or unable to sleep) Only a little      (!) STRESS CONCERN      9/17/2024   Nutrition   Diet: Regular (no restrictions)            9/17/2024   Exercise   Days per  week of moderate/strenous exercise 2 days      (!) EXERCISE CONCERN      9/17/2024   Social Factors   Frequency of gathering with friends or relatives Once a week   Worry food won't last until get money to buy more No   Food not last or not have enough money for food? No   Do you have housing? (Housing is defined as stable permanent housing and does not include staying ouside in a car, in a tent, in an abandoned building, in an overnight shelter, or couch-surfing.) Yes   Are you worried about losing your housing? No   Lack of transportation? No   Unable to get utilities (heat,electricity)? No            9/17/2024   Fall Risk   Fallen 2 or more times in the past year? No   Trouble with walking or balance? Yes   Gait Speed Test (Document in seconds) 5   Gait Speed Test Interpretation Less than or equal to 5.00 seconds - PASS    Less than or equal to 5.00 seconds - PASS       Multiple values from one day are sorted in reverse-chronological order          9/17/2024   Activities of Daily Living- Home Safety   Needs help with the following daily activites None of the above   Safety concerns in the home None of the above            9/17/2024   Dental   Dentist two times every year? Yes            9/17/2024   Hearing Screening   Hearing concerns? None of the above            9/17/2024   Driving Risk Screening   Patient/family members have concerns about driving No            9/17/2024   General Alertness/Fatigue Screening   Have you been more tired than usual lately? (!) YES            9/17/2024   Urinary Incontinence Screening   Bothered by leaking urine in past 6 months Yes            9/17/2024   TB Screening   Were you born outside of the [place]? No          Today's PHQ-9 Score:       9/17/2024    12:34 PM   PHQ-9 SCORE   PHQ-9 Total Score MyChart 4 (Minimal depression)   PHQ-9 Total Score 4         9/17/2024   Substance Use   Alcohol more than 3/day or more than 7/wk No   Do you have a current opioid prescription? No   How  severe/bad is pain from 1 to 10? 0/10 (No Pain)   Do you use any other substances recreationally? No        Social History     Tobacco Use    Smoking status: Former     Current packs/day: 0.50     Types: Cigarettes     Passive exposure: Never    Smokeless tobacco: Never    Tobacco comments:     quit 5/2020   Vaping Use    Vaping status: Never Used           7/26/2022   LAST FHS-7 RESULTS   1st degree relative breast or ovarian cancer No   Any relative bilateral breast cancer No   Any male have breast cancer No   Any ONE woman have BOTH breast AND ovarian cancer No   Any woman with breast cancer before 50yrs No   2 or more relatives with breast AND/OR ovarian cancer No   2 or more relatives with breast AND/OR bowel cancer No           Mammogram Screening - After age 74- determine frequency with patient based on health status, life expectancy and patient goals    ASCVD Risk   The 10-year ASCVD risk score (Kamla MITCHELL, et al., 2019) is: 18.8%    Values used to calculate the score:      Age: 74 years      Sex: Female      Is Non- : No      Diabetic: No      Tobacco smoker: No      Systolic Blood Pressure: 131 mmHg      Is BP treated: No      HDL Cholesterol: 66 mg/dL      Total Cholesterol: 206 mg/dL            Reviewed and updated as needed this visit by Provider                    Past Medical History:   Diagnosis Date    Breast cyst 2013     History reviewed. No pertinent surgical history.  Current providers sharing in care for this patient include:  Patient Care Team:  Michele Deleon MD as PCP - General (Family Medicine)  Yeimi Sepulveda MD as PCP - Obstetrics/Gynecology  Pierre Herrera MD as MD (Neurology)  Michele Deleon MD as Assigned PCP    The following health maintenance items are reviewed in Epic and correct as of today:  Health Maintenance   Topic Date Due    DEPRESSION ACTION PLAN  Never done    LUNG CANCER SCREENING  Never done    DTAP/TDAP/TD  "IMMUNIZATION (2 - Td or Tdap) 01/12/2019    RSV VACCINE (1 - 1-dose 75+ series) Never done    MEDICARE ANNUAL WELLNESS VISIT  06/21/2024    LIPID  06/21/2024    INFLUENZA VACCINE (1) 09/01/2024    ANNUAL REVIEW OF HM ORDERS  12/12/2024    PHQ-9  03/17/2025    FALL RISK ASSESSMENT  09/17/2025    GLUCOSE  12/12/2026    ADVANCE CARE PLANNING  12/15/2028    DEXA  06/13/2029    Pneumococcal Vaccine: 65+ Years  Completed    HPV IMMUNIZATION  Aged Out    MENINGITIS IMMUNIZATION  Aged Out    RSV MONOCLONAL ANTIBODY  Aged Out    HEPATITIS C SCREENING  Discontinued    MAMMO SCREENING  Discontinued    COLORECTAL CANCER SCREENING  Discontinued    ZOSTER IMMUNIZATION  Discontinued    COVID-19 Vaccine  Discontinued         Review of Systems  Constitutional, HEENT, cardiovascular, pulmonary, gi and gu systems are negative, except as otherwise noted.     Objective    Exam  /74 (BP Location: Left arm, Patient Position: Sitting, Cuff Size: Adult Regular)   Pulse 76   Temp 97.7  F (36.5  C) (Oral)   Resp 20   Ht 1.664 m (5' 5.5\")   Wt 74.8 kg (165 lb)   LMP  (LMP Unknown)   SpO2 98%   BMI 27.04 kg/m     Estimated body mass index is 27.04 kg/m  as calculated from the following:    Height as of this encounter: 1.664 m (5' 5.5\").    Weight as of this encounter: 74.8 kg (165 lb).    Physical Exam  GENERAL: alert and no distress  EYES: Eyes grossly normal to inspection, PERRL and conjunctivae and sclerae normal  HENT: ear canals and TM's normal, nose and mouth without ulcers or lesions  NECK: no adenopathy, no asymmetry, masses, or scars  RESP: lungs clear to auscultation - no rales, rhonchi or wheezes  CV: regular rate and rhythm, normal S1 S2, no S3 or S4, no murmur, click or rub, no peripheral edema  MS: no gross musculoskeletal defects noted, no edema  SKIN: no suspicious lesions or rashes  PSYCH: mentation appears normal, affect normal/bright         9/17/2024   Mini Cog   Clock Draw Score 2 Normal   3 Item Recall 2 " objects recalled   Mini Cog Total Score 4                 Signed Electronically by: Michele Deleon MD    Answers submitted by the patient for this visit:  Patient Health Questionnaire (Submitted on 9/17/2024)  If you checked off any problems, how difficult have these problems made it for you to do your work, take care of things at home, or get along with other people?: Not difficult at all  PHQ9 TOTAL SCORE: 4

## 2024-09-17 NOTE — PATIENT INSTRUCTIONS
Patient Education     Luz,    It was great to see you.  We will check your laboratory testing today  I sent a refill for lorazepam to your pharmacy  I entered referrals for a mammogram and bone density test  You can consider the following vaccines:    COVID, influenza, RSV and tetanus  Make sure to get adequate calcium 1200 mg plus vitamin D    Michele Deleon MD      Preventive Care Advice   This is general advice given by our system to help you stay healthy. However, your care team may have specific advice just for you. Please talk to your care team about your preventive care needs.  Nutrition  Eat 5 or more servings of fruits and vegetables each day.  Try wheat bread, brown rice and whole grain pasta (instead of white bread, rice, and pasta).  Get enough calcium and vitamin D. Check the label on foods and aim for 100% of the RDA (recommended daily allowance).  Lifestyle  Exercise at least 150 minutes each week  (30 minutes a day, 5 days a week).  Do muscle strengthening activities 2 days a week. These help control your weight and prevent disease.  No smoking.  Wear sunscreen to prevent skin cancer.  Have a dental exam and cleaning every 6 months.  Yearly exams  See your health care team every year to talk about:  Any changes in your health.  Any medicines your care team has prescribed.  Preventive care, family planning, and ways to prevent chronic diseases.  Shots (vaccines)   HPV shots (up to age 26), if you've never had them before.  Hepatitis B shots (up to age 59), if you've never had them before.  COVID-19 shot: Get this shot when it's due.  Flu shot: Get a flu shot every year.  Tetanus shot: Get a tetanus shot every 10 years.  Pneumococcal, hepatitis A, and RSV shots: Ask your care team if you need these based on your risk.  Shingles shot (for age 50 and up)  General health tests  Diabetes screening:  Starting at age 35, Get screened for diabetes at least every 3 years.  If you are younger than age  35, ask your care team if you should be screened for diabetes.  Cholesterol test: At age 39, start having a cholesterol test every 5 years, or more often if advised.  Bone density scan (DEXA): At age 50, ask your care team if you should have this scan for osteoporosis (brittle bones).  Hepatitis C: Get tested at least once in your life.      Learning About Sleeping Well  What does sleeping well mean?     Sleeping well means getting enough sleep to feel good and stay healthy. How much sleep is enough varies among people.  The number of hours you sleep and how you feel when you wake up are both important. If you do not feel refreshed, you probably need more sleep. Another sign of not getting enough sleep is feeling tired during the day.  Experts recommend that adults get at least 7 or more hours of sleep per day. Children and older adults need more sleep.  Why is getting enough sleep important?  Getting enough quality sleep is a basic part of good health. When your sleep suffers, your physical health, mood, and your thoughts can suffer too. You may find yourself feeling more grumpy or stressed. Not getting enough sleep also can lead to serious problems, including injury, accidents, anxiety, and depression.  What might cause poor sleeping?  Many things can cause sleep problems, including:  Changes to your sleep schedule.  Stress. Stress can be caused by fear about a single event, such as giving a speech. Or you may have ongoing stress, such as worry about work or school.  Depression, anxiety, and other mental or emotional conditions.  Changes in your sleep habits or surroundings. This includes changes that happen where you sleep, such as noise, light, or sleeping in a different bed. It also includes changes in your sleep pattern, such as having jet lag or working a late shift.  Health problems, such as pain, breathing problems, and restless legs syndrome.  Lack of regular exercise.  Using alcohol, nicotine, or caffeine  "before bed.  How can you help yourself?  Here are some tips that may help you sleep more soundly and wake up feeling more refreshed.  Your sleeping area   Use your bedroom only for sleeping and sex. A bit of light reading may help you fall asleep. But if it doesn't, do your reading elsewhere in the house. Try not to use your TV, computer, smartphone, or tablet while you are in bed.  Be sure your bed is big enough to stretch out comfortably, especially if you have a sleep partner.  Keep your bedroom quiet, dark, and cool. Use curtains, blinds, or a sleep mask to block out light. To block out noise, use earplugs, soothing music, or a \"white noise\" machine.  Your evening and bedtime routine   Create a relaxing bedtime routine. You might want to take a warm shower or bath, or listen to soothing music.  Go to bed at the same time every night. And get up at the same time every morning, even if you feel tired.  What to avoid   Limit caffeine (coffee, tea, caffeinated sodas) during the day, and don't have any for at least 6 hours before bedtime.  Avoid drinking alcohol before bedtime. Alcohol can cause you to wake up more often during the night.  Try not to smoke or use tobacco, especially in the evening. Nicotine can keep you awake.  Limit naps during the day, especially close to bedtime.  Avoid lying in bed awake for too long. If you can't fall asleep or if you wake up in the middle of the night and can't get back to sleep within about 20 minutes, get out of bed and go to another room until you feel sleepy.  Avoid taking medicine right before bed that may keep you awake or make you feel hyper or energized. Your doctor can tell you if your medicine may do this and if you can take it earlier in the day.  If you can't sleep   Imagine yourself in a peaceful, pleasant scene. Focus on the details and feelings of being in a place that is relaxing.  Get up and do a quiet or boring activity until you feel sleepy.  Avoid drinking " "any liquids before going to bed to help prevent waking up often to use the bathroom.  Where can you learn more?  Go to https://www.[employer].net/patiented  Enter J942 in the search box to learn more about \"Learning About Sleeping Well.\"  Current as of: July 10, 2023  Content Version: 14.1 2006-2024 [employer].   Care instructions adapted under license by your healthcare professional. If you have questions about a medical condition or this instruction, always ask your healthcare professional. [employer] disclaims any warranty or liability for your use of this information.    Bladder Training: Care Instructions  Your Care Instructions     Bladder training is used to treat urge incontinence and stress incontinence. Urge incontinence means that the need to urinate comes on so fast that you can't get to a toilet in time. Stress incontinence means that you leak urine because of pressure on your bladder. For example, it may happen when you laugh, cough, or lift something heavy.  Bladder training can increase how long you can wait before you have to urinate. It can also help your bladder hold more urine. And it can give you better control over the urge to urinate.  It is important to remember that bladder training takes a few weeks to a few months to make a difference. You may not see results right away, but don't give up.  Follow-up care is a key part of your treatment and safety. Be sure to make and go to all appointments, and call your doctor if you are having problems. It's also a good idea to know your test results and keep a list of the medicines you take.  How can you care for yourself at home?  Work with your doctor to come up with a bladder training program that is right for you. You may use one or more of the following methods.  Delayed urination  In the beginning, try to keep from urinating for 5 minutes after you first feel the need to go.  While you wait, take deep, slow breaths " "to relax. Kegel exercises can also help you delay the need to go to the bathroom.  After some practice, when you can easily wait 5 minutes to urinate, try to wait 10 minutes before you urinate.  Slowly increase the waiting period until you are able to control when you have to urinate.  Scheduled urination  Empty your bladder when you first wake up in the morning.  Schedule times throughout the day when you will urinate.  Start by going to the bathroom every hour, even if you don't need to go.  Slowly increase the time between trips to the bathroom.  When you have found a schedule that works well for you, keep doing it.  If you wake up during the night and have to urinate, do it. Apply your schedule to waking hours only.  Kegel exercises  These tighten and strengthen pelvic muscles, which can help you control the flow of urine. (If doing these exercises causes pain, stop doing them and talk with your doctor.) To do Kegel exercises:  Squeeze your muscles as if you were trying not to pass gas. Or squeeze your muscles as if you were stopping the flow of urine. Your belly, legs, and buttocks shouldn't move.  Hold the squeeze for 3 seconds, then relax for 5 to 10 seconds.  Start with 3 seconds, then add 1 second each week until you are able to squeeze for 10 seconds.  Repeat the exercise 10 times a session. Do 3 to 8 sessions a day.  When should you call for help?  Watch closely for changes in your health, and be sure to contact your doctor if:    Your incontinence is getting worse.     You do not get better as expected.   Where can you learn more?  Go to https://www.[employer].net/patiented  Enter V684 in the search box to learn more about \"Bladder Training: Care Instructions.\"  Current as of: November 15, 2023               Content Version: 14.0    9799-9853 [employer].   Care instructions adapted under license by your healthcare professional. If you have questions about a medical condition or this " instruction, always ask your healthcare professional. [employer] disclaims any warranty or liability for your use of this information.

## 2024-09-18 LAB
ALBUMIN SERPL BCG-MCNC: 4.3 G/DL (ref 3.5–5.2)
ALP SERPL-CCNC: 97 U/L (ref 40–150)
ALT SERPL W P-5'-P-CCNC: 12 U/L (ref 0–50)
ANION GAP SERPL CALCULATED.3IONS-SCNC: 10 MMOL/L (ref 7–15)
AST SERPL W P-5'-P-CCNC: 17 U/L (ref 0–45)
BILIRUB DIRECT SERPL-MCNC: <0.2 MG/DL (ref 0–0.3)
BILIRUB SERPL-MCNC: 0.4 MG/DL
BUN SERPL-MCNC: 16.7 MG/DL (ref 8–23)
CALCIUM SERPL-MCNC: 9.3 MG/DL (ref 8.8–10.4)
CHLORIDE SERPL-SCNC: 107 MMOL/L (ref 98–107)
CHOLEST SERPL-MCNC: 178 MG/DL
CREAT SERPL-MCNC: 0.78 MG/DL (ref 0.51–0.95)
EGFRCR SERPLBLD CKD-EPI 2021: 78 ML/MIN/1.73M2
FASTING STATUS PATIENT QL REPORTED: YES
FASTING STATUS PATIENT QL REPORTED: YES
GLUCOSE SERPL-MCNC: 87 MG/DL (ref 70–99)
HCO3 SERPL-SCNC: 25 MMOL/L (ref 22–29)
HDLC SERPL-MCNC: 66 MG/DL
LDLC SERPL CALC-MCNC: 74 MG/DL
NONHDLC SERPL-MCNC: 112 MG/DL
POTASSIUM SERPL-SCNC: 4.1 MMOL/L (ref 3.4–5.3)
PROT SERPL-MCNC: 7.3 G/DL (ref 6.4–8.3)
SODIUM SERPL-SCNC: 142 MMOL/L (ref 135–145)
TRIGL SERPL-MCNC: 188 MG/DL
VIT D+METAB SERPL-MCNC: 19 NG/ML (ref 20–50)

## 2024-09-21 DIAGNOSIS — F32.9 MAJOR DEPRESSIVE DISORDER, REMISSION STATUS UNSPECIFIED, UNSPECIFIED WHETHER RECURRENT: ICD-10-CM

## 2024-09-25 RX ORDER — BUPROPION HYDROCHLORIDE 150 MG/1
150 TABLET ORAL 2 TIMES DAILY
Qty: 180 TABLET | Refills: 1 | Status: SHIPPED | OUTPATIENT
Start: 2024-09-25

## 2024-09-27 DIAGNOSIS — E78.5 HYPERLIPIDEMIA, UNSPECIFIED: ICD-10-CM

## 2024-09-27 RX ORDER — ATORVASTATIN CALCIUM 10 MG/1
TABLET, FILM COATED ORAL
Qty: 90 TABLET | Refills: 1 | OUTPATIENT
Start: 2024-09-27

## 2024-10-02 ENCOUNTER — HOSPITAL ENCOUNTER (OUTPATIENT)
Dept: BONE DENSITY | Facility: HOSPITAL | Age: 74
Discharge: HOME | End: 2024-10-02
Attending: FAMILY MEDICINE
Payer: COMMERCIAL

## 2024-10-02 ENCOUNTER — ANCILLARY PROCEDURE (OUTPATIENT)
Dept: MAMMOGRAPHY | Facility: HOSPITAL | Age: 74
End: 2024-10-02
Attending: FAMILY MEDICINE
Payer: COMMERCIAL

## 2024-10-02 DIAGNOSIS — Z78.0 POST-MENOPAUSAL: ICD-10-CM

## 2024-10-02 DIAGNOSIS — M89.9 DISORDER OF BONE AND CARTILAGE: ICD-10-CM

## 2024-10-02 DIAGNOSIS — M94.9 DISORDER OF BONE AND CARTILAGE: ICD-10-CM

## 2024-10-02 DIAGNOSIS — Z12.31 ENCOUNTER FOR SCREENING MAMMOGRAM FOR BREAST CANCER: ICD-10-CM

## 2024-10-02 PROCEDURE — 77080 DXA BONE DENSITY AXIAL: CPT

## 2024-10-02 PROCEDURE — 77063 BREAST TOMOSYNTHESIS BI: CPT

## 2024-10-14 DIAGNOSIS — E78.5 HYPERLIPIDEMIA, UNSPECIFIED: ICD-10-CM

## 2024-10-14 RX ORDER — ATORVASTATIN CALCIUM 10 MG/1
TABLET, FILM COATED ORAL
Qty: 90 TABLET | Refills: 1 | OUTPATIENT
Start: 2024-10-14

## 2024-11-08 DIAGNOSIS — E78.5 HYPERLIPIDEMIA, UNSPECIFIED: ICD-10-CM

## 2024-11-08 RX ORDER — ATORVASTATIN CALCIUM 10 MG/1
TABLET, FILM COATED ORAL
Qty: 90 TABLET | Refills: 1 | OUTPATIENT
Start: 2024-11-08

## 2024-11-25 DIAGNOSIS — E78.5 HYPERLIPIDEMIA, UNSPECIFIED: ICD-10-CM

## 2024-11-25 RX ORDER — ATORVASTATIN CALCIUM 10 MG/1
TABLET, FILM COATED ORAL
Qty: 90 TABLET | Refills: 2 | Status: SHIPPED | OUTPATIENT
Start: 2024-11-25

## 2024-11-26 DIAGNOSIS — K21.9 GASTRO-ESOPHAGEAL REFLUX DISEASE WITHOUT ESOPHAGITIS: ICD-10-CM

## 2024-11-26 RX ORDER — LANSOPRAZOLE 30 MG/1
CAPSULE, DELAYED RELEASE ORAL
Qty: 90 CAPSULE | Refills: 2 | Status: SHIPPED | OUTPATIENT
Start: 2024-11-26

## 2024-12-09 ENCOUNTER — OFFICE VISIT (OUTPATIENT)
Dept: URGENT CARE | Facility: URGENT CARE | Age: 74
End: 2024-12-09
Payer: COMMERCIAL

## 2024-12-09 VITALS
RESPIRATION RATE: 21 BRPM | HEART RATE: 87 BPM | TEMPERATURE: 97.8 F | OXYGEN SATURATION: 100 % | DIASTOLIC BLOOD PRESSURE: 84 MMHG | SYSTOLIC BLOOD PRESSURE: 127 MMHG

## 2024-12-09 DIAGNOSIS — J34.89 SINUS DRAINAGE: Primary | ICD-10-CM

## 2024-12-09 DIAGNOSIS — H57.9 PRURITUS OF BOTH EYES: ICD-10-CM

## 2024-12-09 DIAGNOSIS — J01.10 ACUTE NON-RECURRENT FRONTAL SINUSITIS: ICD-10-CM

## 2024-12-09 PROCEDURE — 99213 OFFICE O/P EST LOW 20 MIN: CPT | Performed by: PHYSICIAN ASSISTANT

## 2024-12-09 RX ORDER — FLUTICASONE PROPIONATE 50 MCG
1 SPRAY, SUSPENSION (ML) NASAL DAILY
Qty: 16 G | Refills: 11 | Status: SHIPPED | OUTPATIENT
Start: 2024-12-09

## 2024-12-09 RX ORDER — OLOPATADINE HYDROCHLORIDE 1 MG/ML
1 SOLUTION/ DROPS OPHTHALMIC 2 TIMES DAILY
Qty: 10 ML | Refills: 1 | Status: SHIPPED | OUTPATIENT
Start: 2024-12-09

## 2024-12-09 RX ORDER — CETIRIZINE HYDROCHLORIDE 10 MG/1
10 TABLET ORAL DAILY
Qty: 30 TABLET | Refills: 11 | Status: SHIPPED | OUTPATIENT
Start: 2024-12-09

## 2024-12-09 NOTE — PROGRESS NOTES
Assessment & Plan     Sinus drainage  Discussed with pt sx may be allergy related, will give trial of zyrtec, flonase, and patanol for her eyes.  She has had improvement when using benadryl and moderate clear drainage, pressure but not pain, no fevers.  No known allergies but given sneezing and sx profile suggestive of allergy cause. If not improving with above may fill RX for abx.      - olopatadine (PATANOL) 0.1 % ophthalmic solution  Dispense: 10 mL; Refill: 1  - cetirizine (ZYRTEC) 10 MG tablet  Dispense: 30 tablet; Refill: 11  - fluticasone (FLONASE) 50 MCG/ACT nasal spray  Dispense: 16 g; Refill: 11    Pruritus of both eyes:   Patanol trial.      - olopatadine (PATANOL) 0.1 % ophthalmic solution  Dispense: 10 mL; Refill: 1  - cetirizine (ZYRTEC) 10 MG tablet  Dispense: 30 tablet; Refill: 11  - fluticasone (FLONASE) 50 MCG/ACT nasal spray  Dispense: 16 g; Refill: 11    Acute non-recurrent frontal sinusitis  Will start augmentin if not improving with above, nasal saline, humidification.  Push fluids, rest and ibuprofen or tylenol for comfort.    At the end of the encounter, I discussed results, diagnosis, medications. Discussed red flags for immediate return to clinic/ER, as well as indications for follow up if no improvement. Patient understood and agreed to plan. Patient was stable for discharge      - amoxicillin-clavulanate (AUGMENTIN) 875-125 MG tablet  Dispense: 14 tablet; Refill: 0             TANNA Nieves [facility] [place]    Kodi Escobar is a 74 year old female who presents to clinic today for the following health issues:  Chief Complaint   Patient presents with    Pharyngitis     X 12 days, sore throat is going away, congestion still there. Itchy eyes and discharge. sudafed and benadryl, no improvement. Interested in Z pack. Has phlegm.        HPI  Pt is a 74 year old female who presents to urgent care with 12 days of rhinorrhea, itchy watery eyes, congestion,  PND.    Lots of mucus from the nose, thick clear to discolored discharge.    Sneezing moderately.   ST improved now but ongoing drainage.    Dripy and itchy eyes, no pain but some pressure.    No nausea, vomiting.    Seudafed and benadryl for sx.    No documented hx of allergies  No fevers, sob, difficulty breathing.      Review of Systems  Constitutional, HEENT, cardiovascular, pulmonary, gi and gu systems are negative, except as otherwise noted.    Patient Active Problem List   Diagnosis    Esophageal Reflux    Hyperlipidemia    Osteopenia    Vitamin D deficiency    Cigarette nicotine dependence in remission    Anxiety    Moderate major depression (H)    Cigarette nicotine dependence without complication     Current Outpatient Medications   Medication Sig Dispense Refill    amoxicillin-clavulanate (AUGMENTIN) 875-125 MG tablet Take 1 tablet by mouth 2 times daily for 7 days. 14 tablet 0    atorvastatin (LIPITOR) 10 MG tablet TAKE 1 TABLET BY MOUTH EVERY DAY 90 tablet 2    buPROPion (WELLBUTRIN XL) 150 MG 24 hr tablet TAKE 1 TABLET BY MOUTH 2 TIMES DAILY 180 tablet 1    cetirizine (ZYRTEC) 10 MG tablet Take 1 tablet (10 mg) by mouth daily. 30 tablet 11    fluticasone (FLONASE) 50 MCG/ACT nasal spray Spray 1 spray into both nostrils daily. 16 g 11    LANsoprazole (PREVACID) 30 MG DR capsule TAKE 1 CAPSULE BY MOUTH EVERY DAY 90 capsule 2    LORazepam (ATIVAN) 1 MG tablet TAKE 1 TABLET BY MOUTH DAILY AS NEEDED FOR ANXIETY 30 tablet 0    olopatadine (PATANOL) 0.1 % ophthalmic solution Place 1 drop into both eyes 2 times daily. 10 mL 1    valACYclovir (VALTREX) 1000 mg tablet Take 1 tablet (1,000 mg) by mouth 3 times daily for 7 days 21 tablet 0     No current facility-administered medications for this visit.         Objective    /84 (BP Location: Right arm, Patient Position: Sitting, Cuff Size: Adult Regular)   Pulse 87   Temp 97.8  F (36.6  C) (Oral)   Resp 21   LMP  (LMP Unknown)   SpO2 100%   Physical  Exam   Pt is in no acute distress and appears well  Ears patent B:  TM s intact, non-injected. All land marks easily visibile    Nasal mucosa is edematous, clear discharge.  No TTP frontal sinuses, some pressure with palpation of the maxillary sinuses.   Pharynx: non erythematous, tonsils non hypertrophied, No exudate   Neck supple: no adenopathy  Lungs: CTA  Heart: RRR, no murmur, no thrills or heaves   Ext: no edema  Skin: no rashes

## 2024-12-09 NOTE — PATIENT INSTRUCTIONS
Try the allergy medication: patanol, zyrtec and flonase for drippy nose, sneezing, watery eyes.  These can be taken regularly.      Start the augmentin for sinus infection.

## 2025-01-09 ENCOUNTER — TELEPHONE (OUTPATIENT)
Dept: FAMILY MEDICINE | Facility: CLINIC | Age: 75
End: 2025-01-09
Payer: COMMERCIAL

## 2025-01-09 NOTE — TELEPHONE ENCOUNTER
Incoming call from patient in regards to DEXA results and recommendations from PCP.    Patient would like to start the medication for osteoporosis (fosamax).  She has also started increasing intake of vitamin D and calcium and getting consistent exercise.    Pharmacy pended once sent no need to route back as patient will hear from pharmacy and is expecting medicine.    Brendon Garcia RN     [facility]

## 2025-08-18 ENCOUNTER — PATIENT OUTREACH (OUTPATIENT)
Dept: CARE COORDINATION | Facility: CLINIC | Age: 75
End: 2025-08-18
Payer: COMMERCIAL

## 2025-08-21 DIAGNOSIS — K21.9 GASTRO-ESOPHAGEAL REFLUX DISEASE WITHOUT ESOPHAGITIS: ICD-10-CM

## 2025-08-21 RX ORDER — LANSOPRAZOLE 30 MG/1
30 CAPSULE, DELAYED RELEASE ORAL DAILY
Qty: 90 CAPSULE | Refills: 0 | Status: SHIPPED | OUTPATIENT
Start: 2025-08-21